# Patient Record
Sex: MALE | Race: WHITE | Employment: OTHER | URBAN - METROPOLITAN AREA
[De-identification: names, ages, dates, MRNs, and addresses within clinical notes are randomized per-mention and may not be internally consistent; named-entity substitution may affect disease eponyms.]

---

## 2021-05-20 RX ORDER — OXYCODONE AND ACETAMINOPHEN 10; 325 MG/1; MG/1
2 TABLET ORAL 2 TIMES DAILY PRN
Status: ON HOLD | COMMUNITY
End: 2021-05-25 | Stop reason: HOSPADM

## 2021-05-20 RX ORDER — TRAZODONE HYDROCHLORIDE 100 MG/1
100 TABLET ORAL NIGHTLY
COMMUNITY

## 2021-05-20 RX ORDER — CYCLOBENZAPRINE HCL 10 MG
10 TABLET ORAL DAILY
COMMUNITY
End: 2021-09-14

## 2021-05-20 RX ORDER — GABAPENTIN 600 MG/1
1800 TABLET, FILM COATED ORAL NIGHTLY
Status: ON HOLD | COMMUNITY
End: 2021-05-25 | Stop reason: SDUPTHER

## 2021-05-24 ENCOUNTER — HOSPITAL ENCOUNTER (INPATIENT)
Age: 61
LOS: 1 days | Discharge: HOME | DRG: 472 | End: 2021-05-25
Attending: ORTHOPAEDIC SURGERY | Admitting: ORTHOPAEDIC SURGERY
Payer: COMMERCIAL

## 2021-05-24 ENCOUNTER — ANESTHESIA (OUTPATIENT)
Dept: OPERATING ROOM | Age: 61
DRG: 472 | End: 2021-05-24
Payer: COMMERCIAL

## 2021-05-24 ENCOUNTER — APPOINTMENT (OUTPATIENT)
Dept: GENERAL RADIOLOGY | Age: 61
DRG: 472 | End: 2021-05-24
Attending: ORTHOPAEDIC SURGERY
Payer: COMMERCIAL

## 2021-05-24 ENCOUNTER — ANESTHESIA EVENT (OUTPATIENT)
Dept: OPERATING ROOM | Age: 61
DRG: 472 | End: 2021-05-24
Payer: COMMERCIAL

## 2021-05-24 VITALS — OXYGEN SATURATION: 98 % | TEMPERATURE: 97.5 F | DIASTOLIC BLOOD PRESSURE: 55 MMHG | SYSTOLIC BLOOD PRESSURE: 107 MMHG

## 2021-05-24 DIAGNOSIS — G89.29 CHRONIC NECK AND BACK PAIN: ICD-10-CM

## 2021-05-24 DIAGNOSIS — M54.2 CHRONIC NECK AND BACK PAIN: ICD-10-CM

## 2021-05-24 DIAGNOSIS — M47.12 CERVICAL SPONDYLOSIS WITH MYELOPATHY: Primary | ICD-10-CM

## 2021-05-24 DIAGNOSIS — M54.9 CHRONIC NECK AND BACK PAIN: ICD-10-CM

## 2021-05-24 PROCEDURE — 2720000010 HC SURG SUPPLY STERILE: Performed by: ORTHOPAEDIC SURGERY

## 2021-05-24 PROCEDURE — 1200000000 HC SEMI PRIVATE

## 2021-05-24 PROCEDURE — 0PP304Z REMOVAL OF INTERNAL FIXATION DEVICE FROM CERVICAL VERTEBRA, OPEN APPROACH: ICD-10-PCS | Performed by: ORTHOPAEDIC SURGERY

## 2021-05-24 PROCEDURE — 01N10ZZ RELEASE CERVICAL NERVE, OPEN APPROACH: ICD-10-PCS | Performed by: ORTHOPAEDIC SURGERY

## 2021-05-24 PROCEDURE — 0RB30ZZ EXCISION OF CERVICAL VERTEBRAL DISC, OPEN APPROACH: ICD-10-PCS | Performed by: ORTHOPAEDIC SURGERY

## 2021-05-24 PROCEDURE — 6360000002 HC RX W HCPCS: Performed by: NURSE ANESTHETIST, CERTIFIED REGISTERED

## 2021-05-24 PROCEDURE — 0RG20A0 FUSION OF 2 OR MORE CERVICAL VERTEBRAL JOINTS WITH INTERBODY FUSION DEVICE, ANTERIOR APPROACH, ANTERIOR COLUMN, OPEN APPROACH: ICD-10-PCS | Performed by: ORTHOPAEDIC SURGERY

## 2021-05-24 PROCEDURE — 3600000004 HC SURGERY LEVEL 4 BASE: Performed by: ORTHOPAEDIC SURGERY

## 2021-05-24 PROCEDURE — C1713 ANCHOR/SCREW BN/BN,TIS/BN: HCPCS | Performed by: ORTHOPAEDIC SURGERY

## 2021-05-24 PROCEDURE — 6360000002 HC RX W HCPCS: Performed by: ORTHOPAEDIC SURGERY

## 2021-05-24 PROCEDURE — 2580000003 HC RX 258: Performed by: ANESTHESIOLOGY

## 2021-05-24 PROCEDURE — 3700000001 HC ADD 15 MINUTES (ANESTHESIA): Performed by: ORTHOPAEDIC SURGERY

## 2021-05-24 PROCEDURE — 3209999900 FLUORO FOR SURGICAL PROCEDURES

## 2021-05-24 PROCEDURE — 6360000002 HC RX W HCPCS: Performed by: ANESTHESIOLOGY

## 2021-05-24 PROCEDURE — 3700000000 HC ANESTHESIA ATTENDED CARE: Performed by: ORTHOPAEDIC SURGERY

## 2021-05-24 PROCEDURE — 7100000000 HC PACU RECOVERY - FIRST 15 MIN: Performed by: ORTHOPAEDIC SURGERY

## 2021-05-24 PROCEDURE — 2780000010 HC IMPLANT OTHER: Performed by: ORTHOPAEDIC SURGERY

## 2021-05-24 PROCEDURE — 2580000003 HC RX 258: Performed by: NURSE ANESTHETIST, CERTIFIED REGISTERED

## 2021-05-24 PROCEDURE — 2500000003 HC RX 250 WO HCPCS: Performed by: NURSE ANESTHETIST, CERTIFIED REGISTERED

## 2021-05-24 PROCEDURE — 2580000003 HC RX 258: Performed by: ORTHOPAEDIC SURGERY

## 2021-05-24 PROCEDURE — 7100000001 HC PACU RECOVERY - ADDTL 15 MIN: Performed by: ORTHOPAEDIC SURGERY

## 2021-05-24 PROCEDURE — 2709999900 HC NON-CHARGEABLE SUPPLY: Performed by: ORTHOPAEDIC SURGERY

## 2021-05-24 PROCEDURE — 6370000000 HC RX 637 (ALT 250 FOR IP): Performed by: ORTHOPAEDIC SURGERY

## 2021-05-24 PROCEDURE — 2500000003 HC RX 250 WO HCPCS: Performed by: ORTHOPAEDIC SURGERY

## 2021-05-24 PROCEDURE — 3600000014 HC SURGERY LEVEL 4 ADDTL 15MIN: Performed by: ORTHOPAEDIC SURGERY

## 2021-05-24 DEVICE — SCREW 7713515 ZEVO VAR SD 3.5MM X 15MM
Type: IMPLANTABLE DEVICE | Site: SPINE CERVICAL | Status: FUNCTIONAL
Brand: ZEVO™ ANTERIOR CERVICAL PLATE SYSTEM

## 2021-05-24 DEVICE — IMPLANT 4240864 ANATOMIC C 16X14X 8MM
Type: IMPLANTABLE DEVICE | Site: SPINE CERVICAL | Status: FUNCTIONAL
Brand: ANATOMIC PEEK CERVICAL FUSION SYSTEM

## 2021-05-24 DEVICE — GRAFT BNE SUB 1CC CELLULAR MTRX OSTEOCEL +: Type: IMPLANTABLE DEVICE | Site: SPINE CERVICAL | Status: FUNCTIONAL

## 2021-05-24 RX ORDER — HYDRALAZINE HYDROCHLORIDE 20 MG/ML
5 INJECTION INTRAMUSCULAR; INTRAVENOUS EVERY 10 MIN PRN
Status: DISCONTINUED | OUTPATIENT
Start: 2021-05-24 | End: 2021-05-24 | Stop reason: HOSPADM

## 2021-05-24 RX ORDER — DEXAMETHASONE SODIUM PHOSPHATE 10 MG/ML
INJECTION, SOLUTION INTRAMUSCULAR; INTRAVENOUS PRN
Status: DISCONTINUED | OUTPATIENT
Start: 2021-05-24 | End: 2021-05-24 | Stop reason: SDUPTHER

## 2021-05-24 RX ORDER — SODIUM CHLORIDE 0.9 % (FLUSH) 0.9 %
10 SYRINGE (ML) INJECTION PRN
Status: DISCONTINUED | OUTPATIENT
Start: 2021-05-24 | End: 2021-05-25 | Stop reason: HOSPADM

## 2021-05-24 RX ORDER — KETAMINE HCL IN NACL, ISO-OSM 100MG/10ML
SYRINGE (ML) INJECTION PRN
Status: DISCONTINUED | OUTPATIENT
Start: 2021-05-24 | End: 2021-05-24 | Stop reason: SDUPTHER

## 2021-05-24 RX ORDER — CYCLOBENZAPRINE HCL 10 MG
10 TABLET ORAL 3 TIMES DAILY PRN
Status: DISCONTINUED | OUTPATIENT
Start: 2021-05-24 | End: 2021-05-25 | Stop reason: HOSPADM

## 2021-05-24 RX ORDER — ONDANSETRON 2 MG/ML
INJECTION INTRAMUSCULAR; INTRAVENOUS PRN
Status: DISCONTINUED | OUTPATIENT
Start: 2021-05-24 | End: 2021-05-24 | Stop reason: SDUPTHER

## 2021-05-24 RX ORDER — FENTANYL CITRATE 50 UG/ML
25 INJECTION, SOLUTION INTRAMUSCULAR; INTRAVENOUS EVERY 5 MIN PRN
Status: DISCONTINUED | OUTPATIENT
Start: 2021-05-24 | End: 2021-05-24 | Stop reason: HOSPADM

## 2021-05-24 RX ORDER — LABETALOL HYDROCHLORIDE 5 MG/ML
5 INJECTION, SOLUTION INTRAVENOUS EVERY 10 MIN PRN
Status: DISCONTINUED | OUTPATIENT
Start: 2021-05-24 | End: 2021-05-24 | Stop reason: HOSPADM

## 2021-05-24 RX ORDER — POLYETHYLENE GLYCOL 3350 17 G/17G
17 POWDER, FOR SOLUTION ORAL DAILY
Status: DISCONTINUED | OUTPATIENT
Start: 2021-05-24 | End: 2021-05-25 | Stop reason: HOSPADM

## 2021-05-24 RX ORDER — MORPHINE SULFATE 15 MG/1
15 TABLET, FILM COATED, EXTENDED RELEASE ORAL EVERY 12 HOURS SCHEDULED
Status: DISCONTINUED | OUTPATIENT
Start: 2021-05-24 | End: 2021-05-25 | Stop reason: HOSPADM

## 2021-05-24 RX ORDER — LIDOCAINE HYDROCHLORIDE 20 MG/ML
JELLY TOPICAL
Status: DISCONTINUED
Start: 2021-05-24 | End: 2021-05-24

## 2021-05-24 RX ORDER — SODIUM CHLORIDE 9 MG/ML
25 INJECTION, SOLUTION INTRAVENOUS PRN
Status: DISCONTINUED | OUTPATIENT
Start: 2021-05-24 | End: 2021-05-25 | Stop reason: HOSPADM

## 2021-05-24 RX ORDER — HYDROMORPHONE HYDROCHLORIDE 1 MG/ML
0.5 INJECTION, SOLUTION INTRAMUSCULAR; INTRAVENOUS; SUBCUTANEOUS EVERY 5 MIN PRN
Status: DISCONTINUED | OUTPATIENT
Start: 2021-05-24 | End: 2021-05-24 | Stop reason: HOSPADM

## 2021-05-24 RX ORDER — LIDOCAINE HYDROCHLORIDE 20 MG/ML
INJECTION, SOLUTION EPIDURAL; INFILTRATION; INTRACAUDAL; PERINEURAL PRN
Status: DISCONTINUED | OUTPATIENT
Start: 2021-05-24 | End: 2021-05-24 | Stop reason: SDUPTHER

## 2021-05-24 RX ORDER — ONDANSETRON 2 MG/ML
4 INJECTION INTRAMUSCULAR; INTRAVENOUS
Status: DISCONTINUED | OUTPATIENT
Start: 2021-05-24 | End: 2021-05-24 | Stop reason: HOSPADM

## 2021-05-24 RX ORDER — SENNA AND DOCUSATE SODIUM 50; 8.6 MG/1; MG/1
1 TABLET, FILM COATED ORAL 2 TIMES DAILY
Status: DISCONTINUED | OUTPATIENT
Start: 2021-05-24 | End: 2021-05-25 | Stop reason: HOSPADM

## 2021-05-24 RX ORDER — OXYCODONE AND ACETAMINOPHEN 10; 325 MG/1; MG/1
1 TABLET ORAL EVERY 4 HOURS PRN
Status: DISCONTINUED | OUTPATIENT
Start: 2021-05-24 | End: 2021-05-25 | Stop reason: HOSPADM

## 2021-05-24 RX ORDER — PROPOFOL 10 MG/ML
INJECTION, EMULSION INTRAVENOUS PRN
Status: DISCONTINUED | OUTPATIENT
Start: 2021-05-24 | End: 2021-05-24 | Stop reason: SDUPTHER

## 2021-05-24 RX ORDER — SODIUM CHLORIDE, SODIUM LACTATE, POTASSIUM CHLORIDE, CALCIUM CHLORIDE 600; 310; 30; 20 MG/100ML; MG/100ML; MG/100ML; MG/100ML
INJECTION, SOLUTION INTRAVENOUS CONTINUOUS
Status: DISCONTINUED | OUTPATIENT
Start: 2021-05-24 | End: 2021-05-24

## 2021-05-24 RX ORDER — DEXAMETHASONE SODIUM PHOSPHATE 10 MG/ML
6 INJECTION, SOLUTION INTRAMUSCULAR; INTRAVENOUS EVERY 8 HOURS
Status: COMPLETED | OUTPATIENT
Start: 2021-05-24 | End: 2021-05-25

## 2021-05-24 RX ORDER — PROMETHAZINE HYDROCHLORIDE 25 MG/ML
6.25 INJECTION, SOLUTION INTRAMUSCULAR; INTRAVENOUS
Status: DISCONTINUED | OUTPATIENT
Start: 2021-05-24 | End: 2021-05-24 | Stop reason: HOSPADM

## 2021-05-24 RX ORDER — PROPOFOL 10 MG/ML
INJECTION, EMULSION INTRAVENOUS CONTINUOUS PRN
Status: DISCONTINUED | OUTPATIENT
Start: 2021-05-24 | End: 2021-05-24 | Stop reason: SDUPTHER

## 2021-05-24 RX ORDER — OXYCODONE AND ACETAMINOPHEN 10; 325 MG/1; MG/1
2 TABLET ORAL EVERY 4 HOURS PRN
Status: DISCONTINUED | OUTPATIENT
Start: 2021-05-24 | End: 2021-05-25 | Stop reason: HOSPADM

## 2021-05-24 RX ORDER — TRAZODONE HYDROCHLORIDE 100 MG/1
100 TABLET ORAL NIGHTLY
Status: DISCONTINUED | OUTPATIENT
Start: 2021-05-24 | End: 2021-05-25 | Stop reason: HOSPADM

## 2021-05-24 RX ORDER — SUCCINYLCHOLINE/SOD CL,ISO/PF 100 MG/5ML
SYRINGE (ML) INTRAVENOUS PRN
Status: DISCONTINUED | OUTPATIENT
Start: 2021-05-24 | End: 2021-05-24 | Stop reason: SDUPTHER

## 2021-05-24 RX ORDER — PHENYLEPHRINE HCL IN 0.9% NACL 1 MG/10 ML
SYRINGE (ML) INTRAVENOUS PRN
Status: DISCONTINUED | OUTPATIENT
Start: 2021-05-24 | End: 2021-05-24 | Stop reason: SDUPTHER

## 2021-05-24 RX ORDER — PROPOFOL 10 MG/ML
INJECTION, EMULSION INTRAVENOUS
Status: COMPLETED
Start: 2021-05-24 | End: 2021-05-24

## 2021-05-24 RX ORDER — MIDAZOLAM HYDROCHLORIDE 1 MG/ML
INJECTION INTRAMUSCULAR; INTRAVENOUS PRN
Status: DISCONTINUED | OUTPATIENT
Start: 2021-05-24 | End: 2021-05-24 | Stop reason: SDUPTHER

## 2021-05-24 RX ORDER — ONDANSETRON 2 MG/ML
4 INJECTION INTRAMUSCULAR; INTRAVENOUS EVERY 6 HOURS PRN
Status: DISCONTINUED | OUTPATIENT
Start: 2021-05-24 | End: 2021-05-25 | Stop reason: HOSPADM

## 2021-05-24 RX ORDER — OXYCODONE HYDROCHLORIDE AND ACETAMINOPHEN 5; 325 MG/1; MG/1
1 TABLET ORAL PRN
Status: DISCONTINUED | OUTPATIENT
Start: 2021-05-24 | End: 2021-05-24 | Stop reason: HOSPADM

## 2021-05-24 RX ORDER — FENTANYL CITRATE 50 UG/ML
INJECTION, SOLUTION INTRAMUSCULAR; INTRAVENOUS PRN
Status: DISCONTINUED | OUTPATIENT
Start: 2021-05-24 | End: 2021-05-24 | Stop reason: SDUPTHER

## 2021-05-24 RX ORDER — PROMETHAZINE HYDROCHLORIDE 12.5 MG/1
12.5 TABLET ORAL EVERY 6 HOURS PRN
Status: DISCONTINUED | OUTPATIENT
Start: 2021-05-24 | End: 2021-05-25 | Stop reason: HOSPADM

## 2021-05-24 RX ORDER — SODIUM CHLORIDE 0.9 % (FLUSH) 0.9 %
10 SYRINGE (ML) INJECTION EVERY 12 HOURS SCHEDULED
Status: DISCONTINUED | OUTPATIENT
Start: 2021-05-24 | End: 2021-05-25 | Stop reason: HOSPADM

## 2021-05-24 RX ORDER — OXYCODONE HYDROCHLORIDE AND ACETAMINOPHEN 5; 325 MG/1; MG/1
2 TABLET ORAL PRN
Status: DISCONTINUED | OUTPATIENT
Start: 2021-05-24 | End: 2021-05-24 | Stop reason: HOSPADM

## 2021-05-24 RX ORDER — SODIUM CHLORIDE 9 MG/ML
INJECTION, SOLUTION INTRAVENOUS CONTINUOUS
Status: DISCONTINUED | OUTPATIENT
Start: 2021-05-24 | End: 2021-05-25 | Stop reason: HOSPADM

## 2021-05-24 RX ADMIN — ONDANSETRON 4 MG: 2 INJECTION, SOLUTION INTRAMUSCULAR; INTRAVENOUS at 15:46

## 2021-05-24 RX ADMIN — TRAZODONE HYDROCHLORIDE 100 MG: 100 TABLET ORAL at 21:42

## 2021-05-24 RX ADMIN — DOCUSATE SODIUM 50MG AND SENNOSIDES 8.6MG 1 TABLET: 8.6; 5 TABLET, FILM COATED ORAL at 21:42

## 2021-05-24 RX ADMIN — FENTANYL CITRATE 50 MCG: 50 INJECTION, SOLUTION INTRAMUSCULAR; INTRAVENOUS at 13:27

## 2021-05-24 RX ADMIN — PROPOFOL 150 MCG/KG/MIN: 10 INJECTION, EMULSION INTRAVENOUS at 14:07

## 2021-05-24 RX ADMIN — OXYCODONE HYDROCHLORIDE AND ACETAMINOPHEN 2 TABLET: 10; 325 TABLET ORAL at 22:56

## 2021-05-24 RX ADMIN — MORPHINE SULFATE 15 MG: 15 TABLET, FILM COATED, EXTENDED RELEASE ORAL at 21:42

## 2021-05-24 RX ADMIN — HYDROMORPHONE HYDROCHLORIDE 0.5 MG: 1 INJECTION, SOLUTION INTRAMUSCULAR; INTRAVENOUS; SUBCUTANEOUS at 17:28

## 2021-05-24 RX ADMIN — PROPOFOL 120 MCG/KG/MIN: 10 INJECTION, EMULSION INTRAVENOUS at 13:45

## 2021-05-24 RX ADMIN — Medication 200 MCG: at 13:48

## 2021-05-24 RX ADMIN — CEFAZOLIN 2000 MG: 10 INJECTION, POWDER, FOR SOLUTION INTRAVENOUS at 13:35

## 2021-05-24 RX ADMIN — PHENYLEPHRINE HYDROCHLORIDE 50 MCG/MIN: 10 INJECTION, SOLUTION INTRAMUSCULAR; INTRAVENOUS; SUBCUTANEOUS at 14:22

## 2021-05-24 RX ADMIN — SODIUM CHLORIDE, POTASSIUM CHLORIDE, SODIUM LACTATE AND CALCIUM CHLORIDE: 600; 310; 30; 20 INJECTION, SOLUTION INTRAVENOUS at 11:57

## 2021-05-24 RX ADMIN — LIDOCAINE HYDROCHLORIDE 50 MG: 20 INJECTION, SOLUTION EPIDURAL; INFILTRATION; INTRACAUDAL; PERINEURAL at 13:28

## 2021-05-24 RX ADMIN — PROPOFOL 30 MG: 10 INJECTION, EMULSION INTRAVENOUS at 13:45

## 2021-05-24 RX ADMIN — Medication 200 MCG: at 14:06

## 2021-05-24 RX ADMIN — Medication 10 MG: at 15:15

## 2021-05-24 RX ADMIN — DEXAMETHASONE SODIUM PHOSPHATE 10 MG: 10 INJECTION INTRAMUSCULAR; INTRAVENOUS at 13:54

## 2021-05-24 RX ADMIN — HYDROMORPHONE HYDROCHLORIDE 0.5 MG: 1 INJECTION, SOLUTION INTRAMUSCULAR; INTRAVENOUS; SUBCUTANEOUS at 17:13

## 2021-05-24 RX ADMIN — FENTANYL CITRATE 25 MCG: 50 INJECTION, SOLUTION INTRAMUSCULAR; INTRAVENOUS at 16:00

## 2021-05-24 RX ADMIN — SODIUM CHLORIDE, POTASSIUM CHLORIDE, SODIUM LACTATE AND CALCIUM CHLORIDE: 600; 310; 30; 20 INJECTION, SOLUTION INTRAVENOUS at 16:26

## 2021-05-24 RX ADMIN — MIDAZOLAM 2 MG: 1 INJECTION INTRAMUSCULAR; INTRAVENOUS at 13:20

## 2021-05-24 RX ADMIN — OXYCODONE HYDROCHLORIDE AND ACETAMINOPHEN 2 TABLET: 10; 325 TABLET ORAL at 18:29

## 2021-05-24 RX ADMIN — PROPOFOL 110 MG: 10 INJECTION, EMULSION INTRAVENOUS at 13:28

## 2021-05-24 RX ADMIN — Medication 100 MG: at 13:28

## 2021-05-24 RX ADMIN — FENTANYL CITRATE 50 MCG: 50 INJECTION, SOLUTION INTRAMUSCULAR; INTRAVENOUS at 14:03

## 2021-05-24 RX ADMIN — HYDROMORPHONE HYDROCHLORIDE 0.5 MG: 1 INJECTION, SOLUTION INTRAMUSCULAR; INTRAVENOUS; SUBCUTANEOUS at 20:36

## 2021-05-24 RX ADMIN — CEFAZOLIN SODIUM 2000 MG: 10 INJECTION, POWDER, FOR SOLUTION INTRAVENOUS at 18:44

## 2021-05-24 RX ADMIN — Medication 25 MG: at 13:57

## 2021-05-24 RX ADMIN — FENTANYL CITRATE 25 MCG: 50 INJECTION, SOLUTION INTRAMUSCULAR; INTRAVENOUS at 15:10

## 2021-05-24 RX ADMIN — LIDOCAINE HYDROCHLORIDE 50 MG: 20 INJECTION, SOLUTION EPIDURAL; INFILTRATION; INTRACAUDAL; PERINEURAL at 16:00

## 2021-05-24 RX ADMIN — POLYETHYLENE GLYCOL 3350 17 G: 17 POWDER, FOR SOLUTION ORAL at 21:43

## 2021-05-24 RX ADMIN — SODIUM CHLORIDE: 9 INJECTION, SOLUTION INTRAVENOUS at 18:44

## 2021-05-24 RX ADMIN — DEXAMETHASONE SODIUM PHOSPHATE 6 MG: 10 INJECTION, SOLUTION INTRAMUSCULAR; INTRAVENOUS at 18:44

## 2021-05-24 ASSESSMENT — PULMONARY FUNCTION TESTS
PIF_VALUE: 14
PIF_VALUE: 16
PIF_VALUE: 15
PIF_VALUE: 15
PIF_VALUE: 1
PIF_VALUE: 14
PIF_VALUE: 15
PIF_VALUE: 16
PIF_VALUE: 15
PIF_VALUE: 16
PIF_VALUE: 16
PIF_VALUE: 15
PIF_VALUE: 16
PIF_VALUE: 15
PIF_VALUE: 15
PIF_VALUE: 31
PIF_VALUE: 17
PIF_VALUE: 15
PIF_VALUE: 16
PIF_VALUE: 16
PIF_VALUE: 15
PIF_VALUE: 14
PIF_VALUE: 15
PIF_VALUE: 16
PIF_VALUE: 14
PIF_VALUE: 16
PIF_VALUE: 14
PIF_VALUE: 16
PIF_VALUE: 3
PIF_VALUE: 15
PIF_VALUE: 1
PIF_VALUE: 15
PIF_VALUE: 15
PIF_VALUE: 16
PIF_VALUE: 16
PIF_VALUE: 14
PIF_VALUE: 14
PIF_VALUE: 15
PIF_VALUE: 15
PIF_VALUE: 14
PIF_VALUE: 15
PIF_VALUE: 31
PIF_VALUE: 14
PIF_VALUE: 16
PIF_VALUE: 16
PIF_VALUE: 15
PIF_VALUE: 16
PIF_VALUE: 18
PIF_VALUE: 15
PIF_VALUE: 16
PIF_VALUE: 0
PIF_VALUE: 12
PIF_VALUE: 1
PIF_VALUE: 16
PIF_VALUE: 15
PIF_VALUE: 14
PIF_VALUE: 15
PIF_VALUE: 1
PIF_VALUE: 15
PIF_VALUE: 15
PIF_VALUE: 17
PIF_VALUE: 15
PIF_VALUE: 14
PIF_VALUE: 15
PIF_VALUE: 15
PIF_VALUE: 16
PIF_VALUE: 14
PIF_VALUE: 14
PIF_VALUE: 15
PIF_VALUE: 16
PIF_VALUE: 14
PIF_VALUE: 15
PIF_VALUE: 16
PIF_VALUE: 15
PIF_VALUE: 16
PIF_VALUE: 14
PIF_VALUE: 15
PIF_VALUE: 2
PIF_VALUE: 14
PIF_VALUE: 16
PIF_VALUE: 15
PIF_VALUE: 16
PIF_VALUE: 15
PIF_VALUE: 16
PIF_VALUE: 1
PIF_VALUE: 15
PIF_VALUE: 16

## 2021-05-24 ASSESSMENT — PAIN DESCRIPTION - PROGRESSION: CLINICAL_PROGRESSION: NOT CHANGED

## 2021-05-24 ASSESSMENT — PAIN SCALES - GENERAL
PAINLEVEL_OUTOF10: 6
PAINLEVEL_OUTOF10: 6
PAINLEVEL_OUTOF10: 8
PAINLEVEL_OUTOF10: 8
PAINLEVEL_OUTOF10: 6

## 2021-05-24 ASSESSMENT — PAIN DESCRIPTION - FREQUENCY
FREQUENCY: CONTINUOUS
FREQUENCY: CONTINUOUS

## 2021-05-24 ASSESSMENT — PAIN DESCRIPTION - DIRECTION: RADIATING_TOWARDS: BILATERAL HANDS AND LEGS

## 2021-05-24 ASSESSMENT — PAIN DESCRIPTION - PAIN TYPE
TYPE: SURGICAL PAIN

## 2021-05-24 ASSESSMENT — PAIN DESCRIPTION - ONSET: ONSET: ON-GOING

## 2021-05-24 ASSESSMENT — PAIN DESCRIPTION - LOCATION
LOCATION: NECK

## 2021-05-24 ASSESSMENT — PAIN DESCRIPTION - ORIENTATION: ORIENTATION: RIGHT;LEFT;ANTERIOR

## 2021-05-24 ASSESSMENT — PAIN DESCRIPTION - DESCRIPTORS: DESCRIPTORS: CONSTANT;DISCOMFORT;SORE

## 2021-05-24 NOTE — BRIEF OP NOTE
Brief Postoperative Note      Patient: Koki Frost  YOB: 1960  MRN: [id number]    Date of Procedure: 5/24/2021    Pre-Op Diagnosis: DX CERVICAL SPONDYLOSIS WITH MYELOPATHY    Post-Op Diagnosis: Same       Procedure(s):  C 4-6 ACDF        HARDWARE REMOVAL        C6-7 PLATE - [employer]   NEURO MONITORING    Surgeon(s):  Cam Cortes MD    Assistant:  First Assistant: Harish Felix    Anesthesia: General    Estimated Blood Loss (mL): less than 847     Complications: None    Specimens:   * No specimens in log *    Implants:  Implant Name Type Inv. Item Serial No.  Lot No. LRB No. Used Action   GRAFT BNE SUB 1CC CELLULAR MTRX OSTEOCEL + - [id number]  GRAFT BNE SUB 1CC CELLULAR MTRX OSTEOCEL + [id number] [employer]WD  N/A 1 Implanted   CAGE SPNL [id number] D14MM CERV INTBDY FUS PEEK OPTMA  CAGE SPNL [id number] D14MM CERV INTBDY FUS PEEK OPTMA  [employer] [place] [employer]WD [id number] N/A 1 Implanted   CAGE SPNL [id number] D14MM CERV INTBDY FUS PEEK OPTMA  CAGE SPNL [id number] D14MM CERV INTBDY FUS PEEK OPTMA  [employer] [place] [employer]-WD [id number] N/A 1 Implanted   PLATE SPNL [id number] THK1. 9MM 8 H LEV 3 ANT CERV TI ALLY BILAT  PLATE SPNL [id number] THK1. 9MM 8 H LEV 3 ANT CERV TI ALLY BILAT  [employer] SOFAMOR DANEK-WD  N/A 1 Implanted   SCREW SPNL L15MM DIA3. 5MM ANT CERV TI SELF DRL MEMO ANG  SCREW SPNL L15MM DIA3. 5MM ANT CERV TI SELF DRL MEMO ANG  [employer] SOFAMOR DANEK-WD  N/A 8 Implanted         Drains:   Closed/Suction Drain Left Neck Bulb 7 [place] (Active)       Urethral Catheter Double-lumen;Non-latex;Straight-tip 16 fr (Active)         Electronically signed by Dani Howell MD on 5/24/2021 at 4:11 PM

## 2021-05-24 NOTE — OP NOTE
Operative Note      Patient: Elijah Caro  YOB: 1960  MRN: [id number]    Date of Procedure: 5/24/2021    SURGEON:  Jacquie Freedman M.D. ANESTHESIA:  General endotracheal anesthesia. PREOPERATIVE DIAGNOSIS:  C4-6 cervical spondylosis with myelopathy. Retained HWR C6-7     POSTOPERATIVE DIAGNOSIS:  C4-6 cervical spondylotic with myelopathy. Retained HWR C6-7     PROCEDURE:  1. C4-5 anterior cervical diskectomy and fusion. 2. C5-6 anterior cervical diskectomy and fusion. 3. Insertion of interbody cages at C4-5 and C5-6  4. Insertion of anterior cervical plate and screws from C4-7,      which is a 3-level instrumentation utilizing Cisco Zevo plate and screws. 5.  Deep spinal hardware removal C6-7  6. [place] of local bone for spinal fusion. 7. Use of allograft bone for spinal fusion to include Osteocel. 8. Intraoperative use of C-arm fluoroscopy. ESTIMATED BLOOD LOSS:  50 mL. FLUIDS:  Per anesthesia record. COMPLICATIONS:  None. INDICATIONS:  This is a pleasant 57year-old male with a  longstanding history of significant neck pain as well as pain  radiating to the bilateral shoulders and arms with the right side being  worse than the left. He was also having balance issues  consistent with myelopathy and weakness in his arms. He had done extensive conservative management to include physical therapy, medications, pain  management, all with minimal long-standing benefit. MRI and x-  rays were performed, which did show severe disk degeneration at  C4-6 as well as significant central stenosis with myelomalacia at both the C4-5  and C5-6 levels and retained hardware at C6-7. Due to the continued pain and symptoms, it was discussed with him the option of performing a two-level  decompression fusion at C4-6 with hardware removal at C6-7 and plating from C4-7.   Risks were discussed including  bleeding, infection, injury to nerves, vessels, anesthetic risk,  need for possible further future surgery as well as the  possibility for continued pain, continued symptoms, and possible  nonunion. He did understand all these risks, did wish to  proceed. Informed consent was obtained. DESCRIPTION OF PROCEDURE:  The patient was taken to the operating  room, placed supine on the operating table. He was intubated,  placed under general anesthesia by the anesthesiologist.  He was  given preoperative antibiotic prophylaxis. Shoulder roll was placed, arms were tucked at the sides,and shoulders were taped down. Neuromonitoring was done by Evokes which included TcMEP, SSEP, and EMG. The neck was prepped and draped in a sterile fashion. At this point, a 3 cm incision was made over the left side of his neck at the C5 region. Dissection was carried down to the subcutaneous tissue and  through the platysma. Platysma was then undermined with  Metzenbaum scissors. Medial border of sternocleidomastoid was  visualized and the deep cervical fascia was bluntly opened. At  this point, blunt finger dissection was then used to proceed  medial to the carotid sheath and lateral to the esophagus and  trachea down to the anterior cervical spine. The handheld  Cloward was used to expose the prevertebral fascia. Two Kittners  were used to dissect the prevertebral fascia bluntly and expose  the anterior longitudinal ligament. The previous plate at C8-9 was exposed and removed with the screwdriver. Once this was done, a needle  was then inserted at the presumed C5-C6 level and C-arm was  brought in and confirmed it to be at the C5-C6 level. At this  point, the longus colli was dissected free bilaterally from C4-6. The disk space was marked with the Bovie and the needle was  removed. The Trimline retractor was inserted at the C5-6 level  and exposed the C5-6 interspace. Anterior osteophytes were  removed and this bone was harvested for later use. The disk  space was then incised with a scalpel. Pituitary was used to  partially remove the disk material followed by placement of  distractor pins at C5-6 and distraction was performed at this  level. At this point, pituitary and curettes were used to remove  the remaining disk material back to the posterior longitudinal  ligament as well as the cartilaginous endplates. The posterior  osteophytes were then burred with the bur as well as the  uncinates and the curettes were then used to remove posterior  osteophytes as well as a #1 [place] to perform bilateral  foraminotomies until there was full decompression at the C5-6  level. Once this was done, sizing was performed and showed that  a 9 mm spacer cage would be appropriate. This was then packed  with the [facility] allograft bone as well as the local bone, which  was previously harvested and it was then [facility] [employer] [facility] into the C5-6  disk space until it was fully seated.  was then removed. The distraction pins were removed and the retractor was then  moved up to the C4-5 level in the exact same fashion. Full  decompression and disc removal as well as cartilaginous endplates  were removed as well. Posterior decompression was complete as  well as foraminotomies until it was fully decompressed. A size 8  spacer was chosen and packed with the same bone graft, which was  the [facility] graft and local bone, and was malleted into the  position until it was fully seated at the C4-5 level. Once both  cages were in good position and distractor pins were removed,  head was flexed up slightly to allow compression at the C4-6  levels. The anterior plate was chosen, 61 mm plate was placed  anterior along the cervical spine from C4-7. The starting holes were then  created with a drill and a total of 8 screws were placed for securing the plate to the anterior cervical spine. All locking mechanisms were then engaged. Wound  was then irrigated with sterile normal saline followed by removal  of the Trimline retractor.   C-arm was then brought in and final  pictures were taken, which showed all instrumentation from C4-7  to be in excellent position on both AP and lateral views. Wound  was then reinspected with a hand-held Cloward. Hemostasis was  achieved with Gelfoam, thrombin as well as full FloSeal.  Wound  was once again irrigated with sterile normal saline with  bacitracin. JULIANA drain was placed followed by closure with a 3-0  Vicryl and a running 4-0 Monocryl suture with Dermabond glue. Standard dressings were then applied followed by placement of a  cervical collar. He was then awoken from anesthesia, extubated,   and taken to recovery room in stable condition.     Electronically signed by Mario Rasmussen MD on 5/24/2021 at 7:34 PM

## 2021-05-24 NOTE — ANESTHESIA PRE PROCEDURE
dexamethasone (PF) (DECADRON) injection   Intravenous PRN Danella Park, APRN - CRNA   10 mg at 05/24/21 1354    ketamine (KETALAR) injection   Intravenous PRN Danella Park, APRN - CRNA   25 mg at 05/24/21 1357    propofol injection   Intravenous PRN Danella Park, APRN - CRNA   30 mg at 05/24/21 1345    succinylcholine chloride (ANECTINE) injection   Intravenous PRN Danella Park, APRN - CRNA   100 mg at 05/24/21 1328    lidocaine PF 2 % injection   Intravenous PRN Danella Park, APRN - CRNA   50 mg at 05/24/21 1328    fentaNYL (SUBLIMAZE) injection   Intravenous PRN Danella Park, APRN - CRNA   50 mcg at 05/24/21 1403    phenylephrine (LEX-SYNEPHRINE) 1 MG/10ML prefilled syringe   Intravenous PRN Danella Park, APRN - CRNA   200 mcg at 05/24/21 1406    propofol injection   Intravenous Continuous PRN Danella Park, APRN - CRNA 61.2 mL/hr at 05/24/21 1427 150 mcg/kg/min at 05/24/21 1427    phenylephrine (LEX-SYNEPHRINE) 50 mg in dextrose 5 % 250 mL infusion   Intravenous Continuous PRN Danella Park, APRN - CRNA 11.25 mL/hr at 05/24/21 1447 37.5 mcg/min at 05/24/21 1447       Allergies:  No Known Allergies    Problem List:  There is no problem list on file for this patient.       Past Medical History:        Diagnosis Date    Arthralgia     Cervical myelopathy ([address]     DDD (degenerative disc disease), lumbar     Lumbar disc herniation     Retrolisthesis     Spondylolisthesis of lumbar region        Past Surgical History:        Procedure Laterality Date    BACK SURGERY  06/01/2018    CHOLECYSTECTOMY      NECK SURGERY      cage and titanium disc       Social History:    Social History     Tobacco Use    Smoking status: Current Every Day Smoker     Packs/day: 1.00     Years: 35.00     Pack years: 35.00     Types: Cigarettes    Smokeless tobacco: Never Used   Substance Use Topics    Alcohol use: Yes     Comment: social Ready to quit: Not Answered  Counseling given: Not Answered      Vital Signs (Current):   Vitals:    05/24/21 1123   BP: 119/75   Pulse: 94   Resp: 16   Temp: 97.5 °F (36.4 °C)   TempSrc: Temporal   SpO2: 97%   Weight: 150 lb (68 kg)   Height: 5' 8\" (1.727 m)                                              BP Readings from Last 3 Encounters:   05/24/21 119/75   05/24/21 122/67       NPO Status: Time of last liquid consumption: 2030                        Time of last solid consumption: 2030                        Date of last liquid consumption: 05/23/21                        Date of last solid food consumption: 05/23/21    BMI:   Wt Readings from Last 3 Encounters:   05/24/21 150 lb (68 kg)     Body mass index is 22.81 kg/m². CBC: No results found for: WBC, RBC, HGB, HCT, MCV, RDW, PLT    CMP: No results found for: NA, K, CL, CO2, BUN, CREATININE, GFRAA, AGRATIO, LABGLOM, GLUCOSE, PROT, CALCIUM, BILITOT, ALKPHOS, AST, ALT    POC Tests: No results for input(s): POCGLU, POCNA, POCK, POCCL, POCBUN, POCHEMO, POCHCT in the last 72 hours.     Coags: No results found for: PROTIME, INR, APTT    HCG (If Applicable): No results found for: PREGTESTUR, PREGSERUM, HCG, HCGQUANT     ABGs: No results found for: PHART, PO2ART, JUH9RJC, ZSA4IHT, BEART, T1ZQHPCX     Type & Screen (If Applicable):  No results found for: LABABO, LABRH    Drug/Infectious Status (If Applicable):  No results found for: HIV, HEPCAB    COVID-19 Screening (If Applicable): No results found for: COVID19        Anesthesia Evaluation  Patient summary reviewed and Nursing notes reviewed no history of anesthetic complications:   Airway: Mallampati: II  TM distance: >3 FB   Neck ROM: limited  Mouth opening: > = 3 FB Dental: normal exam         Pulmonary:normal exam        (-) COPD and asthma                           Cardiovascular:  Exercise tolerance: no interval change,       (-) past MI and CAD        Rate: normal                    Neuro/Psych:   (+) neuromuscular disease:,    (-) TIA and CVA           GI/Hepatic/Renal:        (-) GERD       Endo/Other:    (+) : arthritis:., .    (-) diabetes mellitus               Abdominal:           Vascular:                                        Anesthesia Plan      general     ASA 3       Induction: intravenous. Anesthetic plan and risks discussed with patient. Plan discussed with CRNA.     Attending anesthesiologist reviewed and agrees with Pre Eval content              Romana Muir DO   5/24/2021

## 2021-05-24 NOTE — H&P
Out of Food in the Last Year:    [address] in the Last Year:    Transportation Needs:     Lack of Transportation (Medical):  Lack of Transportation (Non-Medical):    Physical Activity:     Days of Exercise per Week:     Minutes of Exercise per Session:    Stress:     Feeling of Stress :    Social Connections:     Frequency of Communication with Friends and Family:     Frequency of Social Gatherings with Friends and Family:     Attends Mosque Services:     Active Member of Clubs or Organizations:     Attends Club or Organization Meetings:     Marital Status:    Intimate Partner Violence:     Fear of Current or Ex-Partner:     Emotionally Abused:     Physically Abused:     Sexually Abused:        Family History:     History reviewed. No pertinent family history. Medication History:     No current facility-administered medications on file prior to encounter. Current Outpatient Medications on File Prior to Encounter   Medication Sig Dispense Refill    traZODone (DESYREL) 100 MG tablet Take 100 mg by mouth nightly      oxyCODONE-acetaminophen (PERCOCET)  MG per tablet Take 2 tablets by mouth 2 times daily as needed for Pain.  Gabapentin, Once-Daily, (GRALISE) 600 MG TABS Take 1,800 mg by mouth nightly.  cyclobenzaprine (FLEXERIL) 10 MG tablet Take 10 mg by mouth daily         Vital signs: /75   Pulse 94   Temp 97.5 °F (36.4 °C) (Temporal)   Resp 16   SpO2 97%     Allergies:  Patient has no known allergies. This is a 57 y.o. male who is pleasant, cooperative, alert and oriented x3, in no acute distress. Heart: Heart sounds are normal.  HR 94 regular rate and rhythm without murmur, gallop or rub.    Lungs: Normal respiratory effort with equal expansion, good air exchange, unlabored and clear to auscultation without wheezes or rales bilaterally   Abdomen: soft, nontender, nondistended with bowel sounds   Neuro: cranial nerves II-XII grossly intact Hand grasps right 4/5  Left 5/5       Labs:  No results for input(s): HGB, HCT, WBC, MCV, PLT, NA, K, CL, CO2, BUN, CREATININE, GLUCOSE, INR, PROTIME, APTT, AST, ALT, LABALBU, HCG in the last 720 hours. No results for input(s): COVID19 in the last 720 hours.     Trudy Francis, APRN - CNP  APRN, ANP-BC  Electronically signed 5/24/2021 at 11:57 AM

## 2021-05-25 VITALS
OXYGEN SATURATION: 92 % | TEMPERATURE: 97.7 F | HEIGHT: 68 IN | HEART RATE: 82 BPM | RESPIRATION RATE: 16 BRPM | DIASTOLIC BLOOD PRESSURE: 79 MMHG | WEIGHT: 150 LBS | BODY MASS INDEX: 22.73 KG/M2 | SYSTOLIC BLOOD PRESSURE: 143 MMHG

## 2021-05-25 LAB
ANION GAP SERPL CALCULATED.3IONS-SCNC: 11 MMOL/L (ref 9–17)
BUN BLDV-MCNC: 14 MG/DL (ref 8–23)
BUN/CREAT BLD: 21 (ref 9–20)
CALCIUM SERPL-MCNC: 8.4 MG/DL (ref 8.6–10.4)
CHLORIDE BLD-SCNC: 104 MMOL/L (ref 98–107)
CO2: 25 MMOL/L (ref 20–31)
CREAT SERPL-MCNC: 0.67 MG/DL (ref 0.7–1.2)
GFR AFRICAN AMERICAN: >60 ML/MIN
GFR NON-AFRICAN AMERICAN: >60 ML/MIN
GFR SERPL CREATININE-BSD FRML MDRD: ABNORMAL ML/MIN/{1.73_M2}
GFR SERPL CREATININE-BSD FRML MDRD: ABNORMAL ML/MIN/{1.73_M2}
GLUCOSE BLD-MCNC: 149 MG/DL (ref 70–99)
HCT VFR BLD CALC: 40.6 % (ref 40.7–50.3)
HEMOGLOBIN: 13.1 G/DL (ref 13–17)
MCH RBC QN AUTO: 30.9 PG (ref 25.2–33.5)
MCHC RBC AUTO-ENTMCNC: 32.3 G/DL (ref 28.4–34.8)
MCV RBC AUTO: 95.8 FL (ref 82.6–102.9)
NRBC AUTOMATED: 0.2 PER 100 WBC
PDW BLD-RTO: 13.6 % (ref 11.8–14.4)
PLATELET # BLD: 293 K/UL (ref 138–453)
PMV BLD AUTO: 9.7 FL (ref 8.1–13.5)
POTASSIUM SERPL-SCNC: 4.4 MMOL/L (ref 3.7–5.3)
RBC # BLD: 4.24 M/UL (ref 4.21–5.77)
SODIUM BLD-SCNC: 140 MMOL/L (ref 135–144)
WBC # BLD: 12.6 K/UL (ref 3.5–11.3)

## 2021-05-25 PROCEDURE — 97116 GAIT TRAINING THERAPY: CPT

## 2021-05-25 PROCEDURE — 97530 THERAPEUTIC ACTIVITIES: CPT

## 2021-05-25 PROCEDURE — 6360000002 HC RX W HCPCS: Performed by: ORTHOPAEDIC SURGERY

## 2021-05-25 PROCEDURE — 2580000003 HC RX 258: Performed by: ORTHOPAEDIC SURGERY

## 2021-05-25 PROCEDURE — 6370000000 HC RX 637 (ALT 250 FOR IP): Performed by: ORTHOPAEDIC SURGERY

## 2021-05-25 PROCEDURE — 85027 COMPLETE CBC AUTOMATED: CPT

## 2021-05-25 PROCEDURE — 36415 COLL VENOUS BLD VENIPUNCTURE: CPT

## 2021-05-25 PROCEDURE — 97162 PT EVAL MOD COMPLEX 30 MIN: CPT

## 2021-05-25 PROCEDURE — 80048 BASIC METABOLIC PNL TOTAL CA: CPT

## 2021-05-25 RX ORDER — GABAPENTIN 600 MG/1
1800 TABLET, FILM COATED ORAL NIGHTLY
Qty: 90 TABLET | Refills: 0 | Status: SHIPPED | OUTPATIENT
Start: 2021-05-25 | End: 2021-09-14

## 2021-05-25 RX ORDER — MORPHINE SULFATE 15 MG/1
15 TABLET, FILM COATED, EXTENDED RELEASE ORAL EVERY 12 HOURS SCHEDULED
Qty: 6 TABLET | Refills: 0 | Status: SHIPPED | OUTPATIENT
Start: 2021-05-25 | End: 2021-05-28

## 2021-05-25 RX ORDER — NICOTINE 21 MG/24HR
1 PATCH, TRANSDERMAL 24 HOURS TRANSDERMAL DAILY
Status: DISCONTINUED | OUTPATIENT
Start: 2021-05-25 | End: 2021-05-25 | Stop reason: HOSPADM

## 2021-05-25 RX ORDER — SENNA AND DOCUSATE SODIUM 50; 8.6 MG/1; MG/1
1 TABLET, FILM COATED ORAL 2 TIMES DAILY
Qty: 60 TABLET | Refills: 0 | Status: SHIPPED | OUTPATIENT
Start: 2021-05-25 | End: 2021-09-14

## 2021-05-25 RX ORDER — CYCLOBENZAPRINE HCL 10 MG
10 TABLET ORAL 3 TIMES DAILY PRN
Qty: 60 TABLET | Refills: 0 | Status: SHIPPED | OUTPATIENT
Start: 2021-05-25

## 2021-05-25 RX ORDER — OXYCODONE AND ACETAMINOPHEN 10; 325 MG/1; MG/1
1-2 TABLET ORAL EVERY 4 HOURS PRN
Qty: 60 TABLET | Refills: 0 | Status: SHIPPED | OUTPATIENT
Start: 2021-05-25 | End: 2021-06-01

## 2021-05-25 RX ADMIN — CEFAZOLIN SODIUM 2000 MG: 10 INJECTION, POWDER, FOR SOLUTION INTRAVENOUS at 03:02

## 2021-05-25 RX ADMIN — OXYCODONE HYDROCHLORIDE AND ACETAMINOPHEN 2 TABLET: 10; 325 TABLET ORAL at 11:52

## 2021-05-25 RX ADMIN — HYDROMORPHONE HYDROCHLORIDE 0.5 MG: 1 INJECTION, SOLUTION INTRAMUSCULAR; INTRAVENOUS; SUBCUTANEOUS at 00:14

## 2021-05-25 RX ADMIN — DEXAMETHASONE SODIUM PHOSPHATE 6 MG: 10 INJECTION, SOLUTION INTRAMUSCULAR; INTRAVENOUS at 09:38

## 2021-05-25 RX ADMIN — POLYETHYLENE GLYCOL 3350 17 G: 17 POWDER, FOR SOLUTION ORAL at 07:46

## 2021-05-25 RX ADMIN — SODIUM CHLORIDE: 9 INJECTION, SOLUTION INTRAVENOUS at 11:40

## 2021-05-25 RX ADMIN — OXYCODONE HYDROCHLORIDE AND ACETAMINOPHEN 2 TABLET: 10; 325 TABLET ORAL at 03:02

## 2021-05-25 RX ADMIN — DEXAMETHASONE SODIUM PHOSPHATE 6 MG: 10 INJECTION, SOLUTION INTRAMUSCULAR; INTRAVENOUS at 03:02

## 2021-05-25 RX ADMIN — OXYCODONE HYDROCHLORIDE AND ACETAMINOPHEN 2 TABLET: 10; 325 TABLET ORAL at 07:46

## 2021-05-25 RX ADMIN — DOCUSATE SODIUM 50MG AND SENNOSIDES 8.6MG 1 TABLET: 8.6; 5 TABLET, FILM COATED ORAL at 07:46

## 2021-05-25 RX ADMIN — SODIUM CHLORIDE: 9 INJECTION, SOLUTION INTRAVENOUS at 03:02

## 2021-05-25 RX ADMIN — HYDROMORPHONE HYDROCHLORIDE 0.5 MG: 1 INJECTION, SOLUTION INTRAMUSCULAR; INTRAVENOUS; SUBCUTANEOUS at 05:40

## 2021-05-25 RX ADMIN — CYCLOBENZAPRINE 10 MG: 10 TABLET, FILM COATED ORAL at 00:14

## 2021-05-25 RX ADMIN — MORPHINE SULFATE 15 MG: 15 TABLET, FILM COATED, EXTENDED RELEASE ORAL at 07:45

## 2021-05-25 ASSESSMENT — PAIN SCALES - GENERAL
PAINLEVEL_OUTOF10: 7
PAINLEVEL_OUTOF10: 8
PAINLEVEL_OUTOF10: 7

## 2021-05-25 ASSESSMENT — PAIN DESCRIPTION - LOCATION
LOCATION: NECK

## 2021-05-25 ASSESSMENT — PAIN DESCRIPTION - ONSET
ONSET: ON-GOING
ONSET: ON-GOING

## 2021-05-25 ASSESSMENT — PAIN DESCRIPTION - DESCRIPTORS
DESCRIPTORS: DISCOMFORT;SORE
DESCRIPTORS: ACHING;DISCOMFORT

## 2021-05-25 ASSESSMENT — PAIN - FUNCTIONAL ASSESSMENT
PAIN_FUNCTIONAL_ASSESSMENT: ACTIVITIES ARE NOT PREVENTED
PAIN_FUNCTIONAL_ASSESSMENT: ACTIVITIES ARE NOT PREVENTED
PAIN_FUNCTIONAL_ASSESSMENT: PREVENTS OR INTERFERES SOME ACTIVE ACTIVITIES AND ADLS

## 2021-05-25 ASSESSMENT — PAIN DESCRIPTION - PAIN TYPE
TYPE: SURGICAL PAIN
TYPE: SURGICAL PAIN

## 2021-05-25 ASSESSMENT — PAIN DESCRIPTION - PROGRESSION: CLINICAL_PROGRESSION: NOT CHANGED

## 2021-05-25 ASSESSMENT — PAIN DESCRIPTION - FREQUENCY
FREQUENCY: CONTINUOUS
FREQUENCY: CONTINUOUS

## 2021-05-25 NOTE — PROGRESS NOTES
[facility]  Attending Progress Note  5/25/2021  8:00 AM     Marcelina Ledbetter [facility]    1960   [id number]      SUBJECTIVE:  Doing well. Pain controlled. Has been up ambulating but very unsteady due to myelopathy. Generalized weakness as before surgery. Leg feels stronger than before. Hands still with tingling. No CP/SOB    OBJECTIVE      Physical      VITALS:  BP (!) 143/79   Pulse 82   Temp 97.7 °F (36.5 °C) (Oral)   Resp 16   Ht 5' 8\" (1.727 m)   Wt 150 lb (68 kg)   SpO2 92%   BMI 22.81 kg/m²     Dressing C/D/I    NEUROLOGIC: Alert and Oriented x 3.   Strength 5/5 HF, 5/5 Q, 5/5 TA, 5/5 EHL, 5/5 GS. 5/5 D, 5/5 B, 5/5 T, 5/5 WE, 5/5 WF, 5/5 I                                                                  Sensation intact except tingling in hands     JULIANA = 10ml    Data  CBC:   Lab Results   Component Value Date    WBC 12.6 05/25/2021    RBC 4.24 05/25/2021    HGB 13.1 05/25/2021    HCT 40.6 05/25/2021    MCV 95.8 05/25/2021    MCH 30.9 05/25/2021    MCHC 32.3 05/25/2021    RDW 13.6 05/25/2021     05/25/2021    MPV 9.7 05/25/2021     BMP:    Lab Results   Component Value Date     05/25/2021    K 4.4 05/25/2021     05/25/2021    CO2 25 05/25/2021    BUN 14 05/25/2021    CREATININE 0.67 05/25/2021    CALCIUM 8.4 05/25/2021    GFRAA >60 05/25/2021    LABGLOM >60 05/25/2021    GLUCOSE 149 05/25/2021           Current Inpatient Medications    Current Facility-Administered Medications: nicotine (NICODERM CQ) 14 MG/24HR 1 patch, 1 patch, Transdermal, Daily  Gabapentin (Once-Daily) TABS 1,800 mg, 1,800 mg, Oral, Nightly  traZODone (DESYREL) tablet 100 mg, 100 mg, Oral, Nightly  0.9 % sodium chloride infusion, , Intravenous, Continuous  sodium chloride flush 0.9 % injection 10 mL, 10 mL, Intravenous, 2 times per day  sodium chloride flush 0.9 % injection 10 mL, 10 mL, Intravenous, PRN  0.9 % sodium chloride infusion, 25 mL, Intravenous, PRN  cyclobenzaprine (FLEXERIL) tablet 10 mg, 10 mg, Oral, TID PRN  HYDROmorphone (DILAUDID) injection 0.25 mg, 0.25 mg, Intravenous, Q3H PRN **OR** HYDROmorphone (DILAUDID) injection 0.5 mg, 0.5 mg, Intravenous, Q3H PRN  polyethylene glycol (GLYCOLAX) packet 17 g, 17 g, Oral, Daily  sennosides-docusate sodium (SENOKOT-S) 8.6-50 MG tablet 1 tablet, 1 tablet, Oral, BID  promethazine (PHENERGAN) tablet 12.5 mg, 12.5 mg, Oral, Q6H PRN **OR** ondansetron (ZOFRAN) injection 4 mg, 4 mg, Intravenous, Q6H PRN  oxyCODONE-acetaminophen (PERCOCET)  MG per tablet 1 tablet, 1 tablet, Oral, Q4H PRN  oxyCODONE-acetaminophen (PERCOCET)  MG per tablet 2 tablet, 2 tablet, Oral, Q4H PRN  dexamethasone (PF) (DECADRON) injection 6 mg, 6 mg, Intravenous, Q8H  morphine (MS CONTIN) extended release tablet 15 mg, 15 mg, Oral, 2 times per day    ASSESSMENT AND PLAN    57 y.o. male status post C4-6 ACDF, C4-7 instrumentation, HWR C6-7 post op day #  1    1. PT- WBAT  2. Pain control  3. EPC  4. D/C plan for home later today  5. Drain out  6.  Will need walker for home due to gait instability after spine surgery      Beulah Delaney MD  [facility]  Spine Surgeon  [phone]

## 2021-05-25 NOTE — PLAN OF CARE
Problem: Pain:  Goal: Pain level will decrease  Description: Pain level will decrease  5/25/2021 1120 by Jia Mclean RN  Outcome: Ongoing     Problem: Pain:  Goal: Control of acute pain  Description: Control of acute pain  5/25/2021 1120 by Jia Mclean RN  Outcome: Ongoing     Problem: Falls - Risk of:  Goal: Will remain free from falls  Description: Will remain free from falls  5/25/2021 1120 by Jia Mclean RN  Outcome: Ongoing     Problem: Falls - Risk of:  Goal: Absence of physical injury  Description: Absence of physical injury  5/25/2021 1120 by Jia Mclean RN  Outcome: Ongoing     Problem: ABCDS Injury Assessment  Goal: Absence of physical injury  Outcome: Ongoing     Problem: Tobacco Use:  Goal: Inpatient tobacco use cessation counseling participation  Description: Inpatient tobacco use cessation counseling participation  Outcome: Ongoing

## 2021-05-25 NOTE — PROGRESS NOTES
Physical Therapy    Facility/Department: [facility] MED SURG  Initial Assessment    NAME: Dianna Enciso  : 1960  MRN: [id number]    Date of Service: 2021    Discharge Recommendations:  Patient would benefit from continued therapy after discharge   PT Equipment Recommendations  Equipment Needed: Yes  Mobility Devices: Radha Reeves: Rolling  Pt presented to surgery on 21 for:      1. C4-5 anterior cervical diskectomy and fusion. 2. C5-6 anterior cervical diskectomy and fusion. 3. Insertion of interbody cages at C4-5 and C5-6  4. Insertion of anterior cervical plate and screws from C4-7,      which is a 3-level instrumentation utilizing Medtronic Zevo plate and screws. 5.  Deep spinal hardware removal C6-7  6. [place] of local bone for spinal fusion. 7. Use of allograft bone for spinal fusion to include Osteocel secondary C4-6 cervical spondylotic with myelopathy. Retained HWR C6-7    RN reports patient is medically stable for therapy treatment this date. Chart reviewed prior to treatment and patient is agreeable for therapy. Assessment   Body structures, Functions, Activity limitations: Decreased functional mobility ; Decreased safe awareness;Decreased balance  Assessment: Pt tolerated this treatment well and increased ambulation distance to [facility] for D/C home today. Pt completed bed mobility, transfers, and walking safely including stairs. By end of visit, pt demonstrating safe functional mobility, gait & steps. Pt Ed on & issued written pt ed handout and demonstrates competence with restrictions and importance of home walking at D/C until off of spinal precautions. Pt safe to D/C home & progress to OP PT for reconditioing when appropriate by Dr Marysol Martinez.   Prognosis: Good  Decision Making: Medium Complexity  Exam: ROM, MMT, functional mobility, activity tolerance, Balance, & [employer] AM-PAC 6 Clicks Basic Mobility  Clinical Presentation: evolving  REQUIRES PT FOLLOW UP: Yes  Activity Status: Impaired (Pt reports N/T in hands)  Bed mobility  Supine to Sit: Minimal assistance  Sit to Supine: Supervision  Scooting: Supervision  Comment: Ed to keep head/neck in neutral alignment as he completes bed mobility & requires use of UB support  Transfers  Sit to Stand: Minimal Assistance  Stand to sit: Minimal Assistance  Bed to Chair: Minimal assistance  Stand Pivot Transfers: Contact guard assistance  Lateral Transfers: Contact guard assistance  Comment: Ed + tactile assist on correct use of upper body for safe sit/stand  Ambulation  Ambulation?: Yes  More Ambulation?: Yes  Ambulation 1  Surface: level tile  Device: No Device (needed support of IV pole)  Assistance: Minimal assistance  Quality of Gait: step to with ataxic pattern  Distance: 60ft  Ambulation 2  Surface - 2: level tile  Device 2: Rolling Walker  Assistance 2: Stand by assistance  Quality of Gait 2: step to & much safer with R/walker  Distance: 200ft  Stairs/Curb  Stairs?: Yes  Stairs 1  # Steps : 5  Stairs Height: 6\"  Rails: Bilateral  Assistance: Stand by assistance    Stairs 2  # Steps : 5  Stairs Height: 6\"  Rails: Bilateral  Assistance: Supervision    Ambulation 3  Surface - 3: level tile  Device 3: Rolling Walker  Assistance 3: Supervision  Quality of Gait 3: step to pattern  Gait Deviations: Slow Lili  Distance: 400ft       Balance  Sitting - Static: Good  Sitting - Dynamic: Good  Standing - Static: Good;-  Standing - Dynamic: Fair;+  Exercises  Comments: Ed spinal precautions, wearing of cervical collar at all times except for bathing & dressing, postural ex's, energy conservation & safety principles, prevention sedentary complications & home walking program    All lines intact, call light within reach, and patient positioned comfortably at end of treatment. All patient needs addressed prior to ending therapy session.         Plan   Plan  Times per week: this visit for functional mobility, transfers, gair, stair training & pt education  Safety Devices  Type of devices: Bed alarm in place, Call light within reach, Chair alarm in place, Gait belt, Patient at risk for falls, Left in bed    G-Code       OutComes Score                                                  AM-PAC Score  AM-PAC Inpatient Mobility Raw Score : 24 (05/25/21 1055)  AM-PAC Inpatient T-Scale Score : 61.14 (05/25/21 1055)  Mobility Inpatient CMS 0-100% Score: 0 (05/25/21 1055)  Mobility Inpatient CMS G-Code Modifier : [address] (05/25/21 1055)          Goals  Short term goals  Time Frame for Short term goals: 6 visits  Short term goal 1: Inc bed-mobility & transfers to independent to enable pt to safely get in/OOB & chair  Short term goal 2:  Inc gait to amb 400 ft or > indep w/ RW to enable pt to return to previous level of independence & promotion of home walking program  Short term goal 3: Pt able to go up/down 4 steps with Hugh rails  Short term goal 4: Ed spinal precautions, wearing of cervical collar at all times except for bathing & dressing, postural ex's, energy conservation & safety principles, prevention sedentary complications & home walking program, & issue written pt education       Therapy Time   Individual Concurrent Group Co-treatment   Time In 1003         Time Out 1055         Minutes 52+10=62                 STEFANI LEE, PT

## 2021-05-25 NOTE — PLAN OF CARE
Problem: Pain:  Goal: Pain level will decrease  Description: Pain level will decrease  Outcome: Ongoing  Goal: Control of acute pain  Description: Control of acute pain  Outcome: Ongoing  Note: Pain level assessment complete. Patient educated on pain scale and control interventions  PRN pain medication given per patient request  Patient instructed to call out with new onset of pain or unrelieved pain   Goal: Control of chronic pain  Description: Control of chronic pain  Outcome: Ongoing     Problem: Falls - Risk of:  Goal: Will remain free from falls  Description: Will remain free from falls  Outcome: Ongoing  Note: Siderails up x 2  Hourly rounding  Call light in reach  Instructed to call for assist before attempting out of bed.   Remains free from falls and accidental injury at this time   Floor free from obstacles  Bed is locked and in lowest position  Adequate lighting provided  Bed alarm on, [place] star and Stay with Me signs posted      Goal: Absence of physical injury  Description: Absence of physical injury  Outcome: Ongoing

## 2021-05-25 NOTE — CARE COORDINATION
Case Management Initial Discharge Plan  Aleena Albert,             Met with:patient to discuss discharge plans. Information verified: address, contacts, phone number, , insurance Yes  PCP: Grey Primrose, MD  Date of last visit: May, 2021    Insurance Provider: Prateek Tucker [address]    Discharge Planning    Living Arrangements:  Spouse/Significant Other   Support Systems:  Spouse/Significant Other    Home has 2 stories  3 stairs to climb to get into front door, 15 stairs to climb to reach second floor  Location of bedroom/bathroom in home  - second floor    Patient able to perform ADL's:Independent    Current Services (outpatient & in home) none  DME equipment: none  DME provider: [facility]    Pharmacy: [facility] [place]    Potential Assistance Needed:  Yoselin Godwin    Patient agreeable to home care: No  [place] of choice provided:  n/a    Prior SNF/Rehab Placement and Facility: No  Agreeable to SNF/Rehab: No  [place] of choice provided: n/a   Evaluation: n/a    Expected Discharge date:  21  Patient expects to be discharged to:  home  Follow Up Appointment: Best Day/ Time: Monday AM    Transportation provider: wife  Transportation arrangements needed for discharge: No    Readmission Risk              Risk of Unplanned Readmission:  8             Does patient have a readmission risk score greater than 14?: No  If yes, follow-up appointment must be made within 7 days of discharge. Goal of Care:       Discharge Plan: Met with patient at bedside. Lives with wife, independent and works for [employer]. POD#1 cervical fusion. Cervical collar in place. Walker ordered from [employer] due to pt having myelopathy. Eduardo Montez informed and will deliver to room today prior to D/C. Post op appt with Dr. Lázaro Rodriguez is 6-8 at 1:45am and pt informed. D/C plan is for home later today with wife.             Electronically signed by Tyronne Sandhoff, RN on 21 at 8:30 AM EDT

## 2021-05-25 NOTE — PROGRESS NOTES
Pt discharged to in condition with belongings  Discharge instructions given  \"Meds To Beds\" medication at bedside  Pt denies having any further questions at this time  Patient/family state they have everything they were admitted with. Dressing change supplies and hibiclens sent home with patient. Pt and wife feel comfortable with dressing change instructions.

## 2021-05-25 NOTE — PROGRESS NOTES
CLINICAL PHARMACY NOTE: MEDS TO BEDS    Total # of Prescriptions Filled: 5   The following medications were delivered to the patient:  · PERCOCET   · MORPHINE SUL ER 15MG  · SENNA PLUS 8.6-50MG  · CYCLOBEBZAPRINE 10MG  · GABAPENTIN 600MG    Additional Documentation:

## 2021-05-25 NOTE — PROGRESS NOTES
Pt refusing to wait for walker to be delivered to room. Pt is asking for walker to be delivered to his home. Cesar Davidson RN discharge planning notified and will inform [facility]. Pt does have a walker from home at bedside that he will use until one is delivered.

## 2021-05-25 NOTE — PROGRESS NOTES
Patient ambulated to end [facility] with walker and standby assist without difficulty.

## 2021-05-25 NOTE — ACP (ADVANCE CARE PLANNING)
Advance Care Planning     Advance Care Planning Activator (Inpatient)  Conversation Note      Date of ACP Conversation: 5/25/2021     Conversation Conducted with: Patient with Decision Making Capacity    ACP Activator: Erika Acevedo RN    {When Decision Maker makes decisions on behalf of the incapacitated patient: Decision Maker is asked to consider and make decisions based on patient values, known preferences, or best interests. Health Care Decision Maker:     Current Designated Health Care Decision Maker:   Paul Fernandez (spouse)  Phone: [phone]    Click here to complete Healthcare Decision Makers including section of the Healthcare Decision Maker Relationship (ie \"Primary\")    Care Preferences    Ventilation: \"If you were in your present state of health and suddenly became very ill and were unable to breathe on your own, what would your preference be about the use of a ventilator (breathing machine) if it were available to you? \"      Would the patient desire the use of ventilator (breathing machine)?: yes    \"If your health worsens and it becomes clear that your chance of recovery is unlikely, what would your preference be about the use of a ventilator (breathing machine) if it were available to you? \"     Would the patient desire the use of ventilator (breathing machine)?: Yes      Resuscitation       [] Yes   [x] No   Educated Patient / Jay Low regarding differences between Advance Directives and portable DNR orders.     Length of ACP Conversation in minutes:  5    Conversation Outcomes:  [x] ACP discussion completed  [] Existing advance directive reviewed with patient; no changes to patient's previously recorded wishes  [] New Advance Directive completed  [] Portable Do Not Rescitate prepared for Provider review and signature  [] POLST/POST/MOLST/MOST prepared for Provider review and signature      Follow-up plan:    [] Schedule follow-up conversation to continue planning  [] Referred individual to Provider for additional questions/concerns   [] Advised patient/agent/surrogate to review completed ACP document and update if needed with changes in condition, patient preferences or care setting    [] This note routed to one or more involved healthcare providers

## 2021-09-14 ENCOUNTER — HOSPITAL ENCOUNTER (INPATIENT)
Age: 61
LOS: 8 days | Discharge: HOME | DRG: 200 | End: 2021-09-22
Attending: INTERNAL MEDICINE | Admitting: INTERNAL MEDICINE
Payer: COMMERCIAL

## 2021-09-14 ENCOUNTER — HOSPITAL ENCOUNTER (OUTPATIENT)
Dept: CT IMAGING | Age: 61
Discharge: HOME | DRG: 200 | End: 2021-09-16
Payer: COMMERCIAL

## 2021-09-14 VITALS
SYSTOLIC BLOOD PRESSURE: 154 MMHG | HEART RATE: 81 BPM | RESPIRATION RATE: 20 BRPM | BODY MASS INDEX: 21.75 KG/M2 | DIASTOLIC BLOOD PRESSURE: 81 MMHG | WEIGHT: 143.5 LBS | TEMPERATURE: 98.1 F | HEIGHT: 68 IN | OXYGEN SATURATION: 100 %

## 2021-09-14 DIAGNOSIS — R91.8 LUNG MASS: ICD-10-CM

## 2021-09-14 PROBLEM — J93.9 PNEUMOTHORAX: Status: ACTIVE | Noted: 2021-09-14

## 2021-09-14 PROBLEM — J95.811 POSTPROCEDURAL PNEUMOTHORAX: Status: ACTIVE | Noted: 2021-09-14

## 2021-09-14 LAB
ABSOLUTE EOS #: 0.09 K/UL (ref 0–0.44)
ABSOLUTE IMMATURE GRANULOCYTE: 0.06 K/UL (ref 0–0.3)
ABSOLUTE LYMPH #: 1.37 K/UL (ref 1.1–3.7)
ABSOLUTE MONO #: 0.93 K/UL (ref 0.1–1.2)
ANION GAP SERPL CALCULATED.3IONS-SCNC: 9 MMOL/L (ref 9–17)
BASOPHILS # BLD: 1 % (ref 0–2)
BASOPHILS ABSOLUTE: 0.06 K/UL (ref 0–0.2)
BUN BLDV-MCNC: 9 MG/DL (ref 8–23)
BUN/CREAT BLD: 12 (ref 9–20)
CALCIUM SERPL-MCNC: 8.8 MG/DL (ref 8.6–10.4)
CHLORIDE BLD-SCNC: 104 MMOL/L (ref 98–107)
CO2: 27 MMOL/L (ref 20–31)
CREAT SERPL-MCNC: 0.73 MG/DL (ref 0.7–1.2)
DIFFERENTIAL TYPE: ABNORMAL
EOSINOPHILS RELATIVE PERCENT: 1 % (ref 1–4)
GFR AFRICAN AMERICAN: >60 ML/MIN
GFR NON-AFRICAN AMERICAN: >60 ML/MIN
GFR SERPL CREATININE-BSD FRML MDRD: ABNORMAL ML/MIN/{1.73_M2}
GFR SERPL CREATININE-BSD FRML MDRD: ABNORMAL ML/MIN/{1.73_M2}
GLUCOSE BLD-MCNC: 111 MG/DL (ref 70–99)
HCT VFR BLD CALC: 38.6 % (ref 40.7–50.3)
HEMOGLOBIN: 12.8 G/DL (ref 13–17)
IMMATURE GRANULOCYTES: 1 %
INR BLD: 0.9
LYMPHOCYTES # BLD: 16 % (ref 24–43)
MCH RBC QN AUTO: 31.8 PG (ref 25.2–33.5)
MCHC RBC AUTO-ENTMCNC: 33.2 G/DL (ref 28.4–34.8)
MCV RBC AUTO: 95.8 FL (ref 82.6–102.9)
MONOCYTES # BLD: 11 % (ref 3–12)
NRBC AUTOMATED: 0 PER 100 WBC
PARTIAL THROMBOPLASTIN TIME: 26.1 SEC (ref 23.9–33.8)
PDW BLD-RTO: 13.5 % (ref 11.8–14.4)
PLATELET # BLD: 260 K/UL (ref 138–453)
PLATELET # BLD: 315 K/UL (ref 138–453)
PLATELET ESTIMATE: ABNORMAL
PMV BLD AUTO: 9 FL (ref 8.1–13.5)
POTASSIUM SERPL-SCNC: 3.9 MMOL/L (ref 3.7–5.3)
PROTHROMBIN TIME: 12.4 SEC (ref 11.5–14.2)
RBC # BLD: 4.03 M/UL (ref 4.21–5.77)
RBC # BLD: ABNORMAL 10*6/UL
SARS-COV-2, RAPID: NOT DETECTED
SEG NEUTROPHILS: 70 % (ref 36–65)
SEGMENTED NEUTROPHILS ABSOLUTE COUNT: 6.09 K/UL (ref 1.5–8.1)
SODIUM BLD-SCNC: 140 MMOL/L (ref 135–144)
SPECIMEN DESCRIPTION: NORMAL
WBC # BLD: 8.6 K/UL (ref 3.5–11.3)
WBC # BLD: ABNORMAL 10*3/UL

## 2021-09-14 PROCEDURE — 80048 BASIC METABOLIC PNL TOTAL CA: CPT

## 2021-09-14 PROCEDURE — 88334 PATH CONSLTJ SURG CYTO XM EA: CPT

## 2021-09-14 PROCEDURE — 36415 COLL VENOUS BLD VENIPUNCTURE: CPT

## 2021-09-14 PROCEDURE — 94640 AIRWAY INHALATION TREATMENT: CPT

## 2021-09-14 PROCEDURE — 6370000000 HC RX 637 (ALT 250 FOR IP): Performed by: INTERNAL MEDICINE

## 2021-09-14 PROCEDURE — 2580000003 HC RX 258: Performed by: NURSE PRACTITIONER

## 2021-09-14 PROCEDURE — 85049 AUTOMATED PLATELET COUNT: CPT

## 2021-09-14 PROCEDURE — 87635 SARS-COV-2 COVID-19 AMP PRB: CPT

## 2021-09-14 PROCEDURE — 7100000000 HC PACU RECOVERY - FIRST 15 MIN

## 2021-09-14 PROCEDURE — 2580000003 HC RX 258: Performed by: RADIOLOGY

## 2021-09-14 PROCEDURE — 85610 PROTHROMBIN TIME: CPT

## 2021-09-14 PROCEDURE — C1729 CATH, DRAINAGE: HCPCS

## 2021-09-14 PROCEDURE — 0BBD3ZX EXCISION OF RIGHT MIDDLE LUNG LOBE, PERCUTANEOUS APPROACH, DIAGNOSTIC: ICD-10-PCS | Performed by: RADIOLOGY

## 2021-09-14 PROCEDURE — 88333 PATH CONSLTJ SURG CYTO XM 1: CPT

## 2021-09-14 PROCEDURE — 32551 INSERTION OF CHEST TUBE: CPT

## 2021-09-14 PROCEDURE — 6370000000 HC RX 637 (ALT 250 FOR IP): Performed by: NURSE PRACTITIONER

## 2021-09-14 PROCEDURE — 77012 CT SCAN FOR NEEDLE BIOPSY: CPT

## 2021-09-14 PROCEDURE — 6360000002 HC RX W HCPCS: Performed by: RADIOLOGY

## 2021-09-14 PROCEDURE — 2060000000 HC ICU INTERMEDIATE R&B

## 2021-09-14 PROCEDURE — 0W9930Z DRAINAGE OF RIGHT PLEURAL CAVITY WITH DRAINAGE DEVICE, PERCUTANEOUS APPROACH: ICD-10-PCS | Performed by: RADIOLOGY

## 2021-09-14 PROCEDURE — 85730 THROMBOPLASTIN TIME PARTIAL: CPT

## 2021-09-14 PROCEDURE — 7100000001 HC PACU RECOVERY - ADDTL 15 MIN

## 2021-09-14 PROCEDURE — 85025 COMPLETE CBC W/AUTO DIFF WBC: CPT

## 2021-09-14 PROCEDURE — 6370000000 HC RX 637 (ALT 250 FOR IP): Performed by: RADIOLOGY

## 2021-09-14 PROCEDURE — 88305 TISSUE EXAM BY PATHOLOGIST: CPT

## 2021-09-14 PROCEDURE — 6360000002 HC RX W HCPCS: Performed by: NURSE PRACTITIONER

## 2021-09-14 RX ORDER — FENTANYL CITRATE 50 UG/ML
INJECTION, SOLUTION INTRAMUSCULAR; INTRAVENOUS
Status: COMPLETED | OUTPATIENT
Start: 2021-09-14 | End: 2021-09-14

## 2021-09-14 RX ORDER — SODIUM CHLORIDE 0.9 % (FLUSH) 0.9 %
10 SYRINGE (ML) INJECTION PRN
Status: DISCONTINUED | OUTPATIENT
Start: 2021-09-14 | End: 2021-09-22 | Stop reason: HOSPADM

## 2021-09-14 RX ORDER — SODIUM CHLORIDE 0.9 % (FLUSH) 0.9 %
5-40 SYRINGE (ML) INJECTION PRN
Status: DISCONTINUED | OUTPATIENT
Start: 2021-09-14 | End: 2021-09-17 | Stop reason: HOSPADM

## 2021-09-14 RX ORDER — UMECLIDINIUM BROMIDE AND VILANTEROL TRIFENATATE 62.5; 25 UG/1; UG/1
1 POWDER RESPIRATORY (INHALATION) DAILY
COMMUNITY
Start: 2021-09-03

## 2021-09-14 RX ORDER — POLYETHYLENE GLYCOL 3350 17 G/17G
17 POWDER, FOR SOLUTION ORAL DAILY PRN
Status: DISCONTINUED | OUTPATIENT
Start: 2021-09-14 | End: 2021-09-22 | Stop reason: HOSPADM

## 2021-09-14 RX ORDER — CYCLOBENZAPRINE HCL 10 MG
10 TABLET ORAL 3 TIMES DAILY PRN
Status: DISCONTINUED | OUTPATIENT
Start: 2021-09-14 | End: 2021-09-22 | Stop reason: HOSPADM

## 2021-09-14 RX ORDER — ONDANSETRON 4 MG/1
4 TABLET, ORALLY DISINTEGRATING ORAL EVERY 8 HOURS PRN
Status: DISCONTINUED | OUTPATIENT
Start: 2021-09-14 | End: 2021-09-22 | Stop reason: HOSPADM

## 2021-09-14 RX ORDER — OXYCODONE AND ACETAMINOPHEN 10; 325 MG/1; MG/1
1 TABLET ORAL 4 TIMES DAILY PRN
Status: DISCONTINUED | OUTPATIENT
Start: 2021-09-14 | End: 2021-09-22 | Stop reason: HOSPADM

## 2021-09-14 RX ORDER — OXYCODONE HYDROCHLORIDE AND ACETAMINOPHEN 5; 325 MG/1; MG/1
2 TABLET ORAL EVERY 4 HOURS PRN
Status: DISCONTINUED | OUTPATIENT
Start: 2021-09-14 | End: 2021-09-14

## 2021-09-14 RX ORDER — ACETAMINOPHEN 650 MG/1
650 SUPPOSITORY RECTAL EVERY 6 HOURS PRN
Status: DISCONTINUED | OUTPATIENT
Start: 2021-09-14 | End: 2021-09-22 | Stop reason: HOSPADM

## 2021-09-14 RX ORDER — POTASSIUM CHLORIDE 7.45 MG/ML
10 INJECTION INTRAVENOUS PRN
Status: DISCONTINUED | OUTPATIENT
Start: 2021-09-14 | End: 2021-09-22 | Stop reason: HOSPADM

## 2021-09-14 RX ORDER — ACETAMINOPHEN 325 MG/1
650 TABLET ORAL EVERY 6 HOURS PRN
Status: DISCONTINUED | OUTPATIENT
Start: 2021-09-14 | End: 2021-09-22 | Stop reason: HOSPADM

## 2021-09-14 RX ORDER — MAGNESIUM SULFATE 1 G/100ML
1000 INJECTION INTRAVENOUS PRN
Status: DISCONTINUED | OUTPATIENT
Start: 2021-09-14 | End: 2021-09-22 | Stop reason: HOSPADM

## 2021-09-14 RX ORDER — OXYCODONE AND ACETAMINOPHEN 10; 325 MG/1; MG/1
1 TABLET ORAL ONCE
Status: COMPLETED | OUTPATIENT
Start: 2021-09-14 | End: 2021-09-14

## 2021-09-14 RX ORDER — SODIUM CHLORIDE 0.9 % (FLUSH) 0.9 %
5-40 SYRINGE (ML) INJECTION EVERY 12 HOURS SCHEDULED
Status: DISCONTINUED | OUTPATIENT
Start: 2021-09-14 | End: 2021-09-22 | Stop reason: HOSPADM

## 2021-09-14 RX ORDER — SODIUM CHLORIDE 9 MG/ML
25 INJECTION, SOLUTION INTRAVENOUS PRN
Status: DISCONTINUED | OUTPATIENT
Start: 2021-09-14 | End: 2021-09-22 | Stop reason: HOSPADM

## 2021-09-14 RX ORDER — MIDAZOLAM HYDROCHLORIDE 1 MG/ML
INJECTION INTRAMUSCULAR; INTRAVENOUS
Status: COMPLETED | OUTPATIENT
Start: 2021-09-14 | End: 2021-09-14

## 2021-09-14 RX ORDER — POTASSIUM CHLORIDE 20 MEQ/1
40 TABLET, EXTENDED RELEASE ORAL PRN
Status: DISCONTINUED | OUTPATIENT
Start: 2021-09-14 | End: 2021-09-22 | Stop reason: HOSPADM

## 2021-09-14 RX ORDER — SODIUM CHLORIDE 9 MG/ML
25 INJECTION, SOLUTION INTRAVENOUS PRN
Status: DISCONTINUED | OUTPATIENT
Start: 2021-09-14 | End: 2021-09-17 | Stop reason: HOSPADM

## 2021-09-14 RX ORDER — OXYCODONE AND ACETAMINOPHEN 10; 325 MG/1; MG/1
1 TABLET ORAL EVERY 4 HOURS PRN
COMMUNITY

## 2021-09-14 RX ORDER — TRAZODONE HYDROCHLORIDE 100 MG/1
100 TABLET ORAL NIGHTLY
Status: DISCONTINUED | OUTPATIENT
Start: 2021-09-14 | End: 2021-09-22 | Stop reason: HOSPADM

## 2021-09-14 RX ORDER — ONDANSETRON 2 MG/ML
4 INJECTION INTRAMUSCULAR; INTRAVENOUS EVERY 6 HOURS PRN
Status: DISCONTINUED | OUTPATIENT
Start: 2021-09-14 | End: 2021-09-22 | Stop reason: HOSPADM

## 2021-09-14 RX ORDER — GABAPENTIN 300 MG/1
600 CAPSULE ORAL 3 TIMES DAILY
Status: DISCONTINUED | OUTPATIENT
Start: 2021-09-14 | End: 2021-09-22 | Stop reason: HOSPADM

## 2021-09-14 RX ADMIN — CYCLOBENZAPRINE 10 MG: 10 TABLET, FILM COATED ORAL at 20:34

## 2021-09-14 RX ADMIN — TRAZODONE HYDROCHLORIDE 100 MG: 100 TABLET ORAL at 20:34

## 2021-09-14 RX ADMIN — SODIUM CHLORIDE, PRESERVATIVE FREE 10 ML: 5 INJECTION INTRAVENOUS at 20:34

## 2021-09-14 RX ADMIN — GLYCOPYRROLATE AND FORMOTEROL FUMARATE 2 PUFF: 9; 4.8 AEROSOL, METERED RESPIRATORY (INHALATION) at 20:11

## 2021-09-14 RX ADMIN — FENTANYL CITRATE 50 MCG: 50 INJECTION INTRAMUSCULAR; INTRAVENOUS at 11:10

## 2021-09-14 RX ADMIN — ENOXAPARIN SODIUM 40 MG: 40 INJECTION SUBCUTANEOUS at 22:09

## 2021-09-14 RX ADMIN — GABAPENTIN 600 MG: 300 CAPSULE ORAL at 20:34

## 2021-09-14 RX ADMIN — MIDAZOLAM 1 MG: 1 INJECTION INTRAMUSCULAR; INTRAVENOUS at 10:49

## 2021-09-14 RX ADMIN — FENTANYL CITRATE 50 MCG: 50 INJECTION INTRAMUSCULAR; INTRAVENOUS at 10:50

## 2021-09-14 RX ADMIN — SODIUM CHLORIDE 25 ML: 9 INJECTION, SOLUTION INTRAVENOUS at 09:10

## 2021-09-14 RX ADMIN — MIDAZOLAM 1 MG: 1 INJECTION INTRAMUSCULAR; INTRAVENOUS at 11:10

## 2021-09-14 RX ADMIN — OXYCODONE AND ACETAMINOPHEN 1 TABLET: 10; 325 TABLET ORAL at 23:34

## 2021-09-14 RX ADMIN — OXYCODONE HYDROCHLORIDE AND ACETAMINOPHEN 1 TABLET: 10; 325 TABLET ORAL at 12:56

## 2021-09-14 RX ADMIN — OXYCODONE AND ACETAMINOPHEN 2 TABLET: 5; 325 TABLET ORAL at 17:16

## 2021-09-14 ASSESSMENT — PAIN SCALES - GENERAL
PAINLEVEL_OUTOF10: 8
PAINLEVEL_OUTOF10: 3
PAINLEVEL_OUTOF10: 7
PAINLEVEL_OUTOF10: 7
PAINLEVEL_OUTOF10: 8
PAINLEVEL_OUTOF10: 7
PAINLEVEL_OUTOF10: 7

## 2021-09-14 ASSESSMENT — PAIN - FUNCTIONAL ASSESSMENT: PAIN_FUNCTIONAL_ASSESSMENT: 0-10

## 2021-09-14 NOTE — PROGRESS NOTES
Patient admitted to unit from PACU, via stretcher. Patient oriented to room. Vital signs obtained. Telemetry applied. Call light within reach.

## 2021-09-14 NOTE — H&P
Interval H&P Note    Pt Name: Leatha Moses  MRN: [id number]  YOB: 1960  Date of evaluation: 9/14/2021      [x] I have reviewed the hardcopy Pulmonology Consult Note by Dr Christine Bautista dated 9/2/21 labeled in short chart for an Interval History and Physical note. [x] I have examined  Leatha Moses  There are no changes to the patient who is scheduled for a right lung biopsy in IR by Dr Brent Maldonado for lesion right lung. The patient denies new health changes, fever, chills, wheezing, cough, increased SOB, chest pain, open sores or wounds. Patient needs support behind his upper back when sitting \"so I don't get pain in my neck\". Past Medical History:     Past Medical History:   Diagnosis Date    Arthralgia     Cervical myelopathy ([address]     DDD (degenerative disc disease), lumbar     Lumbar disc herniation     Retrolisthesis     Spondylolisthesis of lumbar region         Past Surgical History:     Past Surgical History:   Procedure Laterality Date    BACK SURGERY  06/01/2018    CERVICAL FUSION N/A 5/24/2021    C 4-6 ACDF        HARDWARE REMOVAL        C6-7 PLATE - [employer]   NEURO MONITORING performed by Rober Self MD at [address]      cage and titanium disc        Medications Prior to Admission:     Prior to Admission medications    Medication Sig Start Date End Date Taking? Authorizing Provider   oxyCODONE-acetaminophen (PERCOCET)  MG per tablet Take 1 tablet by mouth every 4 hours as needed for Pain. Yes Historical Provider, MD Dhara Valdez 62.5-25 MCG/INH AEPB inhaler Inhale 1 puff into the lungs daily 9/3/21  Yes Historical Provider, MD   Gabapentin, Once-Daily, (GRALISE) 600 MG TABS Take 1,800 mg by mouth nightly for 30 days.  5/25/21 9/14/21 Yes Rober Self MD   cyclobenzaprine (FLEXERIL) 10 MG tablet Take 1 tablet by mouth 3 times daily as needed for Muscle spasms 5/25/21  Yes Rober Self MD   traZODone (DESYREL) 100 MG tablet Take 100 mg by mouth nightly   Yes Historical Provider, MD        Allergies:     Patient has no known allergies. Social History:     Tobacco:    reports that he has been smoking cigarettes. He has a 70.00 pack-year smoking history. He has never used smokeless tobacco.  Alcohol:      reports current alcohol use. Drug Use:  reports no history of drug use. Family History:     History reviewed. No pertinent family history. Vital signs: BP (!) 141/75   Pulse 83   Temp 97.7 °F (36.5 °C) (Temporal)   Resp 16   Ht 5' 8\" (1.727 m)   Wt 143 lb 8 oz (65.1 kg)   SpO2 96%   BMI 21.82 kg/m²     Allergies:  Patient has no known allergies. This is a 57 y.o. male who is pleasant, cooperative, alert and oriented x3, in no acute distress  Skin pink, warm, and dry     Physical Exam:  Lungs: Bilateral equal air entry, unlabored, clear to ausculation, no wheezing, rales or rhonchi, normal effort  Cardiovascular: HR 83 normal rate, regular rhythm, no murmur, gallop, rub. Abdomen: Soft, nontender, nondistended, normal bowel sounds. Extremities/Spine: decreased cervical ROM w/o discomfort s/p C4-6 ACDF.5/24/21  Labs:  No results for input(s): HGB, HCT, WBC, MCV, PLT, NA, K, CL, CO2, BUN, CREATININE, GLUCOSE, INR, PROTIME, APTT, AST, ALT, LABALBU, HCG in the last 720 hours. No results for input(s): COVID19 in the last 720 hours.     ADELAIDE Corrigan CNP   Electronically signed 9/14/2021 at 9:06 AM

## 2021-09-14 NOTE — H&P
History and Physical/ admit note      CHIEF COMPLAINT: Pneumothorax    History of Present Illness: 67-year-old gentleman with known history of COPD and chronic smoker who was found to have lung nodule was undergoing CT-guided biopsy had moderate left-sided pneumothorax chest tube was put in and patient admitted to the progressive area on the monitor patient doing fairly well denies any chest pain no shortness of breath no fever no chills has been coughing with yellow phlegm stuffy nose with postnasal drip. Drainage times few days      Past Medical History:   Diagnosis Date    Arthralgia     Cervical myelopathy (HCC)     DDD (degenerative disc disease), lumbar     Lumbar disc herniation     Retrolisthesis     Spondylolisthesis of lumbar region          Past Surgical History:   Procedure Laterality Date    BACK SURGERY  06/01/2018    CERVICAL FUSION N/A 5/24/2021    C 4-6 ACDF        HARDWARE REMOVAL        C6-7 PLATE - [employer]   NEURO MONITORING performed by Raymond Abreu MD at [address] [facility]  9/14/2021    CT GUIDED CHEST TUBE 9/14/2021 [facility] CT SCAN    CT NEEDLE BIOPSY LUNG PERCUTANEOUS  9/14/2021    CT NEEDLE BIOPSY LUNG PERCUTANEOUS 9/14/2021 [facility] CT SCAN    NECK SURGERY      cage and titanium disc       Medications Prior to Admission:    Medications Prior to Admission: oxyCODONE-acetaminophen (PERCOCET)  MG per tablet, Take 1 tablet by mouth every 4 hours as needed for Pain. ANORO ELLIPTA 62.5-25 MCG/INH AEPB inhaler, Inhale 1 puff into the lungs daily  cyclobenzaprine (FLEXERIL) 10 MG tablet, Take 1 tablet by mouth 3 times daily as needed for Muscle spasms  traZODone (DESYREL) 100 MG tablet, Take 100 mg by mouth nightly  Gabapentin, Once-Daily, (GRALISE) 600 MG TABS, Take 1,800 mg by mouth nightly for 30 days. Allergies:    Patient has no known allergies. Social History:    reports that he has been smoking cigarettes.  He has a 70.00 pack-year smoking history. He has never used smokeless tobacco. He reports current alcohol use. He reports that he does not use drugs. Family History:   family history includes Asthma in his mother; Cancer in his brother; Other in his father.     REVIEW OF SYSTEMS:  Constitutional: negative, No fever no chills  Eyes: negative  Ears, nose, mouth, throat, and face: negative  Respiratory: negative, Cough occasional shortness of breath no wheezing  Cardiovascular: negative, Chest pain no palpitation  Gastrointestinal: negative  Genitourinary:negative  Integument/breast: negative  Hematologic/lymphatic: negative  Musculoskeletal:negative  Neurological: negative, No headache no TIA no seizure  Behavioral/Psych: negative  Endocrine: negative  Allergic/Immunologic: negative  PHYSICAL EXAM:  General Appearance: in no acute distress and alert  Skin: Subcutaneous emphysema back head: normocephalic and atraumatic  Eyes: conjunctivae normal and sclera anicteric     Neck: neck supple and non tender without mass   Pulmonary/Chest: Air entry equal decreased at the right base no rhonchi no wheezing no use of intercostal muscles  Cardiovascular: normal rate, regular rhythm, normal S1 and S2, no gallops, intact distal pulses and no carotid bruits  Abdomen: soft, non-tender, non-distended, normal bowel sounds, no masses or organomegaly  Extremities: no edema and good pulses no Homans' sign    Neurologic: Alert oriented x3 with no focal deficit    Vitals:  BP (!) 154/81   Pulse 81   Temp 98.1 °F (36.7 °C) (Oral)   Resp 20   Ht 5' 8\" (1.727 m)   Wt 143 lb (64.9 kg)   BMI 21.74 kg/m²         LABS:  CBC with Differential:    Lab Results   Component Value Date    WBC 12.6 05/25/2021    RBC 4.24 05/25/2021    HGB 13.1 05/25/2021    HCT 40.6 05/25/2021     09/14/2021    MCV 95.8 05/25/2021    MCH 30.9 05/25/2021    MCHC 32.3 05/25/2021    RDW 13.6 05/25/2021     BMP:    Lab Results   Component Value Date     05/25/2021    K 4.4 05/25/2021     05/25/2021    CO2 25 05/25/2021    BUN 14 05/25/2021    CREATININE 0.67 05/25/2021    CALCIUM 8.4 05/25/2021    GFRAA >60 05/25/2021    LABGLOM >60 05/25/2021    GLUCOSE 149 05/25/2021         ASSESSMENT:    Right-sided pneumothorax post procedure right lung nodule  COPD  Chronic smoker  Chronic low back pain  Subcutaneous emphysema  Patient Active Problem List   Diagnosis    Cervical spondylosis with myelopathy    Postprocedural pneumothorax       PLAN:    Meds labs reviewed DVT prophylaxis continue with chest tube pulmonary consult check Covid cultures sputum IV Rocephin Mucinex continue home medications see orders            Shari Galeazzi, MD MD  5:32 PM  9/14/2021

## 2021-09-14 NOTE — BRIEF OP NOTE
Brief Postoperative Note    Leatha Moses  YOB: 1960  [id number]    Pre-operative Diagnosis: RUL nodule    Post-operative Diagnosis: Same    Procedure: CT guided right lung biopsy, right chest tube placement    Anesthesia: Local and moderate sedation    Surgeons/Assistants: Brent Maldonado    Estimated Blood Loss: 0    Complications: None    Specimens: Was Obtained: 20 G x 4    Findings: patient developed large pneumothorax upon introducer needle entry requiring 8 F pigtail placement. Following tube placement, biopsy was performed.     Electronically signed by Page Griffin MD on 9/14/2021 at 12:34 PM

## 2021-09-15 ENCOUNTER — APPOINTMENT (OUTPATIENT)
Dept: GENERAL RADIOLOGY | Age: 61
DRG: 200 | End: 2021-09-15
Attending: INTERNAL MEDICINE
Payer: COMMERCIAL

## 2021-09-15 LAB
ABSOLUTE EOS #: 0.06 K/UL (ref 0–0.44)
ABSOLUTE EOS #: 0.11 K/UL (ref 0–0.44)
ABSOLUTE IMMATURE GRANULOCYTE: 0.03 K/UL (ref 0–0.3)
ABSOLUTE IMMATURE GRANULOCYTE: 0.04 K/UL (ref 0–0.3)
ABSOLUTE LYMPH #: 1.27 K/UL (ref 1.1–3.7)
ABSOLUTE LYMPH #: 1.42 K/UL (ref 1.1–3.7)
ABSOLUTE MONO #: 0.74 K/UL (ref 0.1–1.2)
ABSOLUTE MONO #: 0.96 K/UL (ref 0.1–1.2)
ANION GAP SERPL CALCULATED.3IONS-SCNC: 11 MMOL/L (ref 9–17)
ANION GAP SERPL CALCULATED.3IONS-SCNC: 8 MMOL/L (ref 9–17)
BASOPHILS # BLD: 0 % (ref 0–2)
BASOPHILS # BLD: 1 % (ref 0–2)
BASOPHILS ABSOLUTE: 0.03 K/UL (ref 0–0.2)
BASOPHILS ABSOLUTE: 0.04 K/UL (ref 0–0.2)
BUN BLDV-MCNC: 13 MG/DL (ref 8–23)
BUN BLDV-MCNC: 9 MG/DL (ref 8–23)
BUN/CREAT BLD: 13 (ref 9–20)
BUN/CREAT BLD: 16 (ref 9–20)
CALCIUM SERPL-MCNC: 8.6 MG/DL (ref 8.6–10.4)
CALCIUM SERPL-MCNC: 8.8 MG/DL (ref 8.6–10.4)
CHLORIDE BLD-SCNC: 104 MMOL/L (ref 98–107)
CHLORIDE BLD-SCNC: 104 MMOL/L (ref 98–107)
CO2: 29 MMOL/L (ref 20–31)
CO2: 29 MMOL/L (ref 20–31)
CREAT SERPL-MCNC: 0.7 MG/DL (ref 0.7–1.2)
CREAT SERPL-MCNC: 0.82 MG/DL (ref 0.7–1.2)
DIFFERENTIAL TYPE: ABNORMAL
DIFFERENTIAL TYPE: ABNORMAL
EOSINOPHILS RELATIVE PERCENT: 1 % (ref 1–4)
EOSINOPHILS RELATIVE PERCENT: 2 % (ref 1–4)
GFR AFRICAN AMERICAN: >60 ML/MIN
GFR AFRICAN AMERICAN: >60 ML/MIN
GFR NON-AFRICAN AMERICAN: >60 ML/MIN
GFR NON-AFRICAN AMERICAN: >60 ML/MIN
GFR SERPL CREATININE-BSD FRML MDRD: ABNORMAL ML/MIN/{1.73_M2}
GFR SERPL CREATININE-BSD FRML MDRD: ABNORMAL ML/MIN/{1.73_M2}
GFR SERPL CREATININE-BSD FRML MDRD: NORMAL ML/MIN/{1.73_M2}
GFR SERPL CREATININE-BSD FRML MDRD: NORMAL ML/MIN/{1.73_M2}
GLUCOSE BLD-MCNC: 110 MG/DL (ref 70–99)
GLUCOSE BLD-MCNC: 84 MG/DL (ref 70–99)
HCT VFR BLD CALC: 39.1 % (ref 40.7–50.3)
HCT VFR BLD CALC: 39.4 % (ref 40.7–50.3)
HEMOGLOBIN: 12.7 G/DL (ref 13–17)
HEMOGLOBIN: 13.1 G/DL (ref 13–17)
IMMATURE GRANULOCYTES: 0 %
IMMATURE GRANULOCYTES: 1 %
INR BLD: 1
LYMPHOCYTES # BLD: 18 % (ref 24–43)
LYMPHOCYTES # BLD: 19 % (ref 24–43)
MCH RBC QN AUTO: 31.3 PG (ref 25.2–33.5)
MCH RBC QN AUTO: 32.1 PG (ref 25.2–33.5)
MCHC RBC AUTO-ENTMCNC: 32.5 G/DL (ref 28.4–34.8)
MCHC RBC AUTO-ENTMCNC: 33.2 G/DL (ref 28.4–34.8)
MCV RBC AUTO: 96.3 FL (ref 82.6–102.9)
MCV RBC AUTO: 96.6 FL (ref 82.6–102.9)
MONOCYTES # BLD: 11 % (ref 3–12)
MONOCYTES # BLD: 13 % (ref 3–12)
NRBC AUTOMATED: 0 PER 100 WBC
NRBC AUTOMATED: 0 PER 100 WBC
PDW BLD-RTO: 13.2 % (ref 11.8–14.4)
PDW BLD-RTO: 13.3 % (ref 11.8–14.4)
PLATELET # BLD: 250 K/UL (ref 138–453)
PLATELET # BLD: 266 K/UL (ref 138–453)
PLATELET ESTIMATE: ABNORMAL
PLATELET ESTIMATE: ABNORMAL
PMV BLD AUTO: 9.2 FL (ref 8.1–13.5)
PMV BLD AUTO: 9.3 FL (ref 8.1–13.5)
POTASSIUM SERPL-SCNC: 4.2 MMOL/L (ref 3.7–5.3)
POTASSIUM SERPL-SCNC: 4.2 MMOL/L (ref 3.7–5.3)
PROTHROMBIN TIME: 13.3 SEC (ref 11.5–14.2)
RBC # BLD: 4.06 M/UL (ref 4.21–5.77)
RBC # BLD: 4.08 M/UL (ref 4.21–5.77)
RBC # BLD: ABNORMAL 10*6/UL
RBC # BLD: ABNORMAL 10*6/UL
SEG NEUTROPHILS: 64 % (ref 36–65)
SEG NEUTROPHILS: 70 % (ref 36–65)
SEGMENTED NEUTROPHILS ABSOLUTE COUNT: 4.81 K/UL (ref 1.5–8.1)
SEGMENTED NEUTROPHILS ABSOLUTE COUNT: 4.91 K/UL (ref 1.5–8.1)
SODIUM BLD-SCNC: 141 MMOL/L (ref 135–144)
SODIUM BLD-SCNC: 144 MMOL/L (ref 135–144)
SURGICAL PATHOLOGY REPORT: NORMAL
WBC # BLD: 7 K/UL (ref 3.5–11.3)
WBC # BLD: 7.4 K/UL (ref 3.5–11.3)
WBC # BLD: ABNORMAL 10*3/UL
WBC # BLD: ABNORMAL 10*3/UL

## 2021-09-15 PROCEDURE — 85025 COMPLETE CBC W/AUTO DIFF WBC: CPT

## 2021-09-15 PROCEDURE — 85610 PROTHROMBIN TIME: CPT

## 2021-09-15 PROCEDURE — 97530 THERAPEUTIC ACTIVITIES: CPT

## 2021-09-15 PROCEDURE — 2060000000 HC ICU INTERMEDIATE R&B

## 2021-09-15 PROCEDURE — 71045 X-RAY EXAM CHEST 1 VIEW: CPT

## 2021-09-15 PROCEDURE — 97166 OT EVAL MOD COMPLEX 45 MIN: CPT

## 2021-09-15 PROCEDURE — 6370000000 HC RX 637 (ALT 250 FOR IP): Performed by: INTERNAL MEDICINE

## 2021-09-15 PROCEDURE — 2580000003 HC RX 258: Performed by: NURSE PRACTITIONER

## 2021-09-15 PROCEDURE — 36415 COLL VENOUS BLD VENIPUNCTURE: CPT

## 2021-09-15 PROCEDURE — 6360000002 HC RX W HCPCS: Performed by: INTERNAL MEDICINE

## 2021-09-15 PROCEDURE — 94640 AIRWAY INHALATION TREATMENT: CPT

## 2021-09-15 PROCEDURE — 6360000002 HC RX W HCPCS: Performed by: NURSE PRACTITIONER

## 2021-09-15 PROCEDURE — 97535 SELF CARE MNGMENT TRAINING: CPT

## 2021-09-15 PROCEDURE — 94761 N-INVAS EAR/PLS OXIMETRY MLT: CPT

## 2021-09-15 PROCEDURE — 80048 BASIC METABOLIC PNL TOTAL CA: CPT

## 2021-09-15 PROCEDURE — 97162 PT EVAL MOD COMPLEX 30 MIN: CPT

## 2021-09-15 PROCEDURE — 97116 GAIT TRAINING THERAPY: CPT

## 2021-09-15 PROCEDURE — 6370000000 HC RX 637 (ALT 250 FOR IP): Performed by: NURSE PRACTITIONER

## 2021-09-15 RX ORDER — AMOXICILLIN 250 MG
1 CAPSULE ORAL PRN
COMMUNITY

## 2021-09-15 RX ADMIN — ENOXAPARIN SODIUM 40 MG: 40 INJECTION SUBCUTANEOUS at 07:37

## 2021-09-15 RX ADMIN — GLYCOPYRROLATE AND FORMOTEROL FUMARATE 2 PUFF: 9; 4.8 AEROSOL, METERED RESPIRATORY (INHALATION) at 20:44

## 2021-09-15 RX ADMIN — CYCLOBENZAPRINE 10 MG: 10 TABLET, FILM COATED ORAL at 20:15

## 2021-09-15 RX ADMIN — GABAPENTIN 600 MG: 300 CAPSULE ORAL at 20:14

## 2021-09-15 RX ADMIN — TRAZODONE HYDROCHLORIDE 100 MG: 100 TABLET ORAL at 20:14

## 2021-09-15 RX ADMIN — HYDROMORPHONE HYDROCHLORIDE 0.5 MG: 1 INJECTION, SOLUTION INTRAMUSCULAR; INTRAVENOUS; SUBCUTANEOUS at 22:12

## 2021-09-15 RX ADMIN — SODIUM CHLORIDE, PRESERVATIVE FREE 10 ML: 5 INJECTION INTRAVENOUS at 20:14

## 2021-09-15 RX ADMIN — OXYCODONE AND ACETAMINOPHEN 1 TABLET: 10; 325 TABLET ORAL at 20:14

## 2021-09-15 RX ADMIN — GLYCOPYRROLATE AND FORMOTEROL FUMARATE 2 PUFF: 9; 4.8 AEROSOL, METERED RESPIRATORY (INHALATION) at 10:56

## 2021-09-15 RX ADMIN — HYDROMORPHONE HYDROCHLORIDE 0.5 MG: 1 INJECTION, SOLUTION INTRAMUSCULAR; INTRAVENOUS; SUBCUTANEOUS at 09:32

## 2021-09-15 RX ADMIN — GABAPENTIN 600 MG: 300 CAPSULE ORAL at 05:35

## 2021-09-15 RX ADMIN — HYDROMORPHONE HYDROCHLORIDE 0.5 MG: 1 INJECTION, SOLUTION INTRAMUSCULAR; INTRAVENOUS; SUBCUTANEOUS at 03:25

## 2021-09-15 RX ADMIN — CYCLOBENZAPRINE 10 MG: 10 TABLET, FILM COATED ORAL at 12:08

## 2021-09-15 RX ADMIN — SODIUM CHLORIDE, PRESERVATIVE FREE 10 ML: 5 INJECTION INTRAVENOUS at 10:00

## 2021-09-15 RX ADMIN — OXYCODONE AND ACETAMINOPHEN 1 TABLET: 10; 325 TABLET ORAL at 07:36

## 2021-09-15 RX ADMIN — HYDROMORPHONE HYDROCHLORIDE 0.5 MG: 1 INJECTION, SOLUTION INTRAMUSCULAR; INTRAVENOUS; SUBCUTANEOUS at 17:03

## 2021-09-15 RX ADMIN — OXYCODONE AND ACETAMINOPHEN 1 TABLET: 10; 325 TABLET ORAL at 14:23

## 2021-09-15 ASSESSMENT — PAIN DESCRIPTION - PAIN TYPE
TYPE: ACUTE PAIN;SURGICAL PAIN
TYPE: SURGICAL PAIN
TYPE: ACUTE PAIN;SURGICAL PAIN
TYPE: ACUTE PAIN;SURGICAL PAIN

## 2021-09-15 ASSESSMENT — PAIN DESCRIPTION - DESCRIPTORS
DESCRIPTORS: ACHING
DESCRIPTORS: ACHING

## 2021-09-15 ASSESSMENT — PAIN DESCRIPTION - PROGRESSION: CLINICAL_PROGRESSION: NOT CHANGED

## 2021-09-15 ASSESSMENT — PAIN SCALES - GENERAL
PAINLEVEL_OUTOF10: 6
PAINLEVEL_OUTOF10: 5
PAINLEVEL_OUTOF10: 8
PAINLEVEL_OUTOF10: 8
PAINLEVEL_OUTOF10: 6
PAINLEVEL_OUTOF10: 8
PAINLEVEL_OUTOF10: 7
PAINLEVEL_OUTOF10: 8
PAINLEVEL_OUTOF10: 6

## 2021-09-15 ASSESSMENT — PAIN DESCRIPTION - LOCATION
LOCATION: BACK
LOCATION: RIB CAGE
LOCATION: RIB CAGE;BACK
LOCATION: BACK

## 2021-09-15 ASSESSMENT — PAIN DESCRIPTION - ONSET
ONSET: ON-GOING
ONSET: ON-GOING

## 2021-09-15 ASSESSMENT — PAIN DESCRIPTION - FREQUENCY
FREQUENCY: CONTINUOUS
FREQUENCY: CONTINUOUS

## 2021-09-15 ASSESSMENT — PAIN DESCRIPTION - ORIENTATION
ORIENTATION: RIGHT

## 2021-09-15 ASSESSMENT — PAIN - FUNCTIONAL ASSESSMENT: PAIN_FUNCTIONAL_ASSESSMENT: ACTIVITIES ARE NOT PREVENTED

## 2021-09-15 NOTE — CONSULTS
Pulmonary Medicine and [address] [place] [address] APRN-CNP/[facility] Migel Chen MD      Patient - Sergei Bear   MRN -  [id number]   Bettie # - [de-identified]   - 1960      Date of Admission -  2021  3:48 PM  Date of evaluation -  9/15/2021  Room - [address] [place] [address] Day - 1  Pito Hernadez MD Primary Care Physician - Dylon Ware MD     Reason for Consult    Pneumothorax    Assessment   · Right pneumothorax  · Right upper lobe lung nodule s/p biopsy 2021  · COPD/active smoking, 70-pack-year history    Recommendations   · Oxygen via nasal cannula at 2 L secondary to pneumothorax  · Continue chest tube to suction. If pneumothorax is resolved on tomorrow morning's x-ray, will then clamp chest tube at that time and reevaluate with a repeat x-ray in 2 to 4 hours. · Continue Bevespi  · Pain control per primary team  · X-ray chest in am  · Labs: CBC and BMP in am  · DVT prophylaxis with low molecular weight heparin  · Discussed with RN  · Above assessment and plan will be reviewed with Dr. Migel Chen. Patient plan will be finalized following review by Dr. Migel Chen. · Will follow with you    Problem List      Patient Active Problem List   Diagnosis    Cervical spondylosis with myelopathy    Postprocedural pneumothorax    Pneumothorax       HPI     Sergei Bear is 57 y.o., male admitted yesterday for right pneumothorax status post CT-guided lung biopsy right upper lobe nodule. He has a 70-pack-year smoking history. Last smoked yesterday prior to his procedure. Home medications include Anoro. He does not use any oxygen at home. He does not use any nebulizer at home. He denies any shortness of breath at this time. He does not have any significant cough. He does have some discomfort at chest tube insertion site. His wife is at bedside.   This morning chest tube was clamped and on repeat x-ray his pneumothorax was worsening so chest tube is currently to suction at this time. There is a positive air leak in his chest tube system. PMHx   Past Medical History      Diagnosis Date    Arthralgia     Cervical myelopathy (HCC)     DDD (degenerative disc disease), lumbar     Lumbar disc herniation     Retrolisthesis     Spondylolisthesis of lumbar region       Past Surgical History        Procedure Laterality Date    BACK SURGERY  06/01/2018    CERVICAL FUSION N/A 5/24/2021    C 4-6 ACDF        HARDWARE REMOVAL        C6-7 PLATE - [employer]   NEURO MONITORING performed by Yojana Ovalle MD at [facility]  9/14/2021    CT GUIDED CHEST TUBE 9/14/2021 [facility] CT SCAN    CT NEEDLE BIOPSY LUNG PERCUTANEOUS  9/14/2021    CT NEEDLE BIOPSY LUNG PERCUTANEOUS 9/14/2021 [facility] CT SCAN    NECK SURGERY      cage and titanium disc       Meds    Current Medications    traZODone  100 mg Oral Nightly    glycopyrrolate-formoterol  2 puff Inhalation BID    sodium chloride flush  5-40 mL IntraVENous 2 times per day    enoxaparin  40 mg SubCUTAneous Daily    gabapentin  600 mg Oral TID     HYDROmorphone, cyclobenzaprine, sodium chloride flush, sodium chloride, potassium chloride **OR** potassium alternative oral replacement **OR** potassium chloride, magnesium sulfate, ondansetron **OR** ondansetron, polyethylene glycol, acetaminophen **OR** acetaminophen, oxyCODONE-acetaminophen  IV Drips/Infusions   sodium chloride       Home Medications  Medications Prior to Admission: oxyCODONE-acetaminophen (PERCOCET)  MG per tablet, Take 1 tablet by mouth every 4 hours as needed for Pain. ANORO ELLIPTA 62.5-25 MCG/INH AEPB inhaler, Inhale 1 puff into the lungs daily  cyclobenzaprine (FLEXERIL) 10 MG tablet, Take 1 tablet by mouth 3 times daily as needed for Muscle spasms  traZODone (DESYREL) 100 MG tablet, Take 100 mg by mouth nightly  Gabapentin, Once-Daily, (GRALISE) 600 MG TABS, Take 1,800 mg by mouth nightly for 30 days.     Allergies    Patient has no known allergies. Social History     Social History     Tobacco Use    Smoking status: Current Every Day Smoker     Packs/day: 2.00     Years: 35.00     Pack years: 70.00     Types: Cigarettes    Smokeless tobacco: Never Used   Substance Use Topics    Alcohol use: Yes     Comment: social     Family History          Problem Relation Age of Onset    Asthma Mother     Other Father     Cancer Brother      ROS - 6 systems   General Denies any fever or chills  HEENT Denies any diplopia, tinnitus or vertigo  Resp positive for discomfort at chest tube insertion site  Cardiac Denies any chest pain, palpitations, claudication or edema  GI Denies any melena, hematochezia, hematemesis or pyrosis   Denies any frequency, urgency, hesitancy or incontinence  Heme Denies bruising or bleeding easily  Endocrine Denies any history of diabetes or thyroid disease  Neuro Denies any focal motor or sensory deficits  Psychiatric Denies anxiety, depression, suicidal ideation  Skin Denies rashes, itching, open sores    Vitals     height is 5' 8\" (1.727 m) and weight is 142 lb 11.2 oz (64.7 kg). His oral temperature is 97.7 °F (36.5 °C). His blood pressure is 114/68 and his pulse is 79. His respiration is 16 and oxygen saturation is 96%. Body mass index is 21.7 kg/m². I/O    No intake or output data in the 24 hours ending 09/15/21 1155  No intake/output data recorded. Patient Vitals for the past 96 hrs (Last 3 readings):   Weight   09/15/21 0711 142 lb 11.2 oz (64.7 kg)   09/14/21 1600 143 lb (64.9 kg)     Exam   General Appearance  Awake, alert, oriented, in no acute distress  HEENT - Head is normocephalic, atraumatic. Pupil reactive to light  Neck - Supple,  trachea midline and straight  Lungs -air exchange, no crackles or wheezing. Positive air leak in chest tube  Cardiovascular - Heart sounds are normal.  Regular rhythm normal rate without murmur, gallop or rub.   Abdomen - Soft, nontender, nondistended, no masses or organomegaly  Neurologic - CN II-XII are grossly intact. There are no focal motor or sensory deficits  Skin - No bruising or bleeding  Extremities - No cyanosis, clubbing or edema    Labs  - Old records and notes have been reviewed in [facility]   CBC     Lab Results   Component Value Date    WBC 7.4 09/15/2021    RBC 4.08 09/15/2021    HGB 13.1 09/15/2021    HCT 39.4 09/15/2021     09/15/2021    MCV 96.6 09/15/2021    MCH 32.1 09/15/2021    MCHC 33.2 09/15/2021    RDW 13.2 09/15/2021    LYMPHOPCT 19 09/15/2021    MONOPCT 13 09/15/2021    BASOPCT 1 09/15/2021    MONOSABS 0.96 09/15/2021    LYMPHSABS 1.42 09/15/2021    EOSABS 0.11 09/15/2021    BASOSABS 0.04 09/15/2021    DIFFTYPE NOT REPORTED 09/15/2021     BMP   Lab Results   Component Value Date     09/15/2021    K 4.2 09/15/2021     09/15/2021    CO2 29 09/15/2021    BUN 9 09/15/2021    CREATININE 0.70 09/15/2021    GLUCOSE 110 09/15/2021    CALCIUM 8.6 09/15/2021     PTT  Lab Results   Component Value Date    APTT 26.1 09/14/2021     INR   Lab Results   Component Value Date    INR 1.0 09/15/2021    INR 0.9 09/14/2021    PROTIME 13.3 09/15/2021    PROTIME 12.4 09/14/2021       Radiology    CXR       (See actual reports for details)    \"Thank you for asking us to see this patient\"    Case discussed with nurse and patient/family. Questions and concerns addressed.     Electronically signed by     ADELAIDE Douglass-CNP  Pulmonary Critical Care and Sleep Medicine,  Patient seen under the supervision of Michela Koo MD, [facility]

## 2021-09-15 NOTE — PROGRESS NOTES
Occupational Therapy   Occupational Therapy Initial Assessment  Date: 9/15/2021   Patient Name: Sergei Bear  MRN: [id number]     : 1960    RN Prashant Adame reports patient is medically stable for therapy treatment this date. Chart reviewed prior to treatment and patient is agreeable for therapy. All lines intact and patient positioned comfortably at end of treatment. All patient needs addressed prior to ending therapy session. Date of Service: 9/15/2021    Discharge Recommendations:  Patient would benefit from continued therapy at this setting and after discharge(OP PT setting as pt was previously completing)  OT Equipment Recommendations  Equipment Needed: Yes  Mobility Devices: ADL Assistive Devices  ADL Assistive Devices: Grab Bars - shower; Toileting - 3-in-1 Commode;Reacher;Long-handled Shoe [place];Long-handled Sponge;Emergency Alert System    Assessment   Performance deficits / Impairments: Decreased functional mobility ; Decreased safe awareness;Decreased balance;Decreased ADL status; Decreased endurance;Decreased high-level IADLs;Decreased coordination  Assessment: Skilled OT is recommended for this pt to improve I and safety in areas of self care and function to return home with family assist as needed. Prognosis: Fair  Decision Making: Medium Complexity  OT Education: OT Role;Plan of Care;Energy Conservation;Transfer Training  Patient Education: pursed lip breathing, safety in function, benefits of being up OOB as able and recommendations for continued therapy  REQUIRES OT FOLLOW UP: Yes  Activity Tolerance  Activity Tolerance: Patient limited by fatigue;Patient limited by pain  Activity Tolerance: fair minus/fair  Safety Devices  Safety Devices in place: Yes  Type of devices: Call light within reach; Chair alarm in place; Left in chair;Patient at risk for falls;Gait belt;Nurse notified           Patient Diagnosis(es): There were no encounter diagnoses.      has a past medical history of Arthralgia, Cervical myelopathy (HCC), DDD (degenerative disc disease), lumbar, Lumbar disc herniation, Retrolisthesis, and Spondylolisthesis of lumbar region. has a past surgical history that includes Cholecystectomy; back surgery (06/01/2018); Neck surgery; cervical fusion (N/A, 5/24/2021); CT NEEDLE BIOPSY LUNG PERCUTANEOUS (9/14/2021); and CT GUIDED CHEST TUBE (9/14/2021). PER H&P: Present Illness: 65-year-old gentleman with known history of COPD and chronic smoker who was found to have lung nodule was undergoing CT-guided biopsy had moderate left-sided pneumothorax chest tube was put in and patient admitted to the progressive area on the monitor patient doing fairly well denies any chest pain no shortness of breath no fever no chills has been coughing with yellow phlegm stuffy nose with postnasal drip. Drainage times few days           Restrictions  Restrictions/Precautions  Restrictions/Precautions: General Precautions, Fall Risk  Position Activity Restriction  Other position/activity restrictions: Up with assist, chest tube on R, RUE IV, alarms    Subjective   General  Chart Reviewed: Yes  Patient assessed for rehabilitation services?: Yes  Family / Caregiver Present: No  Patient Currently in Pain: Yes  Pre Treatment Pain Screening  Comments / Details: Pt states he took recent pain meds for chest tube incision pain and he rates 6-7/10.     Social/Functional History  Social/Functional History  Lives With: Spouse (pt states spouse is supportive, works dayshift)  Type of Home: [address] [place] [address],[address]: Two level, Bed/Bath upstairs (full flight with partial rail on left ascending, can bathe in basement but has flight of steps down, no rails)  Home Access: Stairs to enter with rails  Entrance Stairs - Number of Steps: 4  Entrance Stairs - Rails: Both  Bathroom Shower/Tub: Tub/Shower unit  Bathroom Toilet: Standard  Bathroom Equipment:  (none)  Home Equipment: Rolling walker  ADL Assistance: Independent  Homemaking Assistance: Needs assistance (pt states he shares duties with spouse cook/cleaing however spouse does all laundry)  Homemaking Responsibilities: Yes  Ambulation Assistance: Independent  Transfer Assistance: Independent  Active : No  Patient's  Info: Spouse  Mode of Transportation: Car  Occupation: On disability  Type of occupation: delivered DME  Leisure & Hobbies: bass fishing  Additional Comments: Pt states he has had problems with R knee buckling since May & had near falls. Pt was seeing OP PT with visit last week at [facility]. Objective   Vision: Impaired  Vision Exceptions: Wears glasses for reading;Wears glasses for distance (pt has prescription for distance, denies vision changes)  Hearing: Within functional limits    Orientation  Overall Orientation Status: Within Functional Limits  Observation/Palpation  Posture: Good  Observation: pt has raspy voice(chronic smoker), R sided chest tube  Edema: none  Scar: previous neck and nack incisions noted  Balance  Sitting Balance: Stand by assistance  Standing Balance: Minimal assistance  Standing Balance  Time: stand jasno approx 5 mins  Functional Mobility  Functional - Mobility Device: No device (pt was edu on recommendation for RW use when up to increase safety/reduce falls and pt declined and states, \"This is just the way I walk and I am not using a walker! \")  Activity:  (bed to toilet, toilet to sink, sink back to bedside chair)  Assist Level: Minimal assistance  Functional Mobility Comments: MOD verbal instruction/tactile assist for pacing, pursed lip breathing, weight shifting, scanning, recommendations for use of RW when up(pt declined to use) and awareness/assist with all lines to increase overall safety/reduce falls.   Toilet Transfers  Toilet - Technique: Ambulating  Equipment Used: Grab bars  Toilet Transfer: Contact guard assistance (to stand at toilet/grab bar to urinate)  Toilet Transfers Comments: MIN cues for hand placement on grab bar and awareness/assist with lines to increase safety. ADL  Feeding: Setup  Grooming: Setup;Contact guard assistance (to stand at sink and wash B hands after toileting)  UE Bathing: Setup;Contact guard assistance  LE Bathing: Setup;Contact guard assistance  UE Dressing: Setup;Minimal assistance (with hosp gown)  LE Dressing: Setup;Contact guard assistance (pt was able to lety B socks seated in chair with set up/SBA)  Toileting: Setup;Minimal assistance (with gown mgt assist while pt was standing to urinate at grab bar)  Tone RUE  RUE Tone: Normotonic  Tone LUE  LUE Tone: Normotonic  Coordination  Movements Are Fluid And Coordinated: No  Coordination and Movement description: Fine motor impairments     Bed mobility  Supine to Sit: Minimal assistance  Sit to Supine: Unable to assess (pt agreed to sit up in chair after edu on the benefits of being up OOB as able)  Comment: MIN cues needed for pursed lip breathing and awareness/assist with chest tube/lines and pt with good use of rail as needed. Transfers  Stand Step Transfers: Minimal assistance (no AD however recommend RW when up to increase overall safety/reduce falls; pt declined AD use)  Sit to stand: Minimal assistance  Stand to sit: Minimal assistance  Transfer Comments: MOD cues for pacing, pursed lip breathing, scanning, hand placement reaching back to surace and awareness/assist with lines to increase overall safety. Cognition  Overall Cognitive Status: Exceptions  Arousal/Alertness: Appropriate responses to stimuli  Following Commands:  Follows all commands without difficulty  Attention Span: Appears intact  Memory: Appears intact  Safety Judgement: Decreased awareness of need for safety;Decreased awareness of need for assistance  Problem Solving: Assistance required to correct errors made;Assistance required to identify errors made;Decreased awareness of errors  Insights: Decreased awareness of deficits  Initiation: Requires cues for some  Sequencing: Does not require cues  Perception  Overall Perceptual Status: WFL     Sensation  Overall Sensation Status: Impaired (Pt states he has constant paresthesias in his B hands, pt states his RLE flaco and just feels off)        LUE AROM (degrees)  LUE AROM : WFL  RUE AROM (degrees)  RUE AROM : WFL  LUE Strength  Gross LUE Strength: WFL  LUE Strength Comment: BUE strength grossly 4 plus/5  RUE Strength  Gross RUE Strength: WFL                   Plan   Plan  Times per week: 4-5x/week 1x/day as jason  Current Treatment Recommendations: Endurance Training, Strengthening, Neuromuscular Re-education, Equipment Evaluation, Education, & procurement, Self-Care / ADL, Pain Management, Balance Training, Functional Mobility Training, Safety Education & Training, Positioning, Home Management Training, Patient/Caregiver Education & Training                                                  AM-PAC Score      19       Goals  Short term goals  Time Frame for Short term goals: by discharge, pt to demo  Short term goal 1: bed mob tasks with use of rail as needed to MOD I. Short term goal 2: I with BUE HEP with use of handouts as needed to maintain strength. Short term goal 3: total body ADL tasks with use of AE/AD and grab bar as needed to SUP. Short term goal 4: ADL transfers and functional mob with AD as needed to SUP. Short term goal 5: standing to SBA and AD as needed jason > 8 mins as able to reduce falls in function. Long term goals  Long term goal 1: Pt/caregivers to be I with EC/WS and fall prevention tech and DME/AE and AD recommendations with handouts. Patient Goals   Patient goals : Get out of here!        Therapy Time   Individual Concurrent Group Co-treatment   Time In 9088 (plus 10 min chart review/nursing communication)         Time Out 0933         Minutes 46         Timed Code Treatment Minutes: [address] [place], [place]

## 2021-09-15 NOTE — PROGRESS NOTES
Dr. Elizabeth Muller reviewed chest xray after chest tube was clamped, orders for chest tube to be hooked back to suction received, chest tube will not be removed at this time.

## 2021-09-15 NOTE — PLAN OF CARE
Problem: Falls - Risk of:  Goal: Will remain free from falls  Description: Will remain free from falls  9/15/2021 1255 by Hugh Tejada RN  Outcome: Ongoing  Note: Patient is fall risk per fall scale. Hourly rounding performed. Personal belongings and call light within reach. Bed in low position.      9/15/2021 0028 by Remigio Lane RN  Outcome: Ongoing  Goal: Absence of physical injury  Description: Absence of physical injury  9/15/2021 1255 by Hugh Tejada RN  Outcome: Ongoing  9/15/2021 0028 by Remigio Lane RN  Outcome: Ongoing     Problem: Pain:  Goal: Pain level will decrease  Description: Pain level will decrease  9/15/2021 1255 by Hugh Tejada RN  Outcome: Ongoing  9/15/2021 0028 by Remigio Lane RN  Outcome: Ongoing  Goal: Control of acute pain  Description: Control of acute pain  9/15/2021 0029 by Remigio Lane RN  Outcome: Ongoing  9/15/2021 0028 by Remigio Lane RN  Outcome: Ongoing  Goal: Control of chronic pain  Description: Control of chronic pain  9/15/2021 0029 by Remigio Lane RN  Outcome: Ongoing  9/15/2021 0028 by Remigio Lane RN  Outcome: Ongoing

## 2021-09-15 NOTE — PROGRESS NOTES
Physical Therapy    Facility/Department: [facility]  Initial Assessment    NAME: Polo Siu  : 1960  MRN: [id number]    Date of Service: 9/15/2021    Discharge Recommendations:  Patient would benefit from continued therapy after discharge     Pt presented to surgery on 21 for lung biopsy, developed moderate left-sided pneumothorax chest tube was put in and patient admitted for management. RN reports patient is medically stable for therapy treatment this date. Chart reviewed prior to treatment and patient is agreeable for therapy. Assessment   Body structures, Functions, Activity limitations: Decreased functional mobility ; Decreased ADL status; Decreased strength;Decreased safe awareness;Decreased endurance;Decreased balance  Assessment: Pt with minimal deficits noted in bed mobility, transfers, ambulation, balance, and endurance this session. Pt requires continued PT to maximize independence with functional mobility, balance, safety awareness & activity tolerance. Pt would benefit from additional therapy at time of discharge. Please refer to the AM-PAC score for current functional status. Decision Making: Medium Complexity  Exam: ROM, MMT, functional mobility, activity tolerance, Balance, & [employer] AM-PAC 6 Clicks Basic Mobility  Clinical Presentation: evolving  PT Education: Goals;PT Role;Plan of Care;Transfer Training;Functional Mobility Training;Gait Training;Energy Conservation  Patient Education: Ed pt on functional mobility, safety awareness, importance of being up & OOB to regain strength, & prevention of sedentary complications, circulation ex's,  & optimal breathing techniques  REQUIRES PT FOLLOW UP: Yes  Activity Tolerance  Activity Tolerance: Patient limited by endurance       Patient Diagnosis(es): There were no encounter diagnoses.      has a past medical history of Arthralgia, Cervical myelopathy ([address] DDD (degenerative disc disease), lumbar, Lumbar disc herniation, Retrolisthesis, and Spondylolisthesis of lumbar region. has a past surgical history that includes Cholecystectomy; back surgery (06/01/2018); Neck surgery; cervical fusion (N/A, 5/24/2021); CT NEEDLE BIOPSY LUNG PERCUTANEOUS (9/14/2021); and CT GUIDED CHEST TUBE (9/14/2021). Restrictions  Restrictions/Precautions  Restrictions/Precautions: General Precautions, Fall Risk  Position Activity Restriction  Other position/activity restrictions:  Up with assist, chest tube on R, RUE IV, alarms  Vision/Hearing  Vision: Impaired  Vision Exceptions: Wears glasses for reading (pt has prescription for distance, denies vision changes)  Hearing: Within functional limits     Subjective  General  Chart Reviewed: Yes  Patient assessed for rehabilitation services?: Yes  Additional Pertinent Hx: COPD, chronic smoker, cervical spondylosis with myelopathy  Response To Previous Treatment: Not applicable  General Comment  Comments: RN okays PT  Subjective  Subjective: Pt agreeable to PT  Pain Screening  Patient Currently in Pain: Yes  Pain Assessment  Pain Assessment: 0-10  Pain Level:  (6/10)  Pain Type: Surgical pain  Pain Location: Back  Vital Signs  Patient Currently in Pain: Yes       Orientation  Orientation  Overall Orientation Status: Within Functional Limits  Social/Functional History  Social/Functional History  Lives With: Spouse (spouse is supportive, works dayshift)  Type of Home: [employer] Layout: Two level, Bed/Bath upstairs (full flight with partial rail on left ascending, can bathe in basement but has flight of steps down, no rails)  Home Access: Stairs to enter with rails  Entrance Stairs - Number of Steps: 4  Entrance Stairs - Rails: Both  Bathroom Shower/Tub: Tub/Shower unit  Bathroom Toilet: Standard  Bathroom Equipment:  (none)  Home Equipment: Rolling walker  ADL Assistance: Independent  Homemaking Assistance: Needs assistance (he shares duties with spouse cook/clean & spouse does laundry)  Homemaking Responsibilities: Yes  Ambulation Assistance: Independent  Transfer Assistance: Independent  Active : No  Patient's  Info: Spouse  Mode of Transportation: Car  Occupation: On disability  Type of occupation: delivered DME  Leisure & Hobbies: bass fishing  Additional Comments: Pt has had problems with R knee buckling since May & had near falls. Pt was seeing PT with most recent visit last week at [facility]. Cognition   Cognition  Overall Cognitive Status: Exceptions  Arousal/Alertness: Appropriate responses to stimuli  Following Commands:  Follows all commands without difficulty  Attention Span: Appears intact  Memory: Appears intact  Safety Judgement: Decreased awareness of need for safety;Decreased awareness of need for assistance  Problem Solving: Assistance required to correct errors made;Assistance required to identify errors made;Decreased awareness of errors  Insights: Decreased awareness of deficits  Initiation: Requires cues for some  Sequencing: Does not require cues    Objective     Observation/Palpation  Posture: Good  Observation: pt has raspy voice(chronic smoker), R sided chest tube  Edema: none  Scar: previous neck and nack incisions noted    AROM RLE (degrees)  RLE AROM: WFL  AROM LLE (degrees)  LLE AROM : WFL  AROM RUE (degrees)  RUE General AROM: See OT assessment  AROM LUE (degrees)  LUE General AROM: See OT assessment  Strength RLE  Comment: 4-/5 R hip, knee  Strength LLE  Strength LLE: WFL  Strength RUE  Comment: see OT assessment  Strength LUE  Comment: see OT assessment  Tone RLE  RLE Tone: Normotonic  Tone LLE  LLE Tone: Normotonic  Motor Control  Gross Motor?: WFL  Sensation  Overall Sensation Status: Impaired (Pt has constant paresthesias in his hands, RLE flaco)  Bed mobility  Supine to Sit: Minimal assistance  Sit to Supine: Unable to assess (pt agreed to sit up in chair after edu on the benefits of being up OOB as able)  Comment: MIN cues needed for Goals  Short term goals  Time Frame for Short term goals: 12 visits  Short term goal 1: Inc bed-mobility & transfers to independent to enable pt to safely get in/OOB & chair  Short term goal 2: Inc gait to amb 350ft or > indep to enable pt to return to previous level of independence  Short term goal 3: Pt able to go up/down 6 steps with one rail indep  Short term goal 4:  Inc standing balance to good with device to facilitate pt independence for performance of ADL's & functional mobility, & reduce fall risk  Short term goal 5: Ed pt on home ex's, safety & energy principles, fall prevention, & issue written home program       Therapy Time   Individual Concurrent Group Co-treatment   Time In 0848         Time Out 0933         Minutes 45+10=55              Additional 10 minutes for chart review          [address], PT

## 2021-09-15 NOTE — PROGRESS NOTES
Transitions of Care Pharmacy Service   Medication Review    The patient's list of current home medications has been reviewed. Source(s) of information: Janice, Patient     Based on information provided by the above source(s), I have updated the patient's home med list as described below. Please review the ACTION REQUESTED section of this note for any discrepancies on current hospital orders. I changed or updated the following medications on the patient's home medication list:  Added Stimulant laxative plus tab 8.5-60 MG PRN          PROVIDER ACTION REQUESTED  Medications that need to be addressed by a physician/nurse practitioner:    Medication Action Requested     None     None         Please feel free to call me with any questions about this encounter. Thank you. This note will be reviewed and co-signed by the [facility] Pharmacist.    Torri SavageD student  Transitions [facility] Pharmacy Service  Phone:  [phone]  Fax: [phone]      Electronically signed by Ernie Noguera on 9/15/2021 at 12:40 PM       Prior to Admission medications    Medication Sig Start Date End Date Taking? Authorizing Provider   senna-docusate (STIMULANT LAXATIVE) 8.6-50 MG per tablet Take 1 tablet by mouth as needed for Constipation   Yes Historical Provider, MD   oxyCODONE-acetaminophen (PERCOCET)  MG per tablet Take 1 tablet by mouth every 4 hours as needed for Pain. Yes Historical Provider, MD Stricklandli Seek 62.5-25 MCG/INH AEPB inhaler Inhale 1 puff into the lungs daily 9/3/21  Yes Historical Provider, MD   cyclobenzaprine (FLEXERIL) 10 MG tablet Take 1 tablet by mouth 3 times daily as needed for Muscle spasms 5/25/21  Yes Parrish Quiñonez MD   traZODone (DESYREL) 100 MG tablet Take 100 mg by mouth nightly   Yes Historical Provider, MD   Gabapentin, Once-Daily, (GRALISE) 600 MG TABS Take 1,800 mg by mouth nightly for 30 days.  5/25/21 9/14/21  Parrish Quiñonez MD

## 2021-09-15 NOTE — PLAN OF CARE
Problem: Pain:  Goal: Control of acute pain  Description: Control of acute pain  9/15/2021 0028 by Ernesto Shelley RN  Outcome: Ongoing     Problem: Pain:  Goal: Control of chronic pain  Description: Control of chronic pain  9/15/2021 0029 by Ernesto Shelley RN  Outcome: Ongoing

## 2021-09-15 NOTE — CARE COORDINATION
Case Management Initial Discharge Plan  Keesha Menendez             Met with:patient or spouse/SO to discuss discharge plans. Information verified: address, contacts, phone number, , insurance Yes  PCP: Adam Ziegler MD  Date of last visit: 2021    Insurance Provider: [address]    Discharge Planning    Living Arrangements:  Spouse/Significant Other   Support Systems:  Spouse/Significant Other    Home has 2 stories  4-5 stairs to climb to get into front door, 13 stairs to climb to reach second floor  Location of bedroom/bathroom in home 2nd Floor    Patient able to perform ADL's:Independent    Current Services (outpatient & in home) None  DME equipment: Recombine  DME provider: N/A    [facility]: [facility]. Potential Assistance Needed:  Outpatient PT/OT    Patient agreeable to home care: No  [place] of choice provided:  n/a    Prior SNF/Rehab Placement and Facility: No  Agreeable to SNF/Rehab: No  [place] of choice provided: n/a   Evaluation: n/a    Expected Discharge date:  21  Patient expects to be discharged to: Follow Up Appointment: Best Day/ Time:      Transportation provider: Spouse  Transportation arrangements needed for discharge: No    Readmission Risk              Risk of Unplanned Readmission:  7             Does patient have a readmission risk score greater than 14?: No  If yes, follow-up appointment must be made within 7 days of discharge. Goal of Care:       Discharge Plan:   Met with patient and spouse at bedside to discuss d/c plans. Patient is admitted with post-procedural pneumothorax. Patient had a right lung biopsy and developed a large pneumothorax and a right chest tube was placed. Chest tube is to suction at this time. Independent and Drives. Lives with spouse. Denies any HC/SNF/DME needs at d/c. Consults: Pulm    Continue to follow.             Electronically signed by Mery Grady RN on 9/15/21 at 12:36 PM EDT

## 2021-09-15 NOTE — PROGRESS NOTES
Subjective:    COPD  No shortness of breath no tachypnea no wheezing occasional cough nonproductive ROS  Fever no chills no GI/ complaints no cardiac complaints complaints of pain chest tube site, no TIA no bleeding no headache no sore throat no skin lesions no polyuria no polydipsia no hypoglycemia  physical exam  General Appearance: in no acute distress and alert  Skin: warm and dry, no rash or erythema  Head: normocephalic and atraumatic  Eyes: pupils equal, round, and reactive to light, conjunctivae normal and sclera anicteric    Neck: neck supple and non tender without mass   Pulmonary/Chest: clear to auscultation bilaterally- no wheezes, rales or rhonchi, normal air movement, no respiratory distress prolonged expiratory phase  Cardiovascular: normal rate, regular rhythm, normal S1 and S2, no gallops, intact distal pulses and no carotid bruits  Abdomen: soft, non-tender, non-distended, normal bowel sounds, no masses or organomegaly  Extremities: no edema and good pulses no Homans' sign    Neurologic: Alert oriented x3 with no focal    BP (!) 102/59   Pulse 76   Temp 97.9 °F (36.6 °C) (Oral)   Resp 18   Ht 5' 8\" (1.727 m)   Wt 142 lb 11.2 oz (64.7 kg)   SpO2 97%   BMI 21.70 kg/m²     CBC:   Lab Results   Component Value Date    WBC 7.4 09/15/2021    RBC 4.08 09/15/2021    HGB 13.1 09/15/2021    HCT 39.4 09/15/2021    MCV 96.6 09/15/2021    MCH 32.1 09/15/2021    MCHC 33.2 09/15/2021    RDW 13.2 09/15/2021     09/15/2021    MPV 9.3 09/15/2021     BMP:    Lab Results   Component Value Date     09/15/2021    K 4.2 09/15/2021     09/15/2021    CO2 29 09/15/2021    BUN 9 09/15/2021    CREATININE 0.70 09/15/2021    CALCIUM 8.6 09/15/2021    GFRAA >60 09/15/2021    LABGLOM >60 09/15/2021    GLUCOSE 110 09/15/2021        Assessment:  Patient Active Problem List   Diagnosis    Cervical spondylosis with myelopathy    Postprocedural pneumothorax    Pneumothorax     Right-sided pneumothorax postprocedure  Lung nodule  COPD  Chronic smoker  Chronic low back pain  Subcutaneous emphysema  Plan:    Meds labs reviewed continue with DVT prophylaxis smoking cessation advised continue with inhalers chest tube PT OT already on pain control medications and started on Dilaudid his pain is better see orders      Lamine Kingsley MD MD  5:46 PM

## 2021-09-16 ENCOUNTER — APPOINTMENT (OUTPATIENT)
Dept: GENERAL RADIOLOGY | Age: 61
DRG: 200 | End: 2021-09-16
Attending: INTERNAL MEDICINE
Payer: COMMERCIAL

## 2021-09-16 LAB
ABSOLUTE EOS #: 0.12 K/UL (ref 0–0.44)
ABSOLUTE IMMATURE GRANULOCYTE: 0.04 K/UL (ref 0–0.3)
ABSOLUTE LYMPH #: 1.28 K/UL (ref 1.1–3.7)
ABSOLUTE MONO #: 0.86 K/UL (ref 0.1–1.2)
ANION GAP SERPL CALCULATED.3IONS-SCNC: 5 MMOL/L (ref 9–17)
BASOPHILS # BLD: 1 % (ref 0–2)
BASOPHILS ABSOLUTE: 0.05 K/UL (ref 0–0.2)
BUN BLDV-MCNC: 12 MG/DL (ref 8–23)
BUN/CREAT BLD: 16 (ref 9–20)
CALCIUM SERPL-MCNC: 8.5 MG/DL (ref 8.6–10.4)
CHLORIDE BLD-SCNC: 104 MMOL/L (ref 98–107)
CO2: 30 MMOL/L (ref 20–31)
CREAT SERPL-MCNC: 0.77 MG/DL (ref 0.7–1.2)
DIFFERENTIAL TYPE: ABNORMAL
EOSINOPHILS RELATIVE PERCENT: 2 % (ref 1–4)
FERRITIN: 74 UG/L (ref 30–400)
GFR AFRICAN AMERICAN: >60 ML/MIN
GFR NON-AFRICAN AMERICAN: >60 ML/MIN
GFR SERPL CREATININE-BSD FRML MDRD: ABNORMAL ML/MIN/{1.73_M2}
GFR SERPL CREATININE-BSD FRML MDRD: ABNORMAL ML/MIN/{1.73_M2}
GLUCOSE BLD-MCNC: 111 MG/DL (ref 70–99)
HCT VFR BLD CALC: 37.9 % (ref 40.7–50.3)
HEMOGLOBIN: 12.4 G/DL (ref 13–17)
IMMATURE GRANULOCYTES: 1 %
LYMPHOCYTES # BLD: 18 % (ref 24–43)
MCH RBC QN AUTO: 31.6 PG (ref 25.2–33.5)
MCHC RBC AUTO-ENTMCNC: 32.7 G/DL (ref 28.4–34.8)
MCV RBC AUTO: 96.4 FL (ref 82.6–102.9)
MONOCYTES # BLD: 12 % (ref 3–12)
NRBC AUTOMATED: 0 PER 100 WBC
PDW BLD-RTO: 13.2 % (ref 11.8–14.4)
PLATELET # BLD: 260 K/UL (ref 138–453)
PLATELET ESTIMATE: ABNORMAL
PMV BLD AUTO: 9.4 FL (ref 8.1–13.5)
POTASSIUM SERPL-SCNC: 4.7 MMOL/L (ref 3.7–5.3)
RBC # BLD: 3.93 M/UL (ref 4.21–5.77)
RBC # BLD: ABNORMAL 10*6/UL
SEG NEUTROPHILS: 66 % (ref 36–65)
SEGMENTED NEUTROPHILS ABSOLUTE COUNT: 4.92 K/UL (ref 1.5–8.1)
SODIUM BLD-SCNC: 139 MMOL/L (ref 135–144)
WBC # BLD: 7.3 K/UL (ref 3.5–11.3)
WBC # BLD: ABNORMAL 10*3/UL

## 2021-09-16 PROCEDURE — 85025 COMPLETE CBC W/AUTO DIFF WBC: CPT

## 2021-09-16 PROCEDURE — 83550 IRON BINDING TEST: CPT

## 2021-09-16 PROCEDURE — 36415 COLL VENOUS BLD VENIPUNCTURE: CPT

## 2021-09-16 PROCEDURE — 6370000000 HC RX 637 (ALT 250 FOR IP): Performed by: NURSE PRACTITIONER

## 2021-09-16 PROCEDURE — 82607 VITAMIN B-12: CPT

## 2021-09-16 PROCEDURE — 6370000000 HC RX 637 (ALT 250 FOR IP): Performed by: INTERNAL MEDICINE

## 2021-09-16 PROCEDURE — 82728 ASSAY OF FERRITIN: CPT

## 2021-09-16 PROCEDURE — 94640 AIRWAY INHALATION TREATMENT: CPT

## 2021-09-16 PROCEDURE — 6360000002 HC RX W HCPCS: Performed by: NURSE PRACTITIONER

## 2021-09-16 PROCEDURE — 6360000002 HC RX W HCPCS: Performed by: INTERNAL MEDICINE

## 2021-09-16 PROCEDURE — 83540 ASSAY OF IRON: CPT

## 2021-09-16 PROCEDURE — 82746 ASSAY OF FOLIC ACID SERUM: CPT

## 2021-09-16 PROCEDURE — 97535 SELF CARE MNGMENT TRAINING: CPT | Performed by: NURSE PRACTITIONER

## 2021-09-16 PROCEDURE — 2580000003 HC RX 258: Performed by: NURSE PRACTITIONER

## 2021-09-16 PROCEDURE — 71045 X-RAY EXAM CHEST 1 VIEW: CPT

## 2021-09-16 PROCEDURE — 80048 BASIC METABOLIC PNL TOTAL CA: CPT

## 2021-09-16 PROCEDURE — 97530 THERAPEUTIC ACTIVITIES: CPT | Performed by: NURSE PRACTITIONER

## 2021-09-16 PROCEDURE — 2060000000 HC ICU INTERMEDIATE R&B

## 2021-09-16 RX ADMIN — OXYCODONE AND ACETAMINOPHEN 1 TABLET: 10; 325 TABLET ORAL at 01:28

## 2021-09-16 RX ADMIN — SODIUM CHLORIDE, PRESERVATIVE FREE 10 ML: 5 INJECTION INTRAVENOUS at 08:02

## 2021-09-16 RX ADMIN — GABAPENTIN 600 MG: 300 CAPSULE ORAL at 20:30

## 2021-09-16 RX ADMIN — GABAPENTIN 600 MG: 300 CAPSULE ORAL at 06:01

## 2021-09-16 RX ADMIN — CYCLOBENZAPRINE 10 MG: 10 TABLET, FILM COATED ORAL at 20:33

## 2021-09-16 RX ADMIN — SODIUM CHLORIDE, PRESERVATIVE FREE 10 ML: 5 INJECTION INTRAVENOUS at 20:33

## 2021-09-16 RX ADMIN — GLYCOPYRROLATE AND FORMOTEROL FUMARATE 2 PUFF: 9; 4.8 AEROSOL, METERED RESPIRATORY (INHALATION) at 18:43

## 2021-09-16 RX ADMIN — HYDROMORPHONE HYDROCHLORIDE 0.5 MG: 1 INJECTION, SOLUTION INTRAMUSCULAR; INTRAVENOUS; SUBCUTANEOUS at 03:21

## 2021-09-16 RX ADMIN — OXYCODONE AND ACETAMINOPHEN 1 TABLET: 10; 325 TABLET ORAL at 08:02

## 2021-09-16 RX ADMIN — HYDROMORPHONE HYDROCHLORIDE 0.5 MG: 1 INJECTION, SOLUTION INTRAMUSCULAR; INTRAVENOUS; SUBCUTANEOUS at 17:45

## 2021-09-16 RX ADMIN — OXYCODONE AND ACETAMINOPHEN 1 TABLET: 10; 325 TABLET ORAL at 20:30

## 2021-09-16 RX ADMIN — OXYCODONE AND ACETAMINOPHEN 1 TABLET: 10; 325 TABLET ORAL at 13:56

## 2021-09-16 RX ADMIN — GABAPENTIN 600 MG: 300 CAPSULE ORAL at 13:52

## 2021-09-16 RX ADMIN — HYDROMORPHONE HYDROCHLORIDE 0.5 MG: 1 INJECTION, SOLUTION INTRAMUSCULAR; INTRAVENOUS; SUBCUTANEOUS at 11:47

## 2021-09-16 RX ADMIN — TRAZODONE HYDROCHLORIDE 100 MG: 100 TABLET ORAL at 20:30

## 2021-09-16 RX ADMIN — CYCLOBENZAPRINE 10 MG: 10 TABLET, FILM COATED ORAL at 13:12

## 2021-09-16 RX ADMIN — ENOXAPARIN SODIUM 40 MG: 40 INJECTION SUBCUTANEOUS at 08:02

## 2021-09-16 ASSESSMENT — PAIN DESCRIPTION - LOCATION
LOCATION: BACK;RIB CAGE

## 2021-09-16 ASSESSMENT — PAIN SCALES - GENERAL
PAINLEVEL_OUTOF10: 7
PAINLEVEL_OUTOF10: 6
PAINLEVEL_OUTOF10: 7
PAINLEVEL_OUTOF10: 7
PAINLEVEL_OUTOF10: 8
PAINLEVEL_OUTOF10: 6
PAINLEVEL_OUTOF10: 7
PAINLEVEL_OUTOF10: 7
PAINLEVEL_OUTOF10: 8

## 2021-09-16 ASSESSMENT — PAIN DESCRIPTION - FREQUENCY
FREQUENCY: CONTINUOUS

## 2021-09-16 ASSESSMENT — PAIN DESCRIPTION - ORIENTATION
ORIENTATION: RIGHT

## 2021-09-16 ASSESSMENT — PAIN DESCRIPTION - PAIN TYPE
TYPE: ACUTE PAIN;SURGICAL PAIN

## 2021-09-16 ASSESSMENT — PAIN DESCRIPTION - DESCRIPTORS
DESCRIPTORS: ACHING

## 2021-09-16 ASSESSMENT — PAIN DESCRIPTION - ONSET: ONSET: ON-GOING

## 2021-09-16 NOTE — CARE COORDINATION
Discharge planning    Chart reviewed. Patient lives with spouse. He had a right lung bx and subsuequently developed large pneumo with right chest tube being placed. He is on 2 liters and also documented RA> declines any need for home care but will follow progress of chest tube. Discussed patient with pulmonary. Continue chest tube to wall suction, no clamping today secondary to air leak.   Will repeat x-ray chest again in a.m. to reevaluate

## 2021-09-16 NOTE — PROGRESS NOTES
Writer spoke with [facility] NP. Chest tube to be un-clamped and returned to suction. Patient updated, no questions at this time.

## 2021-09-16 NOTE — PROGRESS NOTES
Occupational Therapy  Facility/Department: [facility] PROGRESSIVE CARE  Daily Treatment Note  NAME: Candy Marrero  : 1960  MRN: [id number]    Date of Service: 2021    Discharge Recommendations:  Patient would benefit from continued therapy after discharge    Due to recent hospitalization and medical condition, pt would benefit from additional therapy at time of discharge to ensure safety. Please refer to the AM-PAC score for current functional status. Assessment   Performance deficits / Impairments: Decreased functional mobility ; Decreased safe awareness;Decreased balance;Decreased ADL status; Decreased endurance;Decreased high-level IADLs;Decreased coordination  Assessment: Pt progressign towards goals and tolerating treatment well. Pt would benefit from continued skilled OT services to address deficits in activity tolerance, strength, balance, and safety awareness, all limiting safe return home to [facility]. Prognosis: Fair  OT Education: OT Role;Plan of Care;Energy Conservation;Transfer Training;Equipment;ADL Adaptive Strategies;IADL Safety  Patient Education: Benefits of HEP, ECWS  Activity Tolerance  Activity Tolerance: Patient Tolerated treatment well  Safety Devices  Safety Devices in place: Yes  Type of devices: Call light within reach; Chair alarm in place; Left in chair;Patient at risk for falls;Gait belt;Nurse notified; All fall risk precautions in place         Patient Diagnosis(es): There were no encounter diagnoses. has a past medical history of Arthralgia, Cervical myelopathy ([address] DDD (degenerative disc disease), lumbar, Lumbar disc herniation, Retrolisthesis, and Spondylolisthesis of lumbar region. has a past surgical history that includes Cholecystectomy; back surgery (2018); Neck surgery; cervical fusion (N/A, 2021); CT NEEDLE BIOPSY LUNG PERCUTANEOUS (2021); and CT GUIDED CHEST TUBE (2021).     Restrictions  Restrictions/Precautions  Restrictions/Precautions: Equipment Evaluation, Education, & procurement, Self-Care / ADL, Pain Management, Balance Training, Functional Mobility Training, Safety Education & Training, Positioning, Home Management Training, Patient/Caregiver Education & Training           AM-PAC Score        AM-PAC Inpatient Daily Activity Raw Score: 20 (09/16/21 1120)  AM-PAC Inpatient ADL T-Scale Score : 42.03 (09/16/21 1120)  ADL Inpatient CMS 0-100% Score: 38.32 (09/16/21 1120)  ADL Inpatient CMS G-Code Modifier : Inga Gene (09/16/21 1120)    Goals  Short term goals  Time Frame for Short term goals: by discharge, pt to demo  Short term goal 1: bed mob tasks with use of rail as needed to MOD I. Short term goal 2: I with BUE HEP with use of handouts as needed to maintain strength. Short term goal 3: total body ADL tasks with use of AE/AD and grab bar as needed to SUP. Short term goal 4: ADL transfers and functional mob with AD as needed to SUP. Short term goal 5: standing to SBA and AD as needed jason > 8 mins as able to reduce falls in function. Long term goals  Long term goal 1: Pt/caregivers to be I with EC/WS and fall prevention tech and DME/AE and AD recommendations with handouts. Patient Goals   Patient goals : Get out of here! Therapy Time   Individual Concurrent Group Co-treatment   Time In 6268 (Additional 5 mmins creating personalized HEP and edu handout)         Time Out 2057         Minutes 25           RN reports patient is medically stable for therapy treatment this date. Chart reviewed prior to treatment and patient is agreeable for therapy. All lines intact and patient positioned comfortably at end of treatment. All patient needs addressed prior to ending therapy session.            Ariadna Brown DENIZ

## 2021-09-16 NOTE — PROGRESS NOTES
Pulmonary Critical Care Progress Note  Asuncion Davis, APRN-ROHITH/Mateus Pratt MD     Patient seen for the follow up of right upper lobe nodule, COPD, Postprocedural pneumothorax     Subjective:  No significant overnight events noted. He is resting comfortably in bed, no distress. His chest tube remains to suction. He still has positive air leak in his chest tube. He continues to have some mild discomfort around chest tube insertion site. Denies significant shortness of breath or cough. Examination:  Vitals: BP 94/66   Pulse 71   Temp 98.2 °F (36.8 °C) (Oral)   Resp 18   Ht 5' 8\" (1.727 m)   Wt 139 lb 9.6 oz (63.3 kg)   SpO2 98%   BMI 21.23 kg/m²   General appearance: alert and cooperative with exam, resting in bed  Neck: No JVD  Lungs: Moderate air exchange, positive crackles  Heart: regular rate and rhythm, S1, S2 normal, no gallop  Abdomen: Soft, non tender, + BS  Extremities: no cyanosis or clubbing. No significant edema    LABs:  CBC:   Recent Labs     09/15/21  2018 09/16/21  0549   WBC 7.0 7.3   HGB 12.7* 12.4*   HCT 39.1* 37.9*    260     BMP:   Recent Labs     09/15/21  2018 09/16/21  0549    139   K 4.2 4.7   CO2 29 30   BUN 13 12   CREATININE 0.82 0.77   LABGLOM >60 >60   GLUCOSE 84 111*     PT/INR:   Recent Labs     09/14/21  0900 09/15/21  0637   PROTIME 12.4 13.3   INR 0.9 1.0     APTT:  Recent Labs     09/14/21  0900   APTT 26.1     Radiology:  9/16/21  Trace apical right pneumothorax with indwelling smallbore chest tube. Soft tissue emphysema. Right basilar atelectasis. Impression:  · Right pneumothorax  · Right upper lobe lung nodule s/p biopsy 9/14/2021  · COPD/active smoking, 70-pack-year history    Recommendations:  · Oxygen via nasal cannula at 2 L secondary to pneumothorax  · Continue chest tube to wall suction, no clamping today secondary to air leak. Will repeat x-ray chest again in a.m. to reevaluate.   · Continue Bevespi  · Pain control per primary team  · X-ray chest in am  · Labs: CBC and BMP in am  · DVT prophylaxis with low molecular weight heparin  · Discussed with RN  · Above assessment and plan will be reviewed with Dr. Maricamren Sanon will be finalized following review by Dr. Perla Evans.   · Will follow with you    ADELAIDE Eng-CNP   Pulmonary Critical Care and Sleep Medicine,  Patient seen under the supervision of Cindy Rocha MD, [facility]

## 2021-09-16 NOTE — PROGRESS NOTES
Subjective:     Follow-up COPD  Denies any fever chills cough minimal shortness of breath no tachypnea no hypoxia  ROS  Fever no chills no GI/ complaints no cardiac complaints no TIA no bleeding no headache no sore throat no skin lesions no polyuria no polydipsia no hypoglycemia complaining of chronic low back pain and pain around the chest wall tube site  physical exam  General Appearance: in no acute distress and alert  Skin: warm and dry, no rash or erythema  Head: normocephalic and atraumatic  Eyes: pupils equal, round, and reactive to light, conjunctivae normal and sclera anicteric    Neck: neck supple and non tender without mass   Pulmonary/Chest: Air entry equal prolonged expiratory phase decreased at the bases no rhonchi no wheezing no use of intercostal muscles  Cardiovascular: normal rate, regular rhythm, normal S1 and S2, no gallops, intact distal pulses and no carotid bruits  Abdomen: soft, non-tender, non-distended, normal bowel sounds, no masses or organomegaly  Extremities: no edema and good pulses no Homans' sign    Neurologic: Alert oriented x3 with no focal deficit    /67   Pulse 83   Temp 99 °F (37.2 °C) (Oral)   Resp 18   Ht 5' 8\" (1.727 m)   Wt 139 lb 9.6 oz (63.3 kg)   SpO2 90%   BMI 21.23 kg/m²     CBC with Differential:    Lab Results   Component Value Date    WBC 7.3 09/16/2021    RBC 3.93 09/16/2021    HGB 12.4 09/16/2021    HCT 37.9 09/16/2021     09/16/2021    MCV 96.4 09/16/2021    MCH 31.6 09/16/2021    MCHC 32.7 09/16/2021    RDW 13.2 09/16/2021    LYMPHOPCT 18 09/16/2021    MONOPCT 12 09/16/2021    BASOPCT 1 09/16/2021    MONOSABS 0.86 09/16/2021    LYMPHSABS 1.28 09/16/2021    EOSABS 0.12 09/16/2021    BASOSABS 0.05 09/16/2021    DIFFTYPE NOT REPORTED 09/16/2021     BMP:    Lab Results   Component Value Date     09/16/2021    K 4.7 09/16/2021     09/16/2021    CO2 30 09/16/2021    BUN 12 09/16/2021    CREATININE 0.77 09/16/2021    CALCIUM 8.5 09/16/2021    GFRAA >60 09/16/2021    LABGLOM >60 09/16/2021    GLUCOSE 111 09/16/2021        Assessment:  Patient Active Problem List   Diagnosis    Cervical spondylosis with myelopathy    Postprocedural pneumothorax    Pneumothorax     Post procedure will right-sided pneumothorax  Lung nodule  COPD  Chronic smoker  Chronic low back pain  Subcutaneous emphysema  Mild anemia  Plan:    Labs reviewed continue with DVT prophylaxis smoking cessation advised continue with bronchodilators chest tube per pulmonary continue with Dilaudid for pain as well as the oral will check X40 folic acid and iron stores    Lamine Kingsley MD MD  6:32 PM

## 2021-09-16 NOTE — PLAN OF CARE
Problem: Pain:  Goal: Pain level will decrease  Description: Pain level will decrease  9/16/2021 1002 by Bertrand Escobar RN  Outcome: Ongoing     Problem: Pain:  Goal: Control of acute pain  Description: Control of acute pain  9/16/2021 1002 by Bertrand Escobar RN  Outcome: Ongoing     Problem: IP BALANCE  Goal: LTG - Patient will maintain balance to allow for safe/functional mobility  9/16/2021 1002 by Bertrand Escobar RN  Outcome: Ongoing     Problem: Falls - Risk of:  Goal: Will remain free from falls  Description: Will remain free from falls  9/16/2021 1002 by Bertrand Escobar RN  Outcome: Ongoing  9/16/2021 1001 by Bertrand Escobar RN  Outcome: Ongoing  9/16/2021 0430 by Darwin Lloyd RN  Outcome: Ongoing     Problem: Breathing Pattern - Ineffective:  Goal: Ability to achieve and maintain a regular respiratory rate will improve  Description: Ability to achieve and maintain a regular respiratory rate will improve  Outcome: Ongoing

## 2021-09-17 ENCOUNTER — APPOINTMENT (OUTPATIENT)
Dept: GENERAL RADIOLOGY | Age: 61
DRG: 200 | End: 2021-09-17
Attending: INTERNAL MEDICINE
Payer: COMMERCIAL

## 2021-09-17 ENCOUNTER — APPOINTMENT (OUTPATIENT)
Dept: CT IMAGING | Age: 61
DRG: 200 | End: 2021-09-17
Attending: INTERNAL MEDICINE
Payer: COMMERCIAL

## 2021-09-17 LAB
ABSOLUTE EOS #: 0.11 K/UL (ref 0–0.44)
ABSOLUTE IMMATURE GRANULOCYTE: 0.02 K/UL (ref 0–0.3)
ABSOLUTE LYMPH #: 1.29 K/UL (ref 1.1–3.7)
ABSOLUTE MONO #: 0.8 K/UL (ref 0.1–1.2)
ANION GAP SERPL CALCULATED.3IONS-SCNC: 7 MMOL/L (ref 9–17)
BASOPHILS # BLD: 0 % (ref 0–2)
BASOPHILS ABSOLUTE: <0.03 K/UL (ref 0–0.2)
BUN BLDV-MCNC: 11 MG/DL (ref 8–23)
BUN/CREAT BLD: 13 (ref 9–20)
CALCIUM SERPL-MCNC: 8.6 MG/DL (ref 8.6–10.4)
CHLORIDE BLD-SCNC: 105 MMOL/L (ref 98–107)
CO2: 30 MMOL/L (ref 20–31)
CREAT SERPL-MCNC: 0.82 MG/DL (ref 0.7–1.2)
DIFFERENTIAL TYPE: ABNORMAL
EOSINOPHILS RELATIVE PERCENT: 2 % (ref 1–4)
FOLATE: 7.6 NG/ML
GFR AFRICAN AMERICAN: >60 ML/MIN
GFR NON-AFRICAN AMERICAN: >60 ML/MIN
GFR SERPL CREATININE-BSD FRML MDRD: ABNORMAL ML/MIN/{1.73_M2}
GFR SERPL CREATININE-BSD FRML MDRD: ABNORMAL ML/MIN/{1.73_M2}
GLUCOSE BLD-MCNC: 100 MG/DL (ref 70–99)
HCT VFR BLD CALC: 38.2 % (ref 40.7–50.3)
HEMOGLOBIN: 12.3 G/DL (ref 13–17)
IMMATURE GRANULOCYTES: 0 %
IRON SATURATION: 14 % (ref 20–55)
IRON: 32 UG/DL (ref 59–158)
LYMPHOCYTES # BLD: 22 % (ref 24–43)
MCH RBC QN AUTO: 31.1 PG (ref 25.2–33.5)
MCHC RBC AUTO-ENTMCNC: 32.2 G/DL (ref 28.4–34.8)
MCV RBC AUTO: 96.7 FL (ref 82.6–102.9)
MONOCYTES # BLD: 14 % (ref 3–12)
NRBC AUTOMATED: 0 PER 100 WBC
PDW BLD-RTO: 13.1 % (ref 11.8–14.4)
PLATELET # BLD: 261 K/UL (ref 138–453)
PLATELET ESTIMATE: ABNORMAL
PMV BLD AUTO: 9.3 FL (ref 8.1–13.5)
POTASSIUM SERPL-SCNC: 4.2 MMOL/L (ref 3.7–5.3)
RBC # BLD: 3.95 M/UL (ref 4.21–5.77)
RBC # BLD: ABNORMAL 10*6/UL
SEG NEUTROPHILS: 62 % (ref 36–65)
SEGMENTED NEUTROPHILS ABSOLUTE COUNT: 3.66 K/UL (ref 1.5–8.1)
SODIUM BLD-SCNC: 142 MMOL/L (ref 135–144)
TOTAL IRON BINDING CAPACITY: 234 UG/DL (ref 250–450)
UNSATURATED IRON BINDING CAPACITY: 202 UG/DL (ref 112–347)
VITAMIN B-12: 503 PG/ML (ref 232–1245)
WBC # BLD: 5.9 K/UL (ref 3.5–11.3)
WBC # BLD: ABNORMAL 10*3/UL

## 2021-09-17 PROCEDURE — 71250 CT THORAX DX C-: CPT

## 2021-09-17 PROCEDURE — 97116 GAIT TRAINING THERAPY: CPT

## 2021-09-17 PROCEDURE — 80048 BASIC METABOLIC PNL TOTAL CA: CPT

## 2021-09-17 PROCEDURE — 6370000000 HC RX 637 (ALT 250 FOR IP): Performed by: NURSE PRACTITIONER

## 2021-09-17 PROCEDURE — 6370000000 HC RX 637 (ALT 250 FOR IP): Performed by: INTERNAL MEDICINE

## 2021-09-17 PROCEDURE — 71045 X-RAY EXAM CHEST 1 VIEW: CPT

## 2021-09-17 PROCEDURE — 77012 CT SCAN FOR NEEDLE BIOPSY: CPT

## 2021-09-17 PROCEDURE — 2700000000 HC OXYGEN THERAPY PER DAY

## 2021-09-17 PROCEDURE — 2709999900 CT GUIDED CHEST TUBE

## 2021-09-17 PROCEDURE — 36415 COLL VENOUS BLD VENIPUNCTURE: CPT

## 2021-09-17 PROCEDURE — 94761 N-INVAS EAR/PLS OXIMETRY MLT: CPT

## 2021-09-17 PROCEDURE — 94640 AIRWAY INHALATION TREATMENT: CPT

## 2021-09-17 PROCEDURE — 2060000000 HC ICU INTERMEDIATE R&B

## 2021-09-17 PROCEDURE — 6360000002 HC RX W HCPCS: Performed by: INTERNAL MEDICINE

## 2021-09-17 PROCEDURE — 2580000003 HC RX 258: Performed by: NURSE PRACTITIONER

## 2021-09-17 PROCEDURE — 85025 COMPLETE CBC W/AUTO DIFF WBC: CPT

## 2021-09-17 RX ADMIN — GABAPENTIN 600 MG: 300 CAPSULE ORAL at 20:31

## 2021-09-17 RX ADMIN — GABAPENTIN 600 MG: 300 CAPSULE ORAL at 16:44

## 2021-09-17 RX ADMIN — SODIUM CHLORIDE, PRESERVATIVE FREE 10 ML: 5 INJECTION INTRAVENOUS at 09:14

## 2021-09-17 RX ADMIN — GLYCOPYRROLATE AND FORMOTEROL FUMARATE 2 PUFF: 9; 4.8 AEROSOL, METERED RESPIRATORY (INHALATION) at 07:48

## 2021-09-17 RX ADMIN — OXYCODONE AND ACETAMINOPHEN 1 TABLET: 10; 325 TABLET ORAL at 16:44

## 2021-09-17 RX ADMIN — SODIUM CHLORIDE, PRESERVATIVE FREE 10 ML: 5 INJECTION INTRAVENOUS at 22:07

## 2021-09-17 RX ADMIN — GLYCOPYRROLATE AND FORMOTEROL FUMARATE 2 PUFF: 9; 4.8 AEROSOL, METERED RESPIRATORY (INHALATION) at 20:11

## 2021-09-17 RX ADMIN — OXYCODONE AND ACETAMINOPHEN 1 TABLET: 10; 325 TABLET ORAL at 03:52

## 2021-09-17 RX ADMIN — GABAPENTIN 600 MG: 300 CAPSULE ORAL at 05:59

## 2021-09-17 RX ADMIN — TRAZODONE HYDROCHLORIDE 100 MG: 100 TABLET ORAL at 20:31

## 2021-09-17 RX ADMIN — OXYCODONE AND ACETAMINOPHEN 1 TABLET: 10; 325 TABLET ORAL at 10:19

## 2021-09-17 RX ADMIN — HYDROMORPHONE HYDROCHLORIDE 0.5 MG: 1 INJECTION, SOLUTION INTRAMUSCULAR; INTRAVENOUS; SUBCUTANEOUS at 13:12

## 2021-09-17 RX ADMIN — CYCLOBENZAPRINE 10 MG: 10 TABLET, FILM COATED ORAL at 13:12

## 2021-09-17 RX ADMIN — HYDROMORPHONE HYDROCHLORIDE 0.5 MG: 1 INJECTION, SOLUTION INTRAMUSCULAR; INTRAVENOUS; SUBCUTANEOUS at 09:12

## 2021-09-17 RX ADMIN — HYDROMORPHONE HYDROCHLORIDE 0.5 MG: 1 INJECTION, SOLUTION INTRAMUSCULAR; INTRAVENOUS; SUBCUTANEOUS at 00:44

## 2021-09-17 RX ADMIN — OXYCODONE AND ACETAMINOPHEN 1 TABLET: 10; 325 TABLET ORAL at 22:05

## 2021-09-17 RX ADMIN — CYCLOBENZAPRINE 10 MG: 10 TABLET, FILM COATED ORAL at 20:31

## 2021-09-17 ASSESSMENT — PAIN SCALES - GENERAL
PAINLEVEL_OUTOF10: 0
PAINLEVEL_OUTOF10: 7
PAINLEVEL_OUTOF10: 6
PAINLEVEL_OUTOF10: 7
PAINLEVEL_OUTOF10: 0
PAINLEVEL_OUTOF10: 7
PAINLEVEL_OUTOF10: 6
PAINLEVEL_OUTOF10: 5
PAINLEVEL_OUTOF10: 7
PAINLEVEL_OUTOF10: 7

## 2021-09-17 ASSESSMENT — PAIN DESCRIPTION - ONSET
ONSET: ON-GOING

## 2021-09-17 ASSESSMENT — PAIN DESCRIPTION - FREQUENCY
FREQUENCY: CONTINUOUS

## 2021-09-17 ASSESSMENT — PAIN DESCRIPTION - PROGRESSION
CLINICAL_PROGRESSION: NOT CHANGED

## 2021-09-17 ASSESSMENT — PAIN DESCRIPTION - ORIENTATION
ORIENTATION: RIGHT

## 2021-09-17 ASSESSMENT — PAIN DESCRIPTION - DESCRIPTORS
DESCRIPTORS: ACHING

## 2021-09-17 ASSESSMENT — PAIN DESCRIPTION - LOCATION
LOCATION: CHEST

## 2021-09-17 NOTE — PROGRESS NOTES
Subjective:     Follow-up COPD  No fever no chills no cough mild shortness of breath  ROS  No fever no chills no GI/ complaints no cardiac complaints no TIA no bleeding no headache no sore throat no skin lesions no polyuria polydipsia or hypoglycemia chronic low back pain and pain at the chest tube site he had another chest tube for 10  physical exam  General Appearance: in no acute distress and alert  Skin: warm and dry, no rash or erythema subcutaneous crepitation head: normocephalic and atraumatic  Eyes: pupils equal, round, and reactive to light, conjunctivae normal and sclera anicteric     Neck: neck supple and non tender without mass   Pulmonary/Chest: clear to auscultation bilaterally- no wheezes, rales or rhonchi, normal air movement, no respiratory distress  Cardiovascular: normal rate, regular rhythm, normal S1 and S2, no gallops, intact distal pulses and no carotid bruits  Abdomen: soft, non-tender, non-distended, normal bowel sounds, no masses or organomegaly  Extremities: no edema and good pulses no Homans' sign    Neurologic: Alert oriented x3 with no focal deficit    BP (!) 114/58   Pulse 71   Temp 98.1 °F (36.7 °C) (Oral)   Resp 16   Ht 5' 8\" (1.727 m)   Wt 143 lb 1 oz (64.9 kg)   SpO2 96%   BMI 21.75 kg/m²     CBC:   Lab Results   Component Value Date    WBC 5.9 09/17/2021    RBC 3.95 09/17/2021    HGB 12.3 09/17/2021    HCT 38.2 09/17/2021    MCV 96.7 09/17/2021    MCH 31.1 09/17/2021    MCHC 32.2 09/17/2021    RDW 13.1 09/17/2021     09/17/2021    MPV 9.3 09/17/2021     BMP:    Lab Results   Component Value Date     09/17/2021    K 4.2 09/17/2021     09/17/2021    CO2 30 09/17/2021    BUN 11 09/17/2021    CREATININE 0.82 09/17/2021    CALCIUM 8.6 09/17/2021    GFRAA >60 09/17/2021    LABGLOM >60 09/17/2021    GLUCOSE 100 09/17/2021        Assessment:  Patient Active Problem List   Diagnosis    Cervical spondylosis with myelopathy    Postprocedural pneumothorax    Pneumothorax     Post procedure right-sided pneumothorax  Lung nodule  COPD  Chronic low back pain  Chronic smoker  Subcutaneous emphysema  Plan:    Meds labs reviewed continue with bronchodilators continue with pain control he just had a second chest tube to 10 DVT prophylaxis see jay Vigil MD MD  6:12 PM

## 2021-09-17 NOTE — PROGRESS NOTES
Physical Therapy  Facility/Department: [facility] PROGRESSIVE CARE  Daily Treatment Note  NAME: Marilyn Moctezuma  : 1960  MRN: [id number]    Date of Service: 2021    Discharge Recommendations:  Patient would benefit from continued therapy after discharge        Assessment   Body structures, Functions, Activity limitations: Decreased functional mobility ; Decreased ADL status; Decreased strength;Decreased safe awareness;Decreased endurance;Decreased balance  Assessment: Pt with minimal deficits noted in bed mobility, transfers, ambulation, balance, and endurance this session. Pt requires continued PT to maximize independence with functional mobility, balance, safety awareness & activity tolerance. Pt would benefit from additional therapy at time of discharge. Please refer to the AM-PAC score for current functional status. Activity Tolerance  Activity Tolerance: Patient limited by endurance     Patient Diagnosis(es): There were no encounter diagnoses. has a past medical history of Arthralgia, Cervical myelopathy ([address] DDD (degenerative disc disease), lumbar, Lumbar disc herniation, Retrolisthesis, and Spondylolisthesis of lumbar region. has a past surgical history that includes Cholecystectomy; back surgery (2018); Neck surgery; cervical fusion (N/A, 2021); CT NEEDLE BIOPSY LUNG PERCUTANEOUS (2021); and CT GUIDED CHEST TUBE (2021). Restrictions  Restrictions/Precautions  Restrictions/Precautions: General Precautions, Fall Risk  Position Activity Restriction  Other position/activity restrictions: Up with assist, chest tube on R, RUE IV, alarms  Subjective   General  Chart Reviewed:  Yes  Additional Pertinent Hx: COPD, chronic smoker, cervical spondylosis with myelopathy  Subjective  Subjective: Pt agreeable to PT  Pain Screening  Patient Currently in Pain: Yes  Pain Assessment  Pain Assessment: 0-10  Pain Level: 5  Pain Type:  (chest tube site)  Pain Location: Chest  Vital Signs  Patient Currently in Pain: Yes       Orientation     Cognition      Objective   Bed mobility  Scooting: Contact guard assistance  Transfers  Sit to Stand: Contact guard assistance  Stand to sit: Contact guard assistance  Stand Pivot Transfers: Contact guard assistance  Ambulation  Ambulation?: Yes  Ambulation 1  Surface: level tile  Device: No Device  Assistance: Contact guard assistance  Quality of Gait: amb wide KRISTIN with ataxic pattern of RLE  Gait Deviations: Decreased step length;Decreased step height  Distance: 30 ft  Comments: impulsive with all mobility     Balance  Posture: Good  Sitting - Static: Good  Sitting - Dynamic: Good  Standing - Static: Good;-  Standing - Dynamic: Fair                           G-Code     OutComes Score                                                     AM-PAC Score  AM-PAC Inpatient Mobility Raw Score : 15 (09/17/21 1327)  AM-PAC Inpatient T-Scale Score : 39.45 (09/17/21 1327)  Mobility Inpatient CMS 0-100% Score: 57.7 (09/17/21 1327)  Mobility Inpatient CMS G-Code Modifier : CK (09/17/21 1327)          Goals  Short term goals  Time Frame for Short term goals: 12 visits  Short term goal 1: Inc bed-mobility & transfers to independent to enable pt to safely get in/OOB & chair  Short term goal 2: Inc gait to amb 350ft or > indep to enable pt to return to previous level of independence  Short term goal 3: Pt able to go up/down 6 steps with one rail indep  Short term goal 4:  Inc standing balance to good with device to facilitate pt independence for performance of ADL's & functional mobility, & reduce fall risk  Short term goal 5: Ed pt on home ex's, safety & energy principles, fall prevention, & issue written home program    Plan    Plan  Times per week: 1-2x/D, 5-6D/week  Current Treatment Recommendations: Strengthening, Balance Training, Functional Mobility Training, Transfer Training, Endurance Training, ADL/Self-care Training  Safety Devices  Type of devices: Call light within reach, Gait belt, Patient at risk for falls, Left in chair, Nurse notified     Therapy Time   Individual Concurrent Group Co-treatment   Time In  850         Time Out  902         Minutes  12                 MAYUR HORVATH, PTA

## 2021-09-17 NOTE — PROGRESS NOTES
Patient back from IR. Pt alert and oriented. Second chest tube to RU chest. No leaks, no crepitus noted. Will continue to monitor.

## 2021-09-17 NOTE — PLAN OF CARE
Problem: Falls - Risk of:  Goal: Will remain free from falls  Description: Will remain free from falls  9/16/2021 2255 by Guy Olea RN  Outcome: Ongoing     Problem: Pain:  Goal: Pain level will decrease  Description: Pain level will decrease  9/16/2021 2255 by Guy Olea RN  Outcome: Ongoing     Problem: Pain:  Goal: Control of acute pain  Description: Control of acute pain  9/16/2021 2255 by Guy Olea RN  Outcome: Ongoing     Problem: Pain:  Goal: Control of chronic pain  Description: Control of chronic pain  9/16/2021 2255 by Guy Olea RN  Outcome: Ongoing     Problem: Breathing Pattern - Ineffective:  Goal: Ability to achieve and maintain a regular respiratory rate will improve  Description: Ability to achieve and maintain a regular respiratory rate will improve  9/16/2021 2255 by Guy Olea RN  Outcome: Ongoing

## 2021-09-17 NOTE — PROGRESS NOTES
CBC and BMP in am  · DVT prophylaxis with low molecular weight heparin  · Discussed with RN  · Above assessment and plan will be reviewed with Dr. Maricarmen Sanon will be finalized following review by Dr. Perla Evans.   · Will follow with you    ADELAIDE Eng-CNP   Pulmonary Critical Care and Sleep Medicine,  Patient seen under the supervision of Cindy Rocha MD, [facility]

## 2021-09-17 NOTE — PROGRESS NOTES
Occupational Therapy    DATE: 2021    NAME: Leanen Gage  MRN: [id number]   : 1960    Patient not seen this date for Occupational Therapy due to:  [] Blood transfusion in progress  [] Cancel by RN  [] Hemodialysis  []  Refusal by Patient   [] Spine Precautions   [] Strict Bedrest  [] Surgery  [] Testing      [x] Other - Pt going to IR for chest tube placement         [] OT being discontinued at this time. Patient independent. No further needs. [] OT being discontinued at this time as the patient has been transferred to hospice care. No further needs.     Leisa Harrison OTR/L

## 2021-09-18 ENCOUNTER — APPOINTMENT (OUTPATIENT)
Dept: GENERAL RADIOLOGY | Age: 61
DRG: 200 | End: 2021-09-18
Attending: INTERNAL MEDICINE
Payer: COMMERCIAL

## 2021-09-18 PROCEDURE — 2580000003 HC RX 258: Performed by: NURSE PRACTITIONER

## 2021-09-18 PROCEDURE — 6370000000 HC RX 637 (ALT 250 FOR IP): Performed by: NURSE PRACTITIONER

## 2021-09-18 PROCEDURE — 6360000002 HC RX W HCPCS: Performed by: INTERNAL MEDICINE

## 2021-09-18 PROCEDURE — 6360000002 HC RX W HCPCS: Performed by: NURSE PRACTITIONER

## 2021-09-18 PROCEDURE — 94640 AIRWAY INHALATION TREATMENT: CPT

## 2021-09-18 PROCEDURE — 71045 X-RAY EXAM CHEST 1 VIEW: CPT

## 2021-09-18 PROCEDURE — 6370000000 HC RX 637 (ALT 250 FOR IP): Performed by: INTERNAL MEDICINE

## 2021-09-18 PROCEDURE — 2700000000 HC OXYGEN THERAPY PER DAY

## 2021-09-18 PROCEDURE — 2060000000 HC ICU INTERMEDIATE R&B

## 2021-09-18 PROCEDURE — 94761 N-INVAS EAR/PLS OXIMETRY MLT: CPT

## 2021-09-18 PROCEDURE — 97535 SELF CARE MNGMENT TRAINING: CPT

## 2021-09-18 RX ORDER — LANOLIN ALCOHOL/MO/W.PET/CERES
325 CREAM (GRAM) TOPICAL 2 TIMES DAILY WITH MEALS
Status: DISCONTINUED | OUTPATIENT
Start: 2021-09-18 | End: 2021-09-22 | Stop reason: HOSPADM

## 2021-09-18 RX ADMIN — SODIUM CHLORIDE, PRESERVATIVE FREE 10 ML: 5 INJECTION INTRAVENOUS at 20:38

## 2021-09-18 RX ADMIN — SODIUM CHLORIDE, PRESERVATIVE FREE 10 ML: 5 INJECTION INTRAVENOUS at 08:30

## 2021-09-18 RX ADMIN — HYDROMORPHONE HYDROCHLORIDE 0.5 MG: 1 INJECTION, SOLUTION INTRAMUSCULAR; INTRAVENOUS; SUBCUTANEOUS at 15:04

## 2021-09-18 RX ADMIN — FERROUS SULFATE TAB EC 325 MG (65 MG FE EQUIVALENT) 325 MG: 325 (65 FE) TABLET DELAYED RESPONSE at 16:44

## 2021-09-18 RX ADMIN — GABAPENTIN 600 MG: 300 CAPSULE ORAL at 05:40

## 2021-09-18 RX ADMIN — HYDROMORPHONE HYDROCHLORIDE 0.5 MG: 1 INJECTION, SOLUTION INTRAMUSCULAR; INTRAVENOUS; SUBCUTANEOUS at 02:23

## 2021-09-18 RX ADMIN — CYCLOBENZAPRINE 10 MG: 10 TABLET, FILM COATED ORAL at 12:40

## 2021-09-18 RX ADMIN — TRAZODONE HYDROCHLORIDE 100 MG: 100 TABLET ORAL at 20:40

## 2021-09-18 RX ADMIN — HYDROMORPHONE HYDROCHLORIDE 0.5 MG: 1 INJECTION, SOLUTION INTRAMUSCULAR; INTRAVENOUS; SUBCUTANEOUS at 08:27

## 2021-09-18 RX ADMIN — GABAPENTIN 600 MG: 300 CAPSULE ORAL at 20:35

## 2021-09-18 RX ADMIN — CYCLOBENZAPRINE 10 MG: 10 TABLET, FILM COATED ORAL at 20:35

## 2021-09-18 RX ADMIN — HYDROMORPHONE HYDROCHLORIDE 0.5 MG: 1 INJECTION, SOLUTION INTRAMUSCULAR; INTRAVENOUS; SUBCUTANEOUS at 20:35

## 2021-09-18 RX ADMIN — ENOXAPARIN SODIUM 40 MG: 40 INJECTION SUBCUTANEOUS at 08:27

## 2021-09-18 RX ADMIN — OXYCODONE AND ACETAMINOPHEN 1 TABLET: 10; 325 TABLET ORAL at 05:40

## 2021-09-18 RX ADMIN — GLYCOPYRROLATE AND FORMOTEROL FUMARATE 2 PUFF: 9; 4.8 AEROSOL, METERED RESPIRATORY (INHALATION) at 10:12

## 2021-09-18 RX ADMIN — GLYCOPYRROLATE AND FORMOTEROL FUMARATE 2 PUFF: 9; 4.8 AEROSOL, METERED RESPIRATORY (INHALATION) at 20:01

## 2021-09-18 RX ADMIN — OXYCODONE AND ACETAMINOPHEN 1 TABLET: 10; 325 TABLET ORAL at 11:41

## 2021-09-18 RX ADMIN — OXYCODONE AND ACETAMINOPHEN 1 TABLET: 10; 325 TABLET ORAL at 17:54

## 2021-09-18 RX ADMIN — GABAPENTIN 600 MG: 300 CAPSULE ORAL at 15:11

## 2021-09-18 ASSESSMENT — PAIN SCALES - GENERAL
PAINLEVEL_OUTOF10: 7
PAINLEVEL_OUTOF10: 6
PAINLEVEL_OUTOF10: 4
PAINLEVEL_OUTOF10: 6
PAINLEVEL_OUTOF10: 8
PAINLEVEL_OUTOF10: 7
PAINLEVEL_OUTOF10: 0
PAINLEVEL_OUTOF10: 4
PAINLEVEL_OUTOF10: 7
PAINLEVEL_OUTOF10: 0

## 2021-09-18 ASSESSMENT — PAIN DESCRIPTION - FREQUENCY
FREQUENCY: CONTINUOUS

## 2021-09-18 ASSESSMENT — PAIN - FUNCTIONAL ASSESSMENT
PAIN_FUNCTIONAL_ASSESSMENT: PREVENTS OR INTERFERES SOME ACTIVE ACTIVITIES AND ADLS

## 2021-09-18 ASSESSMENT — PAIN DESCRIPTION - PROGRESSION
CLINICAL_PROGRESSION: NOT CHANGED
CLINICAL_PROGRESSION: GRADUALLY IMPROVING
CLINICAL_PROGRESSION: NOT CHANGED
CLINICAL_PROGRESSION: GRADUALLY IMPROVING

## 2021-09-18 ASSESSMENT — PAIN DESCRIPTION - LOCATION
LOCATION: CHEST

## 2021-09-18 ASSESSMENT — PAIN DESCRIPTION - DESCRIPTORS
DESCRIPTORS: ACHING

## 2021-09-18 ASSESSMENT — PAIN DESCRIPTION - ORIENTATION
ORIENTATION: RIGHT

## 2021-09-18 ASSESSMENT — PAIN DESCRIPTION - PAIN TYPE
TYPE: ACUTE PAIN

## 2021-09-18 ASSESSMENT — PAIN DESCRIPTION - ONSET
ONSET: ON-GOING

## 2021-09-18 NOTE — PLAN OF CARE
Problem: Falls - Risk of:  Goal: Will remain free from falls  Description: Will remain free from falls  9/18/2021 1956 by Aubrey Rosenberg RN  Outcome: Ongoing  Note: Pt fall risk, fall band present, falling star, safety alarm activated and in use as needed. Hourly rounding performed. Pt encouraged to use call light. See Nilton Castellanos fall risk assessment. 9/18/2021 1024 by Nathaniel Medrano RN  Outcome: Ongoing  Goal: Absence of physical injury  Description: Absence of physical injury  9/18/2021 1956 by Aubrey Rosenberg RN  Outcome: Ongoing  Note: Non-skid socks in place, up with assistance, bed in lowest position, bed exit & alarm as needed, provide toileting every 2 hours an d as needed. 9/18/2021 1024 by Nathaniel Medrano RN  Outcome: Ongoing     Problem: Pain:  Goal: Pain level will decrease  Description: Pain level will decrease  9/18/2021 1956 by Aubrey Rosenberg RN  Outcome: Ongoing  Note: Monitoring pain with each assessment and prn. NONA 0-10 pain scale utilized. Non-pharmacological measures to be encouraged prior to pharmacological measures. 9/18/2021 1024 by Nathaniel Medrano RN  Outcome: Ongoing  Goal: Control of acute pain  Description: Control of acute pain  9/18/2021 1956 by Aubrey Rosenberg RN  Outcome: Ongoing  9/18/2021 1024 by Nathaniel Medrano RN  Outcome: Ongoing  Goal: Control of chronic pain  Description: Control of chronic pain  9/18/2021 1956 by Aubrey Rosenberg RN  Outcome: Ongoing  9/18/2021 1024 by Nathaniel Medrano RN  Outcome: Ongoing     Problem: IP BALANCE  Goal: LTG - Patient will maintain balance to allow for safe/functional mobility  9/18/2021 1956 by Aubrey Rosenberg RN  Outcome: Ongoing  Note: Pt able to utilize fine body movements, including, but not limited to dressing without assistance.    9/18/2021 1024 by Nathaniel Medrano RN  Outcome: Ongoing     Problem: Breathing Pattern - Ineffective:  Goal: Ability to achieve and maintain a regular respiratory rate will improve  Description: Ability to achieve and maintain a regular respiratory rate will improve  9/18/2021 1956 by Rafa Noe, RN  Outcome: Ongoing  Note: Assess for adventitious breath sounds. Monitored SaO2 > 90%. Applied 02 per nasal cannula as needed. Elevated HOB to improve breathing as needed.      9/18/2021 1024 by Xochitl Wolf RN  Outcome: Ongoing

## 2021-09-18 NOTE — PROGRESS NOTES
Report called to [address] [facility] 1024 called to [phone]. [facility] in transit to transport patient. Pt pleasant and cooperative.

## 2021-09-18 NOTE — PROGRESS NOTES
Pulmonary Critical Care Progress Note  \     Patient seen for the follow up of right upper lobe nodule, COPD, Postprocedural pneumothorax     Subjective:  Patient had additional rt chest tube placed on 9/17. There are now two chest tubes on the rt, both to suction    Examination:  Vitals: /61   Pulse 68   Temp 97.9 °F (36.6 °C) (Oral)   Resp 16   Ht 5' 8\" (1.727 m)   Wt 139 lb 14.4 oz (63.5 kg)   SpO2 98%   BMI 21.27 kg/m²   General appearance: alert and cooperative with exam, resting in bed  Neck: No JVD  Lungs: fair air exchange with diminished bases, crackles. Positive subcutaneous emphysema right chest neck and extending to entire rt side of posterior chest wall, there is bubbling in chest tube #2 only. 98% on 2 l nc  Heart: regular rate and rhythm, S1, S2 normal, no gallop  Abdomen: Soft, non tender, + BS  Extremities: no cyanosis or clubbing. No significant edema    LABs:  CBC:   Recent Labs     09/16/21  0549 09/17/21  0503   WBC 7.3 5.9   HGB 12.4* 12.3*   HCT 37.9* 38.2*    261     BMP:   Recent Labs     09/16/21  0549 09/17/21  0503    142   K 4.7 4.2   CO2 30 30   BUN 12 11   CREATININE 0.77 0.82   LABGLOM >60 >60   GLUCOSE 111* 100*     PT/INR:   No results for input(s): PROTIME, INR in the last 72 hours. Radiology:  9/17/21  Worsening small right pneumothorax.   Subcutaneous emphysema right neck and chest.      CT chest 9/17/2021      Impression:  · Right pneumothorax  · Right upper lobe lung nodule s/p biopsy 9/14/2021  · COPD/active smoking, 70-pack-year history    Recommendations:  · Oxygen via nasal cannula at 2 L secondary to pneumothorax  · Continue chest tube to wall suction  · Continue Bevespi  · Pain control per primary team  · X-ray chest now and in am 9-19-21   · todays CXR with resolving PTX  · Labs: CBC and BMP in am  · DVT prophylaxis with low molecular weight heparin    Plan of car discussed with Dr Yoselin Toro, CNP

## 2021-09-18 NOTE — PLAN OF CARE
Problem: Falls - Risk of:  Goal: Will remain free from falls  Description: Will remain free from falls  9/18/2021 1024 by Buddie Lombard, RN  Outcome: Ongoing  9/18/2021 0137 by Mehreen Bernabe RN  Outcome: Ongoing  Goal: Absence of physical injury  Description: Absence of physical injury  9/18/2021 1024 by Buddie Lombard, RN  Outcome: Ongoing  9/18/2021 0137 by Mehreen Bernabe RN  Outcome: Ongoing     Problem: Pain:  Goal: Pain level will decrease  Description: Pain level will decrease  9/18/2021 1024 by Buddie Lombard, RN  Outcome: Ongoing  9/18/2021 0137 by Mehreen Bernabe RN  Outcome: Ongoing  Goal: Control of acute pain  Description: Control of acute pain  9/18/2021 1024 by Buddie Lombard, RN  Outcome: Ongoing  9/18/2021 0137 by Mehreen Bernabe RN  Outcome: Ongoing  Goal: Control of chronic pain  Description: Control of chronic pain  Outcome: Ongoing     Problem: IP BALANCE  Goal: LTG - Patient will maintain balance to allow for safe/functional mobility  9/18/2021 1024 by Buddie Lombard, RN  Outcome: Ongoing  9/18/2021 0137 by Mehreen Bernabe RN  Outcome: Ongoing     Problem: Breathing Pattern - Ineffective:  Goal: Ability to achieve and maintain a regular respiratory rate will improve  Description: Ability to achieve and maintain a regular respiratory rate will improve  9/18/2021 1024 by Buddie Lombard, RN  Outcome: Ongoing  9/18/2021 0137 by Mehreen Bernabe RN  Outcome: Ongoing   Patient is a fall risk during this admission. Fall risk assessment was performed. Patient is absent of falls. Bed is in the lowest position. Wheels on the bed are locked. Call light and bed side table are within reach. Clutter is removed. Patient was educated to call out when needing assistance or wanting to get out of bed. Patient offered toileting assistance during rounding. Hourly rounds have been performed. Pt medicated with pain medication prn. Assessed all pain characteristics including level, type, location, frequency, and onset.   Non-pharmacologic interventions offered to pt as well. Pt states pain is tolerable at this time. Will continue to monitor  Patients respiratory status stable at this time. No signs or symptoms of distress noted. Respirations non-labored and regular. Nasal canula 02 in place as needed to maintain sp02>90% or per md order. Continuous SpO2 monitoring in place.

## 2021-09-18 NOTE — PLAN OF CARE
Problem: Falls - Risk of:  Goal: Will remain free from falls  Description: Will remain free from falls  Outcome: Ongoing     Problem: Falls - Risk of:  Goal: Absence of physical injury  Description: Absence of physical injury  Outcome: Ongoing     Problem: Pain:  Goal: Pain level will decrease  Description: Pain level will decrease  Outcome: Ongoing     Problem: Pain:  Goal: Control of acute pain  Description: Control of acute pain  Outcome: Ongoing     Problem: IP BALANCE  Goal: LTG - Patient will maintain balance to allow for safe/functional mobility  Outcome: Ongoing     Problem: Breathing Pattern - Ineffective:  Goal: Ability to achieve and maintain a regular respiratory rate will improve  Description: Ability to achieve and maintain a regular respiratory rate will improve  Outcome: Ongoing

## 2021-09-18 NOTE — PROGRESS NOTES
Occupational Therapy  Facility/Department: [facility] PROGRESSIVE CARE  Daily Treatment Note  NAME: Dulce Maria Benedict  : 1960  MRN: [id number]    Date of Service: 2021    Discharge Recommendations:  Patient would benefit from continued therapy after discharge       Assessment   Performance deficits / Impairments: Decreased functional mobility ; Decreased safe awareness;Decreased balance;Decreased ADL status; Decreased endurance;Decreased high-level IADLs;Decreased coordination  Assessment: Pt progressign towards goals and tolerating treatment well. Pt would benefit from continued skilled OT services to address deficits in activity to[place], strength, balance, and safety awareness, all limiting safe return home to [facility]. Prognosis: Fair  Decision Making: Medium Complexity  REQUIRES OT FOLLOW UP: Yes  Activity Tolerance  Activity Tolerance: Patient Tolerated treatment well  Activity Tolerance: fair minus/fair  Safety Devices  Safety Devices in place: Yes  Type of devices: Nurse notified; Left in bed;Call light within reach; All fall risk precautions in place         Patient Diagnosis(es): There were no encounter diagnoses. has a past medical history of Arthralgia, Cervical myelopathy ([address] DDD (degenerative disc disease), lumbar, Lumbar disc herniation, Retrolisthesis, and Spondylolisthesis of lumbar region. has a past surgical history that includes Cholecystectomy; back surgery (2018); Neck surgery; cervical fusion (N/A, 2021); CT NEEDLE BIOPSY LUNG PERCUTANEOUS (2021); CT GUIDED CHEST TUBE (2021); and CT GUIDED CHEST TUBE (2021). Restrictions  Restrictions/Precautions  Restrictions/Precautions: General Precautions, Fall Risk  Position Activity Restriction  Other position/activity restrictions:  Up with assist, chest tube on R, RUE IV, alarms  Subjective   General  Chart Reviewed: Yes  Patient assessed for rehabilitation services?: Yes  Response to previous treatment: Patient with no complaints from previous session  Family / Caregiver Present: No  Subjective  Subjective: Pt pleasant and cooperative  General Comment  Comments: RN zhen'celeste therapy on today's date   Pain Assessment  Pain Assessment: 0-10  Pain Level: 6  Pain Type: Acute pain  Pain Location: Chest  Vital Signs  Patient Currently in Pain: Yes  Patient Observation  Observations: Pt refused to wear 02 during session; however sats at 94-96%   Orientation  Orientation  Overall Orientation Status: Within Functional Limits  Objective    ADL  Feeding: Setup  Grooming: Setup;Contact guard assistance  UE Bathing: Setup;Contact guard assistance  LE Bathing: Setup;Contact guard assistance  UE Dressing: Setup;Minimal assistance  LE Dressing: Setup;Contact guard assistance        Balance  Sitting Balance: Independent  Bed mobility  Rolling to Left: Modified independent  Rolling to Right: Modified independent  Supine to Sit: Modified independent  Sit to Supine: Modified independent     Perception  Overall Perceptual Status: WFL              Type of ROM/Therapeutic Exercise  Comment:  (Refused HEP for R UE due to pain from chest tubes)  Exercises  Horizontal ABduction: 2x10  Horizontal ADduction: 2x10  Elbow Flexion: 2x10  Elbow Extension: 2x10  LUE AROM (degrees)  LUE AROM : WFL  RUE AROM (degrees)  RUE AROM : WFL                 Plan   Plan  Times per week: 4-5x/week 1x/day as jason  Current Treatment Recommendations: Endurance Training, Strengthening, Neuromuscular Re-education, Equipment Evaluation, Education, & procurement, Self-Care / ADL, Pain Management, Balance Training, Functional Mobility Training, Safety Education & Training, Positioning, Home Management Training, Patient/Caregiver Education & Training  G-Code     OutComes Score                                                  AM-PAC Score        AM-PAC Inpatient Daily Activity Raw Score: 20 (09/18/21 1253)  AM-PAC Inpatient ADL T-Scale Score : 42.03 (09/18/21 1253)  ADL Inpatient CMS 0-100% Score: 38.32 (09/18/21 1253)  ADL Inpatient CMS G-Code Modifier : CJ (09/18/21 1253)    Goals  Short term goals  Time Frame for Short term goals: by discharge, pt to demo  Short term goal 1: bed mob tasks with use of rail as needed to MOD I. Short term goal 2: I with BUE HEP with use of handouts as needed to maintain strength. Short term goal 3: total body ADL tasks with use of AE/AD and grab bar as needed to SUP. Short term goal 4: ADL transfers and functional mob with AD as needed to SUP. Short term goal 5: standing to SBA and AD as needed jason > 8 mins as able to reduce falls in function. Long term goals  Long term goal 1: Pt/caregivers to be I with EC/WS and fall prevention tech and DME/AE and AD recommendations with handouts. Patient Goals   Patient goals : Get out of here!        Therapy Time   Individual Concurrent Group Co-treatment   Time In  [address] [place] [address]         Time Out  1014         Minutes  [address] [place] [address]

## 2021-09-18 NOTE — PROGRESS NOTES
Subjective:     Follow-up COPD  No fever no chills mild shortness of breath no cough no wheezing  ROS  No fever no chills no GI/ complaints no cardiac complaints, no TIA no bleeding no headache no sore throat no skin lesions no polyuria no polydipsia no hypoglycemia, still complaining of low back pain which is chronic and pain around the chest tube site better with pain meds  physical exam  General Appearance: in no acute distress and alert  Skin: warm and dry, no rash or erythema  Head: normocephalic and atraumatic  Eyes: pupils equal, round, and reactive to light, conjunctivae normal and sclera anicteric  Neck: neck supple and non tender without mass   Pulmonary/Chest: clear to auscultation bilaterally- no wheezes, rales or rhonchi, normal air movement, no respiratory distress  Cardiovascular: normal rate, regular rhythm, normal S1 and S2, no gallops, intact distal pulses and no carotid bruits  Abdomen: soft, non-tender, non-distended, normal bowel sounds, no masses or organomegaly  Extremities: no edema and good pulses no Homans' sign    Neurologic: Alert oriented x3 with no focal deficit    /73   Pulse 71   Temp 98.6 °F (37 °C) (Oral)   Resp 16   Ht 5' 8\" (1.727 m)   Wt 139 lb 14.4 oz (63.5 kg)   SpO2 98%   BMI 21.27 kg/m²     CBC:   Lab Results   Component Value Date    WBC 5.9 09/17/2021    RBC 3.95 09/17/2021    HGB 12.3 09/17/2021    HCT 38.2 09/17/2021    MCV 96.7 09/17/2021    MCH 31.1 09/17/2021    MCHC 32.2 09/17/2021    RDW 13.1 09/17/2021     09/17/2021    MPV 9.3 09/17/2021     BMP:    Lab Results   Component Value Date     09/17/2021    K 4.2 09/17/2021     09/17/2021    CO2 30 09/17/2021    BUN 11 09/17/2021    CREATININE 0.82 09/17/2021    CALCIUM 8.6 09/17/2021    GFRAA >60 09/17/2021    LABGLOM >60 09/17/2021    GLUCOSE 100 09/17/2021        Assessment:  Patient Active Problem List   Diagnosis    Cervical spondylosis with myelopathy    Postprocedural pneumothorax    Pneumothorax     Postprocedure heart rate sided pneumothorax  Lung nodules  COPD  Chronic smoker smoking cessation advised  Subcutaneous emphysema  Iron deficiency anemia we will start iron supplement will need GI work-up check stools for occult blood  Plan:    Meds labs reviewed continue with DVT prophylaxis pain control continue with the chest tube continue with the bronchodilators see orders      Carl Salgado MD MD  2:17 PM

## 2021-09-18 NOTE — PROGRESS NOTES
Air leak noted to posterior #2 chest tube. [facility] CXR results show catheters in place.

## 2021-09-19 ENCOUNTER — APPOINTMENT (OUTPATIENT)
Dept: GENERAL RADIOLOGY | Age: 61
DRG: 200 | End: 2021-09-19
Attending: INTERNAL MEDICINE
Payer: COMMERCIAL

## 2021-09-19 LAB
ABSOLUTE EOS #: 0.11 K/UL (ref 0–0.44)
ABSOLUTE IMMATURE GRANULOCYTE: 0.07 K/UL (ref 0–0.3)
ABSOLUTE LYMPH #: 0.88 K/UL (ref 1.1–3.7)
ABSOLUTE MONO #: 0.97 K/UL (ref 0.1–1.2)
ANION GAP SERPL CALCULATED.3IONS-SCNC: 10 MMOL/L (ref 9–17)
BASOPHILS # BLD: 0 % (ref 0–2)
BASOPHILS ABSOLUTE: 0.03 K/UL (ref 0–0.2)
BUN BLDV-MCNC: 15 MG/DL (ref 8–23)
BUN/CREAT BLD: 22 (ref 9–20)
CALCIUM SERPL-MCNC: 9.2 MG/DL (ref 8.6–10.4)
CHLORIDE BLD-SCNC: 102 MMOL/L (ref 98–107)
CO2: 30 MMOL/L (ref 20–31)
CREAT SERPL-MCNC: 0.67 MG/DL (ref 0.7–1.2)
DIFFERENTIAL TYPE: ABNORMAL
EOSINOPHILS RELATIVE PERCENT: 1 % (ref 1–4)
GFR AFRICAN AMERICAN: >60 ML/MIN
GFR NON-AFRICAN AMERICAN: >60 ML/MIN
GFR SERPL CREATININE-BSD FRML MDRD: ABNORMAL ML/MIN/{1.73_M2}
GFR SERPL CREATININE-BSD FRML MDRD: ABNORMAL ML/MIN/{1.73_M2}
GLUCOSE BLD-MCNC: 108 MG/DL (ref 70–99)
HCT VFR BLD CALC: 40 % (ref 40.7–50.3)
HEMOGLOBIN: 12.9 G/DL (ref 13–17)
IMMATURE GRANULOCYTES: 1 %
LYMPHOCYTES # BLD: 10 % (ref 24–43)
MCH RBC QN AUTO: 30.9 PG (ref 25.2–33.5)
MCHC RBC AUTO-ENTMCNC: 32.3 G/DL (ref 28.4–34.8)
MCV RBC AUTO: 95.7 FL (ref 82.6–102.9)
MONOCYTES # BLD: 11 % (ref 3–12)
NRBC AUTOMATED: 0 PER 100 WBC
PDW BLD-RTO: 12.8 % (ref 11.8–14.4)
PLATELET # BLD: 277 K/UL (ref 138–453)
PLATELET ESTIMATE: ABNORMAL
PMV BLD AUTO: 9.5 FL (ref 8.1–13.5)
POTASSIUM SERPL-SCNC: 4.5 MMOL/L (ref 3.7–5.3)
RBC # BLD: 4.18 M/UL (ref 4.21–5.77)
RBC # BLD: ABNORMAL 10*6/UL
SEG NEUTROPHILS: 77 % (ref 36–65)
SEGMENTED NEUTROPHILS ABSOLUTE COUNT: 6.45 K/UL (ref 1.5–8.1)
SODIUM BLD-SCNC: 142 MMOL/L (ref 135–144)
WBC # BLD: 8.5 K/UL (ref 3.5–11.3)
WBC # BLD: ABNORMAL 10*3/UL

## 2021-09-19 PROCEDURE — 71045 X-RAY EXAM CHEST 1 VIEW: CPT

## 2021-09-19 PROCEDURE — 2060000000 HC ICU INTERMEDIATE R&B

## 2021-09-19 PROCEDURE — 6370000000 HC RX 637 (ALT 250 FOR IP): Performed by: NURSE PRACTITIONER

## 2021-09-19 PROCEDURE — 97110 THERAPEUTIC EXERCISES: CPT

## 2021-09-19 PROCEDURE — 94761 N-INVAS EAR/PLS OXIMETRY MLT: CPT

## 2021-09-19 PROCEDURE — 97530 THERAPEUTIC ACTIVITIES: CPT

## 2021-09-19 PROCEDURE — 6370000000 HC RX 637 (ALT 250 FOR IP): Performed by: INTERNAL MEDICINE

## 2021-09-19 PROCEDURE — 94640 AIRWAY INHALATION TREATMENT: CPT

## 2021-09-19 PROCEDURE — 6360000002 HC RX W HCPCS: Performed by: INTERNAL MEDICINE

## 2021-09-19 PROCEDURE — 85025 COMPLETE CBC W/AUTO DIFF WBC: CPT

## 2021-09-19 PROCEDURE — 2580000003 HC RX 258: Performed by: NURSE PRACTITIONER

## 2021-09-19 PROCEDURE — 80048 BASIC METABOLIC PNL TOTAL CA: CPT

## 2021-09-19 PROCEDURE — 2700000000 HC OXYGEN THERAPY PER DAY

## 2021-09-19 PROCEDURE — 97116 GAIT TRAINING THERAPY: CPT

## 2021-09-19 PROCEDURE — 36415 COLL VENOUS BLD VENIPUNCTURE: CPT

## 2021-09-19 PROCEDURE — 6360000002 HC RX W HCPCS: Performed by: NURSE PRACTITIONER

## 2021-09-19 RX ORDER — SENNA PLUS 8.6 MG/1
2 TABLET ORAL NIGHTLY
Status: DISCONTINUED | OUTPATIENT
Start: 2021-09-19 | End: 2021-09-22 | Stop reason: HOSPADM

## 2021-09-19 RX ORDER — DOCUSATE SODIUM 100 MG/1
100 CAPSULE, LIQUID FILLED ORAL 2 TIMES DAILY
Status: DISCONTINUED | OUTPATIENT
Start: 2021-09-19 | End: 2021-09-22 | Stop reason: HOSPADM

## 2021-09-19 RX ADMIN — OXYCODONE AND ACETAMINOPHEN 1 TABLET: 10; 325 TABLET ORAL at 10:26

## 2021-09-19 RX ADMIN — SENNOSIDES 17.2 MG: 8.6 TABLET, FILM COATED ORAL at 19:37

## 2021-09-19 RX ADMIN — FERROUS SULFATE TAB EC 325 MG (65 MG FE EQUIVALENT) 325 MG: 325 (65 FE) TABLET DELAYED RESPONSE at 17:10

## 2021-09-19 RX ADMIN — CYCLOBENZAPRINE 10 MG: 10 TABLET, FILM COATED ORAL at 21:38

## 2021-09-19 RX ADMIN — SODIUM CHLORIDE, PRESERVATIVE FREE 10 ML: 5 INJECTION INTRAVENOUS at 09:15

## 2021-09-19 RX ADMIN — DOCUSATE SODIUM 100 MG: 100 CAPSULE, LIQUID FILLED ORAL at 19:40

## 2021-09-19 RX ADMIN — HYDROMORPHONE HYDROCHLORIDE 0.5 MG: 1 INJECTION, SOLUTION INTRAMUSCULAR; INTRAVENOUS; SUBCUTANEOUS at 01:14

## 2021-09-19 RX ADMIN — OXYCODONE AND ACETAMINOPHEN 1 TABLET: 10; 325 TABLET ORAL at 17:10

## 2021-09-19 RX ADMIN — GABAPENTIN 600 MG: 300 CAPSULE ORAL at 05:15

## 2021-09-19 RX ADMIN — TRAZODONE HYDROCHLORIDE 100 MG: 100 TABLET ORAL at 21:38

## 2021-09-19 RX ADMIN — HYDROMORPHONE HYDROCHLORIDE 0.5 MG: 1 INJECTION, SOLUTION INTRAMUSCULAR; INTRAVENOUS; SUBCUTANEOUS at 19:40

## 2021-09-19 RX ADMIN — GABAPENTIN 600 MG: 300 CAPSULE ORAL at 14:24

## 2021-09-19 RX ADMIN — GLYCOPYRROLATE AND FORMOTEROL FUMARATE 2 PUFF: 9; 4.8 AEROSOL, METERED RESPIRATORY (INHALATION) at 20:11

## 2021-09-19 RX ADMIN — GABAPENTIN 600 MG: 300 CAPSULE ORAL at 21:38

## 2021-09-19 RX ADMIN — OXYCODONE AND ACETAMINOPHEN 1 TABLET: 10; 325 TABLET ORAL at 03:33

## 2021-09-19 RX ADMIN — HYDROMORPHONE HYDROCHLORIDE 0.5 MG: 1 INJECTION, SOLUTION INTRAMUSCULAR; INTRAVENOUS; SUBCUTANEOUS at 05:16

## 2021-09-19 RX ADMIN — FERROUS SULFATE TAB EC 325 MG (65 MG FE EQUIVALENT) 325 MG: 325 (65 FE) TABLET DELAYED RESPONSE at 09:14

## 2021-09-19 RX ADMIN — SODIUM CHLORIDE, PRESERVATIVE FREE 10 ML: 5 INJECTION INTRAVENOUS at 21:00

## 2021-09-19 RX ADMIN — CYCLOBENZAPRINE 10 MG: 10 TABLET, FILM COATED ORAL at 11:48

## 2021-09-19 RX ADMIN — GLYCOPYRROLATE AND FORMOTEROL FUMARATE 2 PUFF: 9; 4.8 AEROSOL, METERED RESPIRATORY (INHALATION) at 09:18

## 2021-09-19 RX ADMIN — DOCUSATE SODIUM 100 MG: 100 CAPSULE, LIQUID FILLED ORAL at 14:24

## 2021-09-19 RX ADMIN — ENOXAPARIN SODIUM 40 MG: 40 INJECTION SUBCUTANEOUS at 09:14

## 2021-09-19 ASSESSMENT — PAIN DESCRIPTION - PROGRESSION
CLINICAL_PROGRESSION: GRADUALLY IMPROVING

## 2021-09-19 ASSESSMENT — PAIN DESCRIPTION - ONSET: ONSET: GRADUAL

## 2021-09-19 ASSESSMENT — PAIN SCALES - GENERAL
PAINLEVEL_OUTOF10: 7
PAINLEVEL_OUTOF10: 7
PAINLEVEL_OUTOF10: 9
PAINLEVEL_OUTOF10: 7
PAINLEVEL_OUTOF10: 6
PAINLEVEL_OUTOF10: 9
PAINLEVEL_OUTOF10: 7

## 2021-09-19 ASSESSMENT — PAIN DESCRIPTION - PAIN TYPE
TYPE: ACUTE PAIN
TYPE: ACUTE PAIN

## 2021-09-19 ASSESSMENT — PAIN - FUNCTIONAL ASSESSMENT: PAIN_FUNCTIONAL_ASSESSMENT: PREVENTS OR INTERFERES SOME ACTIVE ACTIVITIES AND ADLS

## 2021-09-19 ASSESSMENT — PAIN DESCRIPTION - ORIENTATION
ORIENTATION: RIGHT
ORIENTATION: RIGHT

## 2021-09-19 ASSESSMENT — PAIN DESCRIPTION - LOCATION
LOCATION: CHEST
LOCATION: CHEST

## 2021-09-19 ASSESSMENT — PAIN DESCRIPTION - DESCRIPTORS: DESCRIPTORS: ACHING;DISCOMFORT

## 2021-09-19 NOTE — PROGRESS NOTES
Physical Therapy  Facility/Department: [facility] PROGRESSIVE CARE  Daily Treatment Note  NAME: Aiden Savage  : 1960  MRN: [id number]    Date of Service: 2021    Discharge Recommendations:  Patient would benefit from continued therapy after discharge    Assessment   Body structures, Functions, Activity limitations: Decreased functional mobility ; Decreased ADL status; Decreased strength;Decreased safe awareness;Decreased endurance;Decreased balance  Assessment: Pt with minimal deficits noted in bed mobility, transfers, ambulation, balance, and endurance this session. Pt requires continued PT to maximize independence with functional mobility, balance, safety awareness & activity tolerance. Pt would benefit from additional therapy at time of discharge. Please refer to the AM-PAC score for current functional status. Decision Making: Medium Complexity  Clinical Presentation: evolving  REQUIRES PT FOLLOW UP: Yes  Activity Tolerance  Activity Tolerance: Patient limited by endurance; Patient limited by pain     Patient Diagnosis(es): There were no encounter diagnoses. has a past medical history of Arthralgia, Cervical myelopathy ([address] DDD (degenerative disc disease), lumbar, Lumbar disc herniation, Retrolisthesis, and Spondylolisthesis of lumbar region. has a past surgical history that includes Cholecystectomy; back surgery (2018); Neck surgery; cervical fusion (N/A, 2021); CT NEEDLE BIOPSY LUNG PERCUTANEOUS (2021); CT GUIDED CHEST TUBE (2021); and CT GUIDED CHEST TUBE (2021). Restrictions  Restrictions/Precautions  Restrictions/Precautions: General Precautions, Fall Risk  Position Activity Restriction  Other position/activity restrictions: Up with assist, chest tube on R, RUE IV, alarms  Subjective   General  Chart Reviewed: Yes  Additional Pertinent Hx: COPD, chronic smoker, cervical spondylosis with myelopathy  Subjective  Subjective:  With encouragement pt  agreed  to PT session  General Comment  Comments: RN mirian PT  Pain Screening  Patient Currently in Pain: Yes  Pain Assessment  Pain Assessment: 0-10  Pain Level: 6  Pain Type: Acute pain  Pain Location: Chest  Pain Orientation: Right  Vital Signs  Patient Currently in Pain: Yes       Orientation  Orientation  Overall Orientation Status: Within Normal Limits  Objective   Bed mobility  Rolling: Stand by assistance  Supine to sit: Stand by assistance  Scooting: Stand by assistance  Transfers  Sit to Stand: Contact guard assistance  Stand to sit: Contact guard assistance (slightly unsteady with initial stand as pt is impulsive)  Bed to Chair: Minimal assistance (for line management)  Stand Pivot Transfers: Contact guard assistance  Lateral Transfers: Contact guard assistance  Ambulation  Ambulation?: Yes  More Ambulation?: No  Ambulation 1  Surface: level tile  Device: Rolling Walker  Other Apparatus: O2  Assistance: Contact guard assistance  Quality of Gait: steady with rw  Gait Deviations: Slow Lili;Decreased step length;Decreased step height  Distance: 20ft  Comments: Pt continues to be impulsive with all mobility  Stairs/Curb  Stairs?: No     Balance  Posture: Good  Sitting - Static: Good  Sitting - Dynamic: Good  Standing - Static: Good;-  Standing - Dynamic: Fair  Exercises  Sit to stands x 3  EOB sitting x 15 min  Ed pt on benefits of physical therapy and  importance of being up & OOB to regain strength, & prevention of sedentary complications, circulation ex's,  & optimal breathing techniques      AM-PAC Score     AM-PAC Inpatient Mobility without Stair Climbing Raw Score : 18 (09/19/21 1009)  AM-PAC Inpatient without Stair Climbing T-Scale Score : 51.97 (09/19/21 1009)  Mobility Inpatient CMS 0-100% Score: 23.26 (09/19/21 1009)  Mobility Inpatient without Stair CMS G-Code Modifier : CJ (09/19/21 1009)       Goals  Short term goals  Time Frame for Short term goals: 12 visits  Short term goal 1:  Inc bed-mobility & transfers to independent to enable pt to safely get in/OOB & chair  Short term goal 2: Inc gait to amb 350ft or > indep to enable pt to return to previous level of independence  Short term goal 3: Pt able to go up/down 6 steps with one rail indep  Short term goal 4:  Inc standing balance to good with device to facilitate pt independence for performance of ADL's & functional mobility, & reduce fall risk  Short term goal 5: Ed pt on home ex's, safety & energy principles, fall prevention, & issue written home program    Plan    Plan  Times per week: 1-2x/D, 5-6D/week  Current Treatment Recommendations: Strengthening, Balance Training, Functional Mobility Training, Transfer Training, Endurance Training, ADL/Self-care Training  Safety Devices  Type of devices: Nurse notified, Left in chair, Gait belt, Chair alarm in place, Call light within reach, All fall risk precautions in place     Therapy Time   Individual Concurrent Group Co-treatment   Time In  0929         Time Out  1008         Minutes  [place], [place]

## 2021-09-19 NOTE — PROGRESS NOTES
Subjective:     Follow-up pneumothorax  Still having chest tubes denies any shortness of breath complains of pain at the chest tube site chest x-ray reviewed  ROS  No fever no chills no GI/ complaints except constipation, no cardiac complaints, no TIA no bleeding no headache no sore throat no skin lesions, no polyuria no polydipsia no hypoglycemia no fatigue  physical exam  General Appearance: in no acute distress and alert  Skin: warm and dry, no rash or erythema  Head: normocephalic and atraumatic  Eyes: pupils equal, round, and reactive to light, conjunctivae normal and sclera anicteric    Neck: neck supple and non tender without mass   Pulmonary/Chest: clear to auscultation bilaterally- no wheezes, rales or rhonchi, normal air movement, no respiratory distress  Cardiovascular: normal rate, regular rhythm, normal S1 and S2, no gallops, intact distal pulses and no carotid bruits  Abdomen: soft, non-tender, non-distended, normal bowel sounds, no masses or organomegaly  Extremities: no edema and good pulses no Homans' sign  Neurologic: Alert oriented x3 with no focal deficit    /68   Pulse 74   Temp 99 °F (37.2 °C) (Oral)   Resp 16   Ht 5' 8\" (1.727 m)   Wt 135 lb 5 oz (61.4 kg)   SpO2 98%   BMI 20.57 kg/m²     CBC:   Lab Results   Component Value Date    WBC 8.5 09/19/2021    RBC 4.18 09/19/2021    HGB 12.9 09/19/2021    HCT 40.0 09/19/2021    MCV 95.7 09/19/2021    MCH 30.9 09/19/2021    MCHC 32.3 09/19/2021    RDW 12.8 09/19/2021     09/19/2021    MPV 9.5 09/19/2021     BMP:    Lab Results   Component Value Date     09/19/2021    K 4.5 09/19/2021     09/19/2021    CO2 30 09/19/2021    BUN 15 09/19/2021    CREATININE 0.67 09/19/2021    CALCIUM 9.2 09/19/2021    GFRAA >60 09/19/2021    LABGLOM >60 09/19/2021    GLUCOSE 108 09/19/2021        Assessment:  Patient Active Problem List   Diagnosis    Cervical spondylosis with myelopathy    Postprocedural pneumothorax    Pneumothorax Postprocedure right pneumothorax  COPD  Chronic smoker smoking cessation advised  Iron deficiency anemia on iron supplement work-up as an outpatient  Slow transit constipation  Plan:    Meds labs reviewed continue with DVT prophylaxis we will add Colace and Senokot with chest x-ray not improved by tomorrow we will ask cardiovascular surgery to see      Bernabe Aldana MD MD  2:11 PM

## 2021-09-19 NOTE — PROGRESS NOTES
Pulmonary Critical Care Progress Note  \     Patient seen for the follow up of right upper lobe nodule, COPD, Postprocedural pneumothorax     Subjective:  Patient had additional rt chest tube placed on 9/17. There are now two chest tubes on the rt, both to suction    Examination:  Vitals: /88   Pulse 83   Temp 97.7 °F (36.5 °C) (Oral)   Resp 16   Ht 5' 8\" (1.727 m)   Wt 135 lb 5 oz (61.4 kg)   SpO2 96%   BMI 20.57 kg/m²   General appearance: alert and cooperative with exam, resting in bed  Neck: No JVD  Lungs: fair air exchange with diminished bases, crackles. Positive subcutaneous emphysema right chest neck and extending to entire rt side of posterior chest wall, there is bubbling in chest tube #2 only. 97% on 2 l nc, CTs both currently to 20 cm wall suction  Heart: regular rate and rhythm, S1, S2 normal, no gallop  Abdomen: Soft, non tender, + BS  Extremities: no cyanosis or clubbing. No significant edema    LABs:  CBC:   Recent Labs     09/17/21  0503 09/19/21  0510   WBC 5.9 8.5   HGB 12.3* 12.9*   HCT 38.2* 40.0*    277     BMP:   Recent Labs     09/17/21  0503 09/19/21  0510    142   K 4.2 4.5   CO2 30 30   BUN 11 15   CREATININE 0.82 0.67*   LABGLOM >60 >60   GLUCOSE 100* 108*     PT/INR:   No results for input(s): PROTIME, INR in the last 72 hours. Radiology:  9/17/21  Worsening small right pneumothorax.   Subcutaneous emphysema right neck and chest.      CT chest 9/17/2021      Impression:  · Right pneumothorax  · Right upper lobe lung nodule s/p biopsy 9/14/2021  · COPD/active smoking, 70-pack-year history  · CXR reviewed 9--19-21 - rt PTX    Recommendations:  · Oxygen via nasal cannula at 2 L secondary to pneumothorax  · Place both chest tubes to 40 cm wall suction  · Continue Bevespi  · Pain control per primary team  · X-ray chest in am 9-20-21  · todays CXR with resolving PTX  · Labs: CBC and BMP in am  · DVT prophylaxis with low molecular weight heparin    Plan of car

## 2021-09-20 ENCOUNTER — APPOINTMENT (OUTPATIENT)
Dept: CT IMAGING | Age: 61
DRG: 200 | End: 2021-09-20
Attending: INTERNAL MEDICINE
Payer: COMMERCIAL

## 2021-09-20 ENCOUNTER — APPOINTMENT (OUTPATIENT)
Dept: GENERAL RADIOLOGY | Age: 61
DRG: 200 | End: 2021-09-20
Attending: INTERNAL MEDICINE
Payer: COMMERCIAL

## 2021-09-20 LAB
ABSOLUTE EOS #: 0.17 K/UL (ref 0–0.44)
ABSOLUTE IMMATURE GRANULOCYTE: 0.06 K/UL (ref 0–0.3)
ABSOLUTE LYMPH #: 1.19 K/UL (ref 1.1–3.7)
ABSOLUTE MONO #: 1.07 K/UL (ref 0.1–1.2)
ANION GAP SERPL CALCULATED.3IONS-SCNC: 7 MMOL/L (ref 9–17)
BASOPHILS # BLD: 1 % (ref 0–2)
BASOPHILS ABSOLUTE: 0.05 K/UL (ref 0–0.2)
BUN BLDV-MCNC: 19 MG/DL (ref 8–23)
BUN/CREAT BLD: 26 (ref 9–20)
CALCIUM SERPL-MCNC: 9 MG/DL (ref 8.6–10.4)
CHLORIDE BLD-SCNC: 102 MMOL/L (ref 98–107)
CO2: 31 MMOL/L (ref 20–31)
CREAT SERPL-MCNC: 0.72 MG/DL (ref 0.7–1.2)
DIFFERENTIAL TYPE: ABNORMAL
EOSINOPHILS RELATIVE PERCENT: 2 % (ref 1–4)
GFR AFRICAN AMERICAN: >60 ML/MIN
GFR NON-AFRICAN AMERICAN: >60 ML/MIN
GFR SERPL CREATININE-BSD FRML MDRD: ABNORMAL ML/MIN/{1.73_M2}
GFR SERPL CREATININE-BSD FRML MDRD: ABNORMAL ML/MIN/{1.73_M2}
GLUCOSE BLD-MCNC: 105 MG/DL (ref 70–99)
HCT VFR BLD CALC: 38.1 % (ref 40.7–50.3)
HEMOGLOBIN: 12.5 G/DL (ref 13–17)
IMMATURE GRANULOCYTES: 1 %
LYMPHOCYTES # BLD: 16 % (ref 24–43)
MCH RBC QN AUTO: 31.3 PG (ref 25.2–33.5)
MCHC RBC AUTO-ENTMCNC: 32.8 G/DL (ref 28.4–34.8)
MCV RBC AUTO: 95.5 FL (ref 82.6–102.9)
MONOCYTES # BLD: 15 % (ref 3–12)
NRBC AUTOMATED: 0 PER 100 WBC
PDW BLD-RTO: 12.7 % (ref 11.8–14.4)
PLATELET # BLD: 285 K/UL (ref 138–453)
PLATELET ESTIMATE: ABNORMAL
PMV BLD AUTO: 9.4 FL (ref 8.1–13.5)
POTASSIUM SERPL-SCNC: 4 MMOL/L (ref 3.7–5.3)
RBC # BLD: 3.99 M/UL (ref 4.21–5.77)
RBC # BLD: ABNORMAL 10*6/UL
SEG NEUTROPHILS: 65 % (ref 36–65)
SEGMENTED NEUTROPHILS ABSOLUTE COUNT: 4.72 K/UL (ref 1.5–8.1)
SODIUM BLD-SCNC: 140 MMOL/L (ref 135–144)
WBC # BLD: 7.3 K/UL (ref 3.5–11.3)
WBC # BLD: ABNORMAL 10*3/UL

## 2021-09-20 PROCEDURE — 71045 X-RAY EXAM CHEST 1 VIEW: CPT

## 2021-09-20 PROCEDURE — 2060000000 HC ICU INTERMEDIATE R&B

## 2021-09-20 PROCEDURE — 97530 THERAPEUTIC ACTIVITIES: CPT

## 2021-09-20 PROCEDURE — 6370000000 HC RX 637 (ALT 250 FOR IP): Performed by: INTERNAL MEDICINE

## 2021-09-20 PROCEDURE — 97116 GAIT TRAINING THERAPY: CPT

## 2021-09-20 PROCEDURE — 99024 POSTOP FOLLOW-UP VISIT: CPT | Performed by: NURSE PRACTITIONER

## 2021-09-20 PROCEDURE — 6360000002 HC RX W HCPCS: Performed by: INTERNAL MEDICINE

## 2021-09-20 PROCEDURE — 94760 N-INVAS EAR/PLS OXIMETRY 1: CPT

## 2021-09-20 PROCEDURE — 80048 BASIC METABOLIC PNL TOTAL CA: CPT

## 2021-09-20 PROCEDURE — 94640 AIRWAY INHALATION TREATMENT: CPT

## 2021-09-20 PROCEDURE — 85025 COMPLETE CBC W/AUTO DIFF WBC: CPT

## 2021-09-20 PROCEDURE — 97112 NEUROMUSCULAR REEDUCATION: CPT

## 2021-09-20 PROCEDURE — APPSS45 APP SPLIT SHARED TIME 31-45 MINUTES: Performed by: NURSE PRACTITIONER

## 2021-09-20 PROCEDURE — 6370000000 HC RX 637 (ALT 250 FOR IP): Performed by: NURSE PRACTITIONER

## 2021-09-20 PROCEDURE — 2580000003 HC RX 258: Performed by: NURSE PRACTITIONER

## 2021-09-20 PROCEDURE — 2700000000 HC OXYGEN THERAPY PER DAY

## 2021-09-20 PROCEDURE — 6360000002 HC RX W HCPCS: Performed by: NURSE PRACTITIONER

## 2021-09-20 PROCEDURE — 36415 COLL VENOUS BLD VENIPUNCTURE: CPT

## 2021-09-20 RX ADMIN — HYDROMORPHONE HYDROCHLORIDE 0.5 MG: 1 INJECTION, SOLUTION INTRAMUSCULAR; INTRAVENOUS; SUBCUTANEOUS at 20:57

## 2021-09-20 RX ADMIN — HYDROMORPHONE HYDROCHLORIDE 0.5 MG: 1 INJECTION, SOLUTION INTRAMUSCULAR; INTRAVENOUS; SUBCUTANEOUS at 04:34

## 2021-09-20 RX ADMIN — SENNOSIDES 17.2 MG: 8.6 TABLET, FILM COATED ORAL at 20:51

## 2021-09-20 RX ADMIN — OXYCODONE AND ACETAMINOPHEN 1 TABLET: 10; 325 TABLET ORAL at 18:27

## 2021-09-20 RX ADMIN — FERROUS SULFATE TAB EC 325 MG (65 MG FE EQUIVALENT) 325 MG: 325 (65 FE) TABLET DELAYED RESPONSE at 08:24

## 2021-09-20 RX ADMIN — SODIUM CHLORIDE, PRESERVATIVE FREE 10 ML: 5 INJECTION INTRAVENOUS at 20:58

## 2021-09-20 RX ADMIN — DOCUSATE SODIUM 100 MG: 100 CAPSULE, LIQUID FILLED ORAL at 20:51

## 2021-09-20 RX ADMIN — GABAPENTIN 600 MG: 300 CAPSULE ORAL at 13:51

## 2021-09-20 RX ADMIN — OXYCODONE AND ACETAMINOPHEN 1 TABLET: 10; 325 TABLET ORAL at 12:28

## 2021-09-20 RX ADMIN — TRAZODONE HYDROCHLORIDE 100 MG: 100 TABLET ORAL at 20:51

## 2021-09-20 RX ADMIN — DOCUSATE SODIUM 100 MG: 100 CAPSULE, LIQUID FILLED ORAL at 08:23

## 2021-09-20 RX ADMIN — HYDROMORPHONE HYDROCHLORIDE 0.5 MG: 1 INJECTION, SOLUTION INTRAMUSCULAR; INTRAVENOUS; SUBCUTANEOUS at 13:54

## 2021-09-20 RX ADMIN — OXYCODONE AND ACETAMINOPHEN 1 TABLET: 10; 325 TABLET ORAL at 00:38

## 2021-09-20 RX ADMIN — GLYCOPYRROLATE AND FORMOTEROL FUMARATE 2 PUFF: 9; 4.8 AEROSOL, METERED RESPIRATORY (INHALATION) at 09:35

## 2021-09-20 RX ADMIN — HYDROMORPHONE HYDROCHLORIDE 0.5 MG: 1 INJECTION, SOLUTION INTRAMUSCULAR; INTRAVENOUS; SUBCUTANEOUS at 08:52

## 2021-09-20 RX ADMIN — FERROUS SULFATE TAB EC 325 MG (65 MG FE EQUIVALENT) 325 MG: 325 (65 FE) TABLET DELAYED RESPONSE at 16:42

## 2021-09-20 RX ADMIN — CYCLOBENZAPRINE 10 MG: 10 TABLET, FILM COATED ORAL at 12:52

## 2021-09-20 RX ADMIN — GABAPENTIN 600 MG: 300 CAPSULE ORAL at 21:01

## 2021-09-20 RX ADMIN — OXYCODONE AND ACETAMINOPHEN 1 TABLET: 10; 325 TABLET ORAL at 06:37

## 2021-09-20 RX ADMIN — SODIUM CHLORIDE, PRESERVATIVE FREE 10 ML: 5 INJECTION INTRAVENOUS at 08:52

## 2021-09-20 RX ADMIN — ENOXAPARIN SODIUM 40 MG: 40 INJECTION SUBCUTANEOUS at 08:24

## 2021-09-20 RX ADMIN — GLYCOPYRROLATE AND FORMOTEROL FUMARATE 2 PUFF: 9; 4.8 AEROSOL, METERED RESPIRATORY (INHALATION) at 20:37

## 2021-09-20 ASSESSMENT — PAIN SCALES - GENERAL
PAINLEVEL_OUTOF10: 7
PAINLEVEL_OUTOF10: 8
PAINLEVEL_OUTOF10: 6
PAINLEVEL_OUTOF10: 7
PAINLEVEL_OUTOF10: 9
PAINLEVEL_OUTOF10: 0
PAINLEVEL_OUTOF10: 6
PAINLEVEL_OUTOF10: 0
PAINLEVEL_OUTOF10: 6
PAINLEVEL_OUTOF10: 8

## 2021-09-20 NOTE — CONSULTS
Laterality Date    BACK SURGERY  06/01/2018    CERVICAL FUSION N/A 5/24/2021    C 4-6 ACDF        HARDWARE REMOVAL        C6-7 PLATE - [employer]   NEURO MONITORING performed by Salomon Gutierrez MD at [facility]  9/14/2021    CT GUIDED CHEST TUBE 9/14/2021 [facility] CT SCAN    CT GUIDED CHEST TUBE  9/17/2021    CT GUIDED CHEST TUBE 9/17/2021 [facility] CT SCAN    CT NEEDLE BIOPSY LUNG PERCUTANEOUS  9/14/2021    CT NEEDLE BIOPSY LUNG PERCUTANEOUS 9/14/2021 [facility] CT SCAN    NECK SURGERY      cage and titanium disc       Medications:    docusate sodium  100 mg Oral BID    senna  2 tablet Oral Nightly    ferrous sulfate  325 mg Oral BID WC    traZODone  100 mg Oral Nightly    glycopyrrolate-formoterol  2 puff Inhalation BID    sodium chloride flush  5-40 mL IntraVENous 2 times per day    enoxaparin  40 mg SubCUTAneous Daily    gabapentin  600 mg Oral TID       Allergies:  Patient has no known allergies. Social History:       Social History     Tobacco Use   Smoking Status Current Every Day Smoker    Packs/day: 2.00    Years: 35.00    Pack years: 70.00    Types: Cigarettes   Smokeless Tobacco Never Used        Social History     Substance and Sexual Activity   Alcohol Use Yes    Comment: social        Social History     Substance and Sexual Activity   Drug Use Never      Marital status:       Family History:        Problem Relation Age of Onset    Asthma Mother     Other Father     Cancer Brother        PHYSICAL EXAM:  General: no acute distress, alert, oriented x 3, appropriate mood and affect, looks stated age, well nourished  VITALS:  /73   Pulse 63   Temp 97.8 °F (36.6 °C) (Oral)   Resp 16   Ht 5' 8\" (1.727 m)   Wt 141 lb 4.8 oz (64.1 kg)   SpO2 98%   BMI 21.48 kg/m²   Eyes:  Pupils equal round reactive to light and accomodation. Extraocular movement intact. Anicteric. Head/ENT:  Atraumatic, normocephalic. Hearing grossly intact. Neck: Supple, nontender.  Trachea midline. No thyromegaly. Lymphatics: cervical no lymphadenopathy, clavicular no lymphadenopathy  Lungs: diminished breath sounds right base and left base  Cardiovascular: Normal S1, S2,  No murmur, clicks or gallops  Abdomen:  Soft, non-tender, normal bowel sounds. No bruits, organomegaly or masses. Musculoskeletal:  5/5 muscle strength throughout. Extremities: negative  PV:  negative  Skin: No jaundice or eczema. Neurological: Normal sensation throughout. Moves all extremities to command  Psychiatric: Oriented to person place and time, no evidence of depression. Data:  CBC with Differential:    Lab Results   Component Value Date    WBC 7.3 09/20/2021    RBC 3.99 09/20/2021    HGB 12.5 09/20/2021    HCT 38.1 09/20/2021     09/20/2021    MCV 95.5 09/20/2021    MCH 31.3 09/20/2021    MCHC 32.8 09/20/2021    RDW 12.7 09/20/2021    LYMPHOPCT 16 09/20/2021    MONOPCT 15 09/20/2021    BASOPCT 1 09/20/2021    MONOSABS 1.07 09/20/2021    LYMPHSABS 1.19 09/20/2021    EOSABS 0.17 09/20/2021    BASOSABS 0.05 09/20/2021    DIFFTYPE NOT REPORTED 09/20/2021     CMP:    Lab Results   Component Value Date     09/20/2021    K 4.0 09/20/2021     09/20/2021    CO2 31 09/20/2021    BUN 19 09/20/2021    CREATININE 0.72 09/20/2021    GFRAA >60 09/20/2021    LABGLOM >60 09/20/2021    GLUCOSE 105 09/20/2021    CALCIUM 9.0 09/20/2021     PT/INR:    Lab Results   Component Value Date    PROTIME 13.3 09/15/2021    INR 1.0 09/15/2021       Imaging Studies:    CT: 9/17/2021  FINDINGS:   Mediastinum: The thyroid is within normal limits. Aortic and coronary   atherosclerosis. No pericardial or pleural effusion. The esophagus is   within normal limits. No mediastinal adenopathy. Lungs/pleura: Small to moderate right pneumothorax which appears to be   free-flowing. Moderate to severe emphysematous changes with scarring at the   lung apices.   Nodular opacity at the right apex which measures 18 x 11 mm is grossly stable. No airspace consolidations. Stable position of a right   apical pigtail chest tube. The most proximal side hole of the pigtail may   lie external to the pleura. Upper Abdomen: No acute abnormality. Soft Tissues/Bones: No acute osseous abnormality. Extensive right chest wall   and supraclavicular subcutaneous emphysema. Prior ACDF. CXR: 9/20/2021  FINDINGS:   Stable positioning of right pleural pigtail catheters. Normal heart size and   pulmonary vasculature. Small right apical pneumothorax. Subcutaneous gas   right lower neck and right chest wall. No effusion. Assessment & Plan:  Problem List:  Patient Active Problem List   Diagnosis    Cervical spondylosis with myelopathy    Postprocedural pneumothorax    Pneumothorax       Plan: Will discuss with pulmonology about possible removal of anterior chest tube. Keep remaining chest tube to suction. Continue to monitor chest x-rays daily. Patient encouraged to ambulate, use his IS, cough and deep breath. The above recommendations including medications and orders were discussed and agreed upon with Dr. Letty Malin the attending on service for the cardiothoracic surgery group today. On this date 9/20/2021 I have spent 45 minutes reviewing previous notes, test results and face to face with the patient discussing the diagnosis and importance of compliance with the treatment plan as well as documenting on the day of the visit. At least 50% of the time documented was spent with the patient to provide counseling and/or coordination of care.     Agree with the above  Plan for removal of anterior chest tube  CXR in the AM  Will discuss with pulmonary medicine    ADELAIDE Olson CNP, CNP  Phone: [phone]

## 2021-09-20 NOTE — CARE COORDINATION
Discharge planning    Patient chart reviewed. Patient admitted with right pneumothorax s/p right lung bx. Pulmonary following. He is on 2 liters NC. Now has 2 chest tubes to right lung both to suction at -40. Patient lives at home with spouse has walker in home, independent.      PULM 9/19  Impression:  · Right pneumothorax  · Right upper lobe lung nodule s/p biopsy 9/14/2021  · COPD/active smoking, 70-pack-year history  · CXR reviewed 9--19-21 - rt PTX     Recommendations:  · Oxygen via nasal cannula at 2 L secondary to pneumothorax  · Place both chest tubes to 40 cm wall suction  · X-ray chest in am 9-20-21  · todays CXR with resolving PTX  · Labs: CBC and BMP in am  · DVT prophylaxis with low molecular weight heparin

## 2021-09-20 NOTE — PLAN OF CARE
Problem: Falls - Risk of:  Goal: Will remain free from falls  Description: Will remain free from falls  Outcome: Ongoing     Problem: Falls - Risk of:  Goal: Absence of physical injury  Description: Absence of physical injury  Outcome: Ongoing     Problem: Pain:  Goal: Pain level will decrease  Description: Pain level will decrease  9/20/2021 1051 by Selma Sullivan RN  Outcome: Ongoing     Problem: Pain:  Goal: Control of acute pain  Description: Control of acute pain  9/20/2021 1051 by Selma Sullivan RN  Outcome: Ongoing     Problem: Pain:  Goal: Control of chronic pain  Description: Control of chronic pain  9/20/2021 1051 by Selma Sullivan RN  Outcome: Ongoing     Problem: IP BALANCE  Goal: LTG - Patient will maintain balance to allow for safe/functional mobility  Outcome: Ongoing     Problem: Breathing Pattern - Ineffective:  Goal: Ability to achieve and maintain a regular respiratory rate will improve  Description: Ability to achieve and maintain a regular respiratory rate will improve  Outcome: Ongoing

## 2021-09-20 NOTE — PROGRESS NOTES
Pulmonary Critical Care Progress Note  Carson Hunter MD     Patient seen for the follow up of right upper lobe nodule, COPD, Postprocedural pneumothorax     Subjective:  No significant overnight events noted. He is resting comfortably in bed, no distress. He denies shortness of breath, cough or chest pain. His chest tubes remain to suction at 40 cm. He has discomfort at chest tube insertion site. His chest tube continues to have an air leak. He is very frustrated with the lack of progress and his prolonged hospital stay. Examination:  Vitals: /73   Pulse 63   Temp 97.8 °F (36.6 °C) (Oral)   Resp 16   Ht 5' 8\" (1.727 m)   Wt 141 lb 4.8 oz (64.1 kg)   SpO2 98%   BMI 21.48 kg/m²   General appearance: alert and cooperative with exam, resting in bed  Neck: No JVD  Lungs: Moderate air exchange, positive crackles. Positive subcutaneous emphysema right chest and neck  Heart: regular rate and rhythm, S1, S2 normal, no gallop  Abdomen: Soft, non tender, + BS  Extremities: no cyanosis or clubbing. No significant edema    LABs:  CBC:   Recent Labs     09/19/21  0510 09/20/21  0505   WBC 8.5 7.3   HGB 12.9* 12.5*   HCT 40.0* 38.1*    285     BMP:   Recent Labs     09/19/21  0510 09/20/21  0505    140   K 4.5 4.0   CO2 30 31   BUN 15 19   CREATININE 0.67* 0.72   LABGLOM >60 >60   GLUCOSE 108* 105*     Radiology:  9/20/21  Small right apical pneumothorax. Stable subcutaneous gas. CT chest 9/17/2021      Impression:  · Right pneumothorax  · Right upper lobe lung nodule s/p biopsy 9/14/2021  · COPD/active smoking, 70-pack-year history    Recommendations:  · Oxygen via nasal cannula at 2 L secondary to pneumothorax  · Discussed with Kenan Maldonado CNP from cardiothoracic surgery. Plan is to remove anterior chest tube today and repeat x-ray chest and the a.m. to reevaluate need for manipulation of posterior chest tube or CT chest.  · Surgical pathology negative for malignancy.  Showing hyalinized granuloma.   · Continue Bevespi  · Pain control per primary team  · X-ray chest in am  · Labs: CBC and BMP in am  · DVT prophylaxis with low molecular weight heparin  · Discussed with RN  · Will follow with you    Electronically signed by     Darian Rhodes MD on 9/20/2021 at 12:53 PM  Pulmonary Critical Care and Sleep Medicine,  [facility]  Cell: [phone]  Office: [phone]

## 2021-09-20 NOTE — PROGRESS NOTES
Subjective:     Follow-up pneumothorax  No shortness of breath no tachypnea no wheezing no hypoxemia  ROS  Fever no chills no GI/ complaints except constipation, no TIA no bleeding no headache no sore throat no skin lesions no cardiac complaints, no polyuria no polydipsia  physical exam  General Appearance: in no acute distress and alert  Skin: warm and dry, no rash or erythema  Head: normocephalic and atraumatic  Eyes: pupils equal, round, and reactive to light, conjunctivae normal and sclera anicteric  Neck: neck supple and non tender without mass   Pulmonary/Chest: clear to auscultation bilaterally- no wheezes, rales or rhonchi, normal air movement, no respiratory distress prolonged expiratory phase  Cardiovascular: normal rate, regular rhythm, normal S1 and S2, no gallops, intact distal pulses and no carotid bruits  Abdomen: soft, non-tender, non-distended, normal bowel sounds, no masses or organomegaly  Extremities: no edema and good pulses no Homans    Neurologic: Alert oriented x3 with no focal deficit    /69   Pulse 71   Temp 98.4 °F (36.9 °C) (Oral)   Resp 16   Ht 5' 8\" (1.727 m)   Wt 141 lb 4.8 oz (64.1 kg)   SpO2 97%   BMI 21.48 kg/m²     CBC:   Lab Results   Component Value Date    WBC 7.3 09/20/2021    RBC 3.99 09/20/2021    HGB 12.5 09/20/2021    HCT 38.1 09/20/2021    MCV 95.5 09/20/2021    MCH 31.3 09/20/2021    MCHC 32.8 09/20/2021    RDW 12.7 09/20/2021     09/20/2021    MPV 9.4 09/20/2021     BMP:    Lab Results   Component Value Date     09/20/2021    K 4.0 09/20/2021     09/20/2021    CO2 31 09/20/2021    BUN 19 09/20/2021    CREATININE 0.72 09/20/2021    CALCIUM 9.0 09/20/2021    GFRAA >60 09/20/2021    LABGLOM >60 09/20/2021    GLUCOSE 105 09/20/2021        Assessment:  Patient Active Problem List   Diagnosis    Cervical spondylosis with myelopathy    Postprocedural pneumothorax    Pneumothorax     Postprocedure right pneumothorax 1 chest tube output chest tube still in  COPD  Chronic smoker smoking cessation advised  Iron deficiency anemia on iron supplements worked up as an outpatient  Slow transit constipation increase Colace if that does not work we will start Neeraj Barber in the morning  Plan:    Meds labs reviewed continue with DVT prophylaxis continue with the bronchodilators oxygen as needed ambulate see orders      Luisana Daniels MD MD  6:32 PM

## 2021-09-20 NOTE — PLAN OF CARE
Problem: Pain:  Goal: Pain level will decrease  Description: Pain level will decrease  Outcome: Ongoing     Problem: Pain:  Goal: Control of acute pain  Description: Control of acute pain  Outcome: Ongoing     Problem: Pain:  Goal: Control of chronic pain  Description: Control of chronic pain  Outcome: Ongoing    Pt medicated with pain medication prn. Assessed all pain characteristics including level, type, location, frequency, and onset. Non-pharmacologic interventions offered to pt as well. Pt states pain is tolerable at this time.  Will continue to monitor

## 2021-09-20 NOTE — PROGRESS NOTES
Spoke with IR regarding chest xray. IR recommends Cardiothoracic consult. Pulmonology NP rounding notified of recommendations. Consult placed for cardiothoracic surgery. Call placed to physician, awaiting call back.

## 2021-09-20 NOTE — PROGRESS NOTES
Anterior chest tube removed bedside without complication. Dressing to site. No questions at this time.

## 2021-09-21 ENCOUNTER — APPOINTMENT (OUTPATIENT)
Dept: GENERAL RADIOLOGY | Age: 61
DRG: 200 | End: 2021-09-21
Attending: INTERNAL MEDICINE
Payer: COMMERCIAL

## 2021-09-21 LAB
ABSOLUTE EOS #: 0.13 K/UL (ref 0–0.44)
ABSOLUTE IMMATURE GRANULOCYTE: 0.09 K/UL (ref 0–0.3)
ABSOLUTE LYMPH #: 1.07 K/UL (ref 1.1–3.7)
ABSOLUTE MONO #: 0.98 K/UL (ref 0.1–1.2)
BASOPHILS # BLD: 1 % (ref 0–2)
BASOPHILS ABSOLUTE: 0.05 K/UL (ref 0–0.2)
DIFFERENTIAL TYPE: ABNORMAL
EOSINOPHILS RELATIVE PERCENT: 2 % (ref 1–4)
HCT VFR BLD CALC: 41.2 % (ref 40.7–50.3)
HEMOGLOBIN: 13.7 G/DL (ref 13–17)
IMMATURE GRANULOCYTES: 1 %
LYMPHOCYTES # BLD: 13 % (ref 24–43)
MCH RBC QN AUTO: 32 PG (ref 25.2–33.5)
MCHC RBC AUTO-ENTMCNC: 33.3 G/DL (ref 28.4–34.8)
MCV RBC AUTO: 96.3 FL (ref 82.6–102.9)
MONOCYTES # BLD: 12 % (ref 3–12)
NRBC AUTOMATED: 0 PER 100 WBC
PDW BLD-RTO: 12.9 % (ref 11.8–14.4)
PLATELET # BLD: 280 K/UL (ref 138–453)
PLATELET ESTIMATE: ABNORMAL
PMV BLD AUTO: 9.4 FL (ref 8.1–13.5)
RBC # BLD: 4.28 M/UL (ref 4.21–5.77)
RBC # BLD: ABNORMAL 10*6/UL
SEG NEUTROPHILS: 71 % (ref 36–65)
SEGMENTED NEUTROPHILS ABSOLUTE COUNT: 5.95 K/UL (ref 1.5–8.1)
WBC # BLD: 8.3 K/UL (ref 3.5–11.3)
WBC # BLD: ABNORMAL 10*3/UL

## 2021-09-21 PROCEDURE — 97530 THERAPEUTIC ACTIVITIES: CPT

## 2021-09-21 PROCEDURE — 6360000002 HC RX W HCPCS: Performed by: NURSE PRACTITIONER

## 2021-09-21 PROCEDURE — 6370000000 HC RX 637 (ALT 250 FOR IP): Performed by: NURSE PRACTITIONER

## 2021-09-21 PROCEDURE — 36415 COLL VENOUS BLD VENIPUNCTURE: CPT

## 2021-09-21 PROCEDURE — 2060000000 HC ICU INTERMEDIATE R&B

## 2021-09-21 PROCEDURE — 6360000002 HC RX W HCPCS: Performed by: INTERNAL MEDICINE

## 2021-09-21 PROCEDURE — 71045 X-RAY EXAM CHEST 1 VIEW: CPT

## 2021-09-21 PROCEDURE — APPSS30 APP SPLIT SHARED TIME 16-30 MINUTES: Performed by: NURSE PRACTITIONER

## 2021-09-21 PROCEDURE — 85025 COMPLETE CBC W/AUTO DIFF WBC: CPT

## 2021-09-21 PROCEDURE — 2580000003 HC RX 258: Performed by: NURSE PRACTITIONER

## 2021-09-21 PROCEDURE — 6370000000 HC RX 637 (ALT 250 FOR IP): Performed by: INTERNAL MEDICINE

## 2021-09-21 PROCEDURE — 97116 GAIT TRAINING THERAPY: CPT

## 2021-09-21 PROCEDURE — 97112 NEUROMUSCULAR REEDUCATION: CPT

## 2021-09-21 PROCEDURE — 94640 AIRWAY INHALATION TREATMENT: CPT

## 2021-09-21 RX ADMIN — FERROUS SULFATE TAB EC 325 MG (65 MG FE EQUIVALENT) 325 MG: 325 (65 FE) TABLET DELAYED RESPONSE at 19:01

## 2021-09-21 RX ADMIN — SENNOSIDES 17.2 MG: 8.6 TABLET, FILM COATED ORAL at 19:30

## 2021-09-21 RX ADMIN — GABAPENTIN 600 MG: 300 CAPSULE ORAL at 14:45

## 2021-09-21 RX ADMIN — GABAPENTIN 600 MG: 300 CAPSULE ORAL at 06:27

## 2021-09-21 RX ADMIN — TRAZODONE HYDROCHLORIDE 100 MG: 100 TABLET ORAL at 19:30

## 2021-09-21 RX ADMIN — FERROUS SULFATE TAB EC 325 MG (65 MG FE EQUIVALENT) 325 MG: 325 (65 FE) TABLET DELAYED RESPONSE at 08:50

## 2021-09-21 RX ADMIN — OXYCODONE AND ACETAMINOPHEN 1 TABLET: 10; 325 TABLET ORAL at 03:12

## 2021-09-21 RX ADMIN — SODIUM CHLORIDE, PRESERVATIVE FREE 10 ML: 5 INJECTION INTRAVENOUS at 06:27

## 2021-09-21 RX ADMIN — GABAPENTIN 600 MG: 300 CAPSULE ORAL at 21:29

## 2021-09-21 RX ADMIN — DOCUSATE SODIUM 100 MG: 100 CAPSULE, LIQUID FILLED ORAL at 19:30

## 2021-09-21 RX ADMIN — SODIUM CHLORIDE, PRESERVATIVE FREE 10 ML: 5 INJECTION INTRAVENOUS at 08:50

## 2021-09-21 RX ADMIN — CYCLOBENZAPRINE 10 MG: 10 TABLET, FILM COATED ORAL at 19:30

## 2021-09-21 RX ADMIN — CYCLOBENZAPRINE 10 MG: 10 TABLET, FILM COATED ORAL at 08:56

## 2021-09-21 RX ADMIN — GLYCOPYRROLATE AND FORMOTEROL FUMARATE 2 PUFF: 9; 4.8 AEROSOL, METERED RESPIRATORY (INHALATION) at 19:41

## 2021-09-21 RX ADMIN — GLYCOPYRROLATE AND FORMOTEROL FUMARATE 2 PUFF: 9; 4.8 AEROSOL, METERED RESPIRATORY (INHALATION) at 08:14

## 2021-09-21 RX ADMIN — OXYCODONE AND ACETAMINOPHEN 1 TABLET: 10; 325 TABLET ORAL at 18:43

## 2021-09-21 RX ADMIN — SODIUM CHLORIDE, PRESERVATIVE FREE 10 ML: 5 INJECTION INTRAVENOUS at 19:30

## 2021-09-21 RX ADMIN — HYDROMORPHONE HYDROCHLORIDE 0.25 MG: 1 INJECTION, SOLUTION INTRAMUSCULAR; INTRAVENOUS; SUBCUTANEOUS at 15:11

## 2021-09-21 RX ADMIN — HYDROMORPHONE HYDROCHLORIDE 0.25 MG: 1 INJECTION, SOLUTION INTRAMUSCULAR; INTRAVENOUS; SUBCUTANEOUS at 21:38

## 2021-09-21 RX ADMIN — HYDROMORPHONE HYDROCHLORIDE 0.5 MG: 1 INJECTION, SOLUTION INTRAMUSCULAR; INTRAVENOUS; SUBCUTANEOUS at 06:27

## 2021-09-21 RX ADMIN — ENOXAPARIN SODIUM 40 MG: 40 INJECTION SUBCUTANEOUS at 08:51

## 2021-09-21 RX ADMIN — OXYCODONE AND ACETAMINOPHEN 1 TABLET: 10; 325 TABLET ORAL at 12:40

## 2021-09-21 RX ADMIN — LINACLOTIDE 145 MCG: 145 CAPSULE, GELATIN COATED ORAL at 06:30

## 2021-09-21 RX ADMIN — SODIUM CHLORIDE, PRESERVATIVE FREE 10 ML: 5 INJECTION INTRAVENOUS at 15:11

## 2021-09-21 ASSESSMENT — PAIN DESCRIPTION - PROGRESSION
CLINICAL_PROGRESSION: NOT CHANGED
CLINICAL_PROGRESSION: NOT CHANGED

## 2021-09-21 ASSESSMENT — PAIN DESCRIPTION - FREQUENCY
FREQUENCY: INTERMITTENT
FREQUENCY: INTERMITTENT

## 2021-09-21 ASSESSMENT — PAIN DESCRIPTION - DESCRIPTORS
DESCRIPTORS: ACHING;DISCOMFORT
DESCRIPTORS: ACHING;DISCOMFORT

## 2021-09-21 ASSESSMENT — PAIN SCALES - GENERAL
PAINLEVEL_OUTOF10: 3
PAINLEVEL_OUTOF10: 6
PAINLEVEL_OUTOF10: 7
PAINLEVEL_OUTOF10: 7
PAINLEVEL_OUTOF10: 6
PAINLEVEL_OUTOF10: 7
PAINLEVEL_OUTOF10: 5
PAINLEVEL_OUTOF10: 7

## 2021-09-21 ASSESSMENT — PAIN DESCRIPTION - ORIENTATION
ORIENTATION: RIGHT
ORIENTATION: RIGHT

## 2021-09-21 ASSESSMENT — PAIN DESCRIPTION - PAIN TYPE
TYPE: SURGICAL PAIN
TYPE: SURGICAL PAIN

## 2021-09-21 ASSESSMENT — PAIN DESCRIPTION - ONSET
ONSET: ON-GOING
ONSET: ON-GOING

## 2021-09-21 ASSESSMENT — PAIN - FUNCTIONAL ASSESSMENT
PAIN_FUNCTIONAL_ASSESSMENT: PREVENTS OR INTERFERES SOME ACTIVE ACTIVITIES AND ADLS
PAIN_FUNCTIONAL_ASSESSMENT: PREVENTS OR INTERFERES SOME ACTIVE ACTIVITIES AND ADLS

## 2021-09-21 ASSESSMENT — PAIN DESCRIPTION - LOCATION
LOCATION: BACK
LOCATION: BACK

## 2021-09-21 NOTE — PROGRESS NOTES
[facility]  Daily Progress Note      Subjective:  Mr. Sriram Canas feels well today with no acute complaints. Pain is controlled on current medication regimen. Denies chest pain or shortness of breath. States that he has been using his IS and deep breathing. Plan of care reviewed and questions answered. Physical Exam  Vital Signs: BP 93/60   Pulse 69   Temp 98.2 °F (36.8 °C) (Oral)   Resp 16   Ht 5' 8\" (1.727 m)   Wt 141 lb 9 oz (64.2 kg)   SpO2 97%   BMI 21.52 kg/m²  O2 Flow Rate (L/min): 2 L/min   Admit Weight: Weight: 143 lb (64.9 kg)   WEIGHTWeight: 141 lb 9 oz (64.2 kg)     General: alert and oriented to person, place and time, well-developed and well-nourished, in no acute distress. Heart: Normal S1 and S2.  Regular rhythm. No murmurs, gallops, or rubs. Pacing Wires: No   Lungs: clear to auscultation bilaterally and diminished breath sounds bibasilar Chest tubes: Yes, Air Leak: Yes,  1+  Abdomen: soft, non tender, non distended, BSx4  Extremities: negative  Wounds: clean and dry, healing appropriately. Scheduled Meds:    linaclotide  145 mcg Oral QAM AC    docusate sodium  100 mg Oral BID    senna  2 tablet Oral Nightly    ferrous sulfate  325 mg Oral BID WC    traZODone  100 mg Oral Nightly    glycopyrrolate-formoterol  2 puff Inhalation BID    sodium chloride flush  5-40 mL IntraVENous 2 times per day    enoxaparin  40 mg SubCUTAneous Daily    gabapentin  600 mg Oral TID     Continuous Infusions:    sodium chloride         Data:  CBC:   Recent Labs     09/19/21  0510 09/20/21  0505 09/21/21  0528   WBC 8.5 7.3 8.3   HGB 12.9* 12.5* 13.7   HCT 40.0* 38.1* 41.2   MCV 95.7 95.5 96.3    285 280     BMP:   Recent Labs     09/19/21  0510 09/20/21  0505    140   K 4.5 4.0    102   CO2 30 31   BUN 15 19   CREATININE 0.67* 0.72     PT/INR: No results for input(s): PROTIME, INR in the last 72 hours. APTT: No results for input(s):  APTT in the last 72 hours. Chest X-Ray: Awaiting for results/images    I/O:  I/O last 3 completed shifts:  In: -   Out: 80 [Urine:600; Chest Tube:9]    Assessment/Plan:      Diagnosis Date    Arthralgia     Cervical myelopathy (HCC)     DDD (degenerative disc disease), lumbar     Lumbar disc herniation     Retrolisthesis     Spondylolisthesis of lumbar region       Neuro: Intact   Lungs: clear, diminished in the bases   HD: VSS   Edema: none noted   GI: Active bowel sounds X4   : Good urine output     Oxygen as needed via nasal cannula to maintain SpO2 > 92%   Chest x-ray daily   Keep Chest Tubes to wall suction   Encourage incentive spirometry, and ambulation    Will review CXR in AM to decipher if possible chest tube manipulation needs to occur. The above recommendations including medications and orders were discussed and agreed upon with Dr. Bianca Zarate, the attending on service for the cardiothoracic surgery group today. Electronically signed by ADELAIDE Villasenor CNP on 9/21/2021 at 6:20 AM    On this date 9/21/2021 I have spent 24 minutes reviewing previous notes, test results and face to face with the patient discussing the diagnosis and importance of compliance with the treatment plan as well as documenting on the day of the visit. At least 50% of the time documented was spent with the patient to provide counseling and/or coordination of care. This note was created with the assistance of a speech-recognition program.  Although the intention is to generate a document that actually reflects the content of the visit, no guarantees can be provided that every mistake has been identified and corrected by editing. Note was updated later by me after  physical examination and  completion of the assessment.

## 2021-09-21 NOTE — PROGRESS NOTES
Subjective:     Follow-up pneumothorax  No fever no chills no cough no shortness of breath no tachypnea no wheezing no hypoxia  ROS  No fever no chills no GI/ complaints had bowel movement today, no cardiac complaints no TIA no bleeding no headache no sore throat no skin lesions except some seborrhea on the face no polyuria no polydipsia no hypoglycemia  physical exam  General Appearance: in no acute distress and alert  Skin: warm and dry, no rash or erythema  Head: normocephalic and atraumatic  Eyes: pupils equal, round, and reactive to light, conjunctivae normal and sclera anicteric    Neck: neck supple and non tender without mass   Pulmonary/Chest: clear to auscultation bilaterally- no wheezes, rales or rhonchi, normal air movement, no respiratory distress  Cardiovascular: normal rate, regular rhythm, normal S1 and S2, no gallops, intact distal pulses and no carotid bruits  Abdomen: soft, non-tender, non-distended, normal bowel sounds, no masses or organomegaly  Extremities: no edema and good pulses no Homans' sign  Neurologic: Alert oriented x3 with no focal deficit    /73   Pulse 79   Temp 98.4 °F (36.9 °C) (Oral)   Resp 16   Ht 5' 8\" (1.727 m)   Wt 141 lb 9 oz (64.2 kg)   SpO2 99%   BMI 21.52 kg/m²     CBC:   Lab Results   Component Value Date    WBC 8.3 09/21/2021    RBC 4.28 09/21/2021    HGB 13.7 09/21/2021    HCT 41.2 09/21/2021    MCV 96.3 09/21/2021    MCH 32.0 09/21/2021    MCHC 33.3 09/21/2021    RDW 12.9 09/21/2021     09/21/2021    MPV 9.4 09/21/2021     BMP:    Lab Results   Component Value Date     09/20/2021    K 4.0 09/20/2021     09/20/2021    CO2 31 09/20/2021    BUN 19 09/20/2021    CREATININE 0.72 09/20/2021    CALCIUM 9.0 09/20/2021    GFRAA >60 09/20/2021    LABGLOM >60 09/20/2021    GLUCOSE 105 09/20/2021        Assessment:  Patient Active Problem List   Diagnosis    Cervical spondylosis with myelopathy    Postprocedural pneumothorax    Pneumothorax Post procedure right pneumothorax  COPD continue with bronchodilators  Chronic smoker smoking cessation advised  Iron deficiency anemia on iron supplement work-up as an outpatient  Slow transit constipation resolved continue with Colace and Linzess  Chronic low back pain on pain meds through pain management plan:    Labs labs reviewed continue with DVT prophylaxis oxygen therapy pain control continue with Colace and Linzess see orders continue with iron supplements and smoking cessation advised      Barrington Vigil MD MD  3:52 PM

## 2021-09-21 NOTE — ADT AUTH CERT
Utilization Reviews       Pneumothorax - Care Day 5 (9/18/2021) by Mehran Silva RN       Review Status Review Entered   Completed 9/21/2021 11:31      Criteria Review      Care Day: 5 Care Date: 9/18/2021 Level of Care: Intermediate Care    Guideline Day 2    Level Of Care    (X) Floor    Clinical Status    (X) * Hypotension absent    ( ) * Pain absent or managed    (X) * Respiratory distress absent    (X) * Chest tube air leak absent    ( ) * CXR shows resolution or improvement of pneumothorax    9/21/2021 11:31 AM EDT by Sharlotte Sep      CXR 4520  A 2nd right-sided pigtail catheter has been placed.  No evident pneumothorax. CXR 1522  Pleural catheters remain in place   No pneumothorax    Activity    (X) Activity as tolerated    Routes    (X) Oral hydration    (X) Oral medications    (X) Usual diet    Interventions    (X) Possible chest tube    9/21/2021 11:31 AM EDT by Sharlotte Sep      x2    (X) Possible oxygen    9/21/2021 11:31 AM EDT by Sharlotte Sep      O2 @ 2L/NC, Sats 96-99%, RR 16-18    Medications    (X) Oral or parenteral analgesics    9/21/2021 11:31 AM EDT by Sharlotte Sep      HYDROmorphone (DILAUDID) injection 0.5 mg  IV x4  oxyCODONE-acetaminophen (PERCOCET)  MG per tablet 1 tablet  PO x3    * Milestone   Additional Notes   9/18      Pulm   Patient had additional rt chest tube placed on 9/17. There are now two chest tubes on the rt, both to suction       Examination:   Vitals: /61   Pulse 68   Temp 97.9 °F (36.6 °C) (Oral)   Resp 16   Ht 5' 8\" (1.727 m)   Wt 139 lb 14.4 oz (63.5 kg)   SpO2 98%   BMI 21.27 kg/m²    General appearance: alert and cooperative with exam, resting in bed   Neck: No JVD   Lungs: fair air exchange with diminished bases, crackles. Positive subcutaneous emphysema right chest neck and extending to entire rt side of posterior chest wall, there is bubbling in chest tube #2 only.  98% on 2 l nc   Heart: regular rate and rhythm, S1, S2 normal, no gallop   Abdomen: Soft, non tender, + BS   Extremities: no cyanosis or clubbing.  No significant edema      Recommendations:   · Oxygen via nasal cannula at 2 L secondary to pneumothorax   · Continue chest tube to wall suction   · Continue Bevespi   · Pain control per primary team   · X-ray chest now and in am 9-19-21    · todays CXR with resolving PTX   · Labs: CBC and BMP in am   · DVT prophylaxis with low molecular weight heparin       Plan of car discussed with Dr Arabella Castro   ----------      IM      Follow-up COPD   No fever no chills mild shortness of breath no cough no wheezing      IM      Postprocedure heart rate sided pneumothorax   Lung nodules   COPD   Chronic smoker smoking cessation advised   Subcutaneous emphysema   Iron deficiency anemia we will start iron supplement will need GI work-up check stools for occult blood   Plan:       Meds labs reviewed continue with DVT prophylaxis pain control continue with the chest tube continue with the bronchodilators see orders   -------------------   CXR x2 above      Scheduled Meds:   · ferrous sulfate 325 mg Oral BID WC   · traZODone 100 mg Oral Nightly   · glycopyrrolate-formoterol 2 puff Inhalation BID   · sodium chloride flush 5-40 mL IntraVENous 2 times per day   · enoxaparin 40 mg SubCUTAneous Daily   · gabapentin 600 mg Oral TID      PRN   cyclobenzaprine (FLEXERIL) tablet 10 mg  PO x2   HYDROmorphone (DILAUDID) injection 0.5 mg  IV x4   oxyCODONE-acetaminophen (PERCOCET)  MG per tablet 1 tablet  PO x3       Pneumothorax - Care Day 3 (9/16/2021) by River Abbott RN       Review Status Review Entered   Completed 9/21/2021 11:23      Criteria Review      Care Day: 3 Care Date: 9/16/2021 Level of Care: Intermediate Care    Guideline Day 2    Level Of Care    (X) Floor    Clinical Status    (X) * Hypotension absent    ( ) * Pain absent or managed    ( ) * Respiratory distress absent    (X) * Chest tube air leak absent    ( ) * CXR shows resolution or improvement of pneumothorax    9/21/2021 11:23 AM EDT by Rubi Michael right apical pneumothorax with an indwelling small bore chest tube. Soft tissue emphysema again identified. Right basilar atelectasis. Activity    (X) Activity as tolerated    Routes    (X) Oral hydration    (X) Oral medications    (X) Usual diet    9/21/2021 11:23 AM EDT by Evita Obando      Reg    Interventions    (X) Possible chest tube    (X) Possible oxygen    Medications    (X) Oral or parenteral analgesics    9/21/2021 11:23 AM EDT by Evita Obando      HYDROmorphone (DILAUDID) injection 0.5 mg IV x3  oxyCODONE-acetaminophen (PERCOCET)  MG per tablet 1 tablet  PO x4    * Milestone   Additional Notes   9/16      Pulm      Patient seen for the follow up of right upper lobe nodule, COPD, Postprocedural pneumothorax        Subjective:   No significant overnight events noted.  He is resting comfortably in bed, no distress.  His chest tube remains to suction.  He still has positive air leak in his chest tube.  He continues to have some mild discomfort around chest tube insertion site.  Denies significant shortness of breath or cough.       Examination:   Vitals: BP 94/66   Pulse 71   Temp 98.2 °F (36.8 °C) (Oral)   Resp 18   Ht 5' 8\" (1.727 m)   Wt 139 lb 9.6 oz (63.3 kg)   SpO2 98%   BMI 21.23 kg/m²    General appearance: alert and cooperative with exam, resting in bed   Neck: No JVD   Lungs: Moderate air exchange, positive crackles   Heart: regular rate and rhythm, S1, S2 normal, no gallop   Abdomen: Soft, non tender, + BS   Extremities: no cyanosis or clubbing. No significant edema       \Impression:   · Right pneumothorax   · Right upper lobe lung nodule s/p biopsy 9/14/2021   · COPD/active smoking, 70-pack-year history       Recommendations:   · Oxygen via nasal cannula at 2 L secondary to pneumothorax   · Continue chest tube to wall suction, no clamping today secondary to air leak.  Will repeat x-ray chest again in a.m. to reevaluate. · Continue Bevespi   · Pain control per primary team   · X-ray chest in am   · Labs: CBC and BMP in am   · DVT prophylaxis with low molecular weight heparin   · Discussed with RN   · Above assessment and plan will be reviewed with Dr. Alan Aragon will be finalized following review by Dr. Cherl Frankel.    · Will follow with you    ----------------------   IM      Assessment      Post procedure will right-sided pneumothorax   Lung nodule   COPD   Chronic smoker   Chronic low back pain   Subcutaneous emphysema   Mild anemia   Plan:       Labs reviewed continue with DVT prophylaxis smoking cessation advised continue with bronchodilators chest tube per pulmonary continue with Dilaudid for pain as well as the oral will check U40 folic acid and iron stores    --------   CXR: above         9/16/2021 05:49   Sodium: 139   Potassium: 4.7   Chloride: 104   CO2: 30   BUN: 12   Creatinine: 0.77   Bun/Cre Ratio: 16   Anion Gap: 5 (L)   GFR Non-: >60   GFR African American: >60   Glucose: 111 (H)   Calcium: 8.5 (L)   GFR Comment: Pend   GFR Staging: NOT REPORTED   WBC: 7.3   RBC: 3.93 (L)   Hemoglobin Quant: 12.4 (L)   Hematocrit: 37.9 (L)   MCV: 96.4   MCH: 31.6   MCHC: 32.7   MPV: 9.4   RDW: 13.2   Platelet Count: 913   Platelet Estimate: NOT REPORTED   Absolute Mono #: 0.86   Eosinophils %: 2   Basophils Absolute: 0.05   Differential Type: NOT REPORTED   Seg Neutrophils: 66 (H)   Segs Absolute: 4.92   Lymphocytes: 18 (L)   Absolute Lymph #: 1.28   Monocytes: 12   Absolute Eos #: 0.12   Basophils: 1   Immature Granulocytes: 1 (H)   WBC Morphology: NOT REPORTED   RBC Morphology: NOT REPORTED      9/16/2021 19:29   Ferritin: 74   Iron: 32 (L)   Iron Saturation: 14 (L)   UIBC: 202   TIBC: 234 (L)   Folate: 7.6   Vitamin B-12: 503   ----------------      Meds   enoxaparin (LOVENOX) injection 40 mg    Dose: 40 mg   Freq: DAILY Route: SC      gabapentin (NEURONTIN) capsule 600 mg    Dose: 600 mg   Freq: 3 TIMES DAILY Route: PO      glycopyrrolate-formoterol (BEVESPI) 9-4.8 MCG/ACT inhaler 2 puff    Dose: 2 puff   Freq: 2 TIMES DAILY Route: IN      sodium chloride flush 0.9 % injection 5-40 mL    Dose: 5-40 mL   Freq: 2 times per day Route: IV      traZODone (DESYREL) tablet 100 mg    Dose: 100 mg   Freq: NIGHTLY Route: PO      PRN   cyclobenzaprine (FLEXERIL) tablet 10 mg  PO x2   HYDROmorphone (DILAUDID) injection 0.5 mg IV x3   oxyCODONE-acetaminophen (PERCOCET)  MG per tablet 1 tablet  PO x4

## 2021-09-21 NOTE — PROGRESS NOTES
Pulmonary Critical Care Progress Note  Magda Mota MD     Patient seen for the follow up of right upper lobe nodule, COPD, Postprocedural pneumothorax     Subjective:  No significant overnight events noted. He had anterior chest tube removed per CT surgery yesterday. His posterior chest tube remains to 40 cm of suction with a airleak. Shortness of breath is unchanged. He denies cough or chest pain. Did not sleep well last night so he is feeling fairly tired today. Examination:  Vitals: /82   Pulse 84   Temp 98.1 °F (36.7 °C) (Oral)   Resp 16   Ht 5' 8\" (1.727 m)   Wt 141 lb 9 oz (64.2 kg)   SpO2 97%   BMI 21.52 kg/m²   General appearance: alert and cooperative with exam, resting in bed  Neck: No JVD  Lungs: Moderate air exchange, no crackles or wheezing  Heart: regular rate and rhythm, S1, S2 normal, no gallop  Abdomen: Soft, non tender, + BS  Extremities: no cyanosis or clubbing. No significant edema    LABs:  CBC:   Recent Labs     09/20/21  0505 09/21/21  0528   WBC 7.3 8.3   HGB 12.5* 13.7   HCT 38.1* 41.2    280     BMP:   Recent Labs     09/19/21  0510 09/20/21  0505    140   K 4.5 4.0   CO2 30 31   BUN 15 19   CREATININE 0.67* 0.72   LABGLOM >60 >60   GLUCOSE 108* 105*     Radiology:  9/21/21  Small right pneumothorax measuring approximately 1 cm in maximal dimension, slightly more prominent compared to previous study. CT chest 9/17/2021      Impression:  · Right pneumothorax  · Right upper lobe lung nodule s/p biopsy 9/14/2021  · COPD/active smoking, 70-pack-year history    Recommendations:  · Oxygen via nasal cannula at 2 L secondary to pneumothorax  · Posterior chest tube remains to suction airleak. Treatment per CT surgery. · Surgical pathology negative for malignancy. Showing hyalinized granuloma.   · Continue Bevespi  · Pain control   · X-ray chest in am  · Labs: CBC and BMP in am  · DVT prophylaxis with low molecular weight heparin  · Discussed with

## 2021-09-21 NOTE — PROGRESS NOTES
Comprehensive Nutrition Assessment    Type and Reason for Visit:  Initial, RD Nutrition Re-Screen/LOS    Nutrition Recommendations/Plan:   1. Continue current Adult Diet; Regular  2. Add Ensure Enlive 1x/d to meal trays  3. Monitor po intakes, ONS acceptance, and labs    Nutrition Assessment:  Patient seen for length of stay. Patient admission related to pneumothorax. Had 2 chest tubes placed- CT removed anterior chest tube yesterday 9/22. Patient reports eating but consuming small amounts of food. ~50% of meals- reports \"large portions here\". Patient reports no weight loss/ change in appetite- EHR shows ~9# wt loss x 5 months. Will add Ensure Enlive 1x/d. Will monitor po intakes, ONS acceptance, weights and labs. Malnutrition Assessment:  Malnutrition Status: At risk for malnutrition (Comment)    Context:  Acute Illness     Findings of the 6 clinical characteristics of malnutrition:  Energy Intake:  7 - 50% or less of estimated energy requirements for 5 or more days  Weight Loss:  Unable to assess     Body Fat Loss:  Unable to assess     Muscle Mass Loss:  Unable to assess    Fluid Accumulation:  No significant fluid accumulation     Strength:  Not Performed    Estimated Daily Nutrient Needs:  Energy (kcal):  1793-9817 kcal (MSJ 1.2, 1.3); Weight Used for Energy Requirements:  Current     Protein (g):  70-83 gm (1.1-1.3 g/kg); Weight Used for Protein Requirements:  Current        Method Used for Fluid Requirements:  1 ml/kcal      Nutrition Related Findings:  Active bowel sounds. No edema. Labs reviewed. Procedure: CT guided right lung biopsy, right chest tube placement (9/14)      Wounds:   (chest tubes in place)       Current Nutrition Therapies:    ADULT DIET;  Regular  Adult Oral Nutrition Supplement; Standard High Calorie/High Protein Oral Supplement    Anthropometric Measures:  · Height: 5' 8\" (172.7 cm)  · Current Body Weight: 141 lb 9 oz (64.2 kg)   · Admission Body Weight: 143 lb (64.9 kg) · Usual Body Weight: 150 lb (68 kg) (5/24/2021 per EHR)     · Ideal Body Weight: 154 lbs; % Ideal Body Weight 91.9 %   · BMI: 21.5  · Adjusted Body Weight:  ; No Adjustment     · BMI Categories: Normal Weight (BMI 18.5-24. 9)       Nutrition Diagnosis:   · Inadequate protein-energy intake related to inadequate protein-energy intake as evidenced by intake 0-25%, intake 26-50%    Nutrition Interventions:   Food and/or Nutrient Delivery:  Continue Current Diet, Start Oral Nutrition Supplement  Nutrition Education/Counseling:  Education not indicated   Coordination of Nutrition Care:  Continue to monitor while inpatient    Goals:  PO intakes to provide >50% of estimated nutritional needs       Nutrition Monitoring and Evaluation:   Behavioral-Environmental Outcomes:  None Identified   Food/Nutrient Intake Outcomes:  Food and Nutrient Intake, Supplement Intake  Physical Signs/Symptoms Outcomes:  Biochemical Data, Chewing or Swallowing, Fluid Status or Edema, Weight, Skin     Discharge Planning:     Too soon to determine     Opal Delgadillo, [address] [place] [address], [address] [place] [address]  Office Number: [phone]

## 2021-09-21 NOTE — PROGRESS NOTES
Physical Therapy  Facility/Department: [facility] PROGRESSIVE CARE  Daily Treatment Note  NAME: Nikko Correa  : 1960  MRN: [id number]    Date of Service: 2021    Discharge Recommendations:  Patient would benefit from continued therapy after discharge        Assessment   Body structures, Functions, Activity limitations: Decreased functional mobility ; Decreased ADL status; Decreased strength;Decreased safe awareness;Decreased endurance;Decreased balance  Assessment: Pt with minimal deficits noted in bed mobility, transfers, ambulation, balance, and endurance this session. Pt requires continued PT to maximize independence with functional mobility, balance, safety awareness & activity tolerance. Pt would benefit from additional therapy at time of discharge. Please refer to the AM-PAC score for current functional status. Prognosis: Good  PT Education: Goals;PT Role;Plan of Care;Transfer Training;Functional Mobility Training;Gait Training;Energy Conservation  Patient Education: Ed pt on functional mobility, safety awareness, importance of being up & OOB to regain strength, & prevention of sedentary complications, circulation ex's,  & optimal breathing techniques  REQUIRES PT FOLLOW UP: Yes  Activity Tolerance  Activity Tolerance: Patient limited by endurance; Patient limited by pain     Patient Diagnosis(es): There were no encounter diagnoses. has a past medical history of Arthralgia, Cervical myelopathy ([address] DDD (degenerative disc disease), lumbar, Lumbar disc herniation, Retrolisthesis, and Spondylolisthesis of lumbar region. has a past surgical history that includes Cholecystectomy; back surgery (2018); Neck surgery; cervical fusion (N/A, 2021); CT NEEDLE BIOPSY LUNG PERCUTANEOUS (2021); CT GUIDED CHEST TUBE (2021); and CT GUIDED CHEST TUBE (2021).     Restrictions  Restrictions/Precautions  Restrictions/Precautions: General Precautions, Fall Risk  Required Braces or Orthoses?: Apparatus: O2  Assistance: Contact guard assistance  Quality of Gait: no LOB, steady with RW  Distance: 20' x 3  Comments: Pt continues to be impulsive during ambulation and transfer. VCs for decreased speed and increased safety awareness to improve safety and decrease fall risk, fair-poor carryover of edu. Stairs/Curb  Stairs?: No  Neuromuscular Education  NDT Treatment: Sitting;Standing;Gait   Neuromuscular Comments: VCs req for proper breathing alexys (pursed lip breathing) during functional mobility. Tactile and VCs req for postural control during sit<>stands & amb to promote abdominal and erector spinae mm facilitation for increased stability and balance, decreasing kyphosis of the spine. Pt req VCs to correct for anterior translation of femur with squatting in addition to pressing firmly into ground with feet, to promote the appropriate body mechanics for sit<>stand transfers. Balance  Posture: Good  Sitting - Static: Good  Sitting - Dynamic: Good  Standing - Static: Good;-  Standing - Dynamic: Fair  Comments: w/ RW for standing balance  Exercises  Comments: Seated jerri LE AROM x 10 reps with good understanding to improve LE strength and coordination. AM-PAC Score  AM-PAC Inpatient Mobility Raw Score : 16 (09/21/21 1332)  AM-PAC Inpatient T-Scale Score : 40.78 (09/21/21 1332)  Mobility Inpatient CMS 0-100% Score: 54.16 (09/21/21 1332)  Mobility Inpatient CMS G-Code Modifier : CK (09/21/21 1332)          Goals  Short term goals  Time Frame for Short term goals: 12 visits  Short term goal 1: Inc bed-mobility & transfers to independent to enable pt to safely get in/OOB & chair  Short term goal 2: Inc gait to amb 350ft or > indep to enable pt to return to previous level of independence  Short term goal 3: Pt able to go up/down 6 steps with one rail indep  Short term goal 4:  Inc standing balance to good with device to facilitate pt independence for performance of ADL's & functional mobility, & reduce fall

## 2021-09-21 NOTE — PLAN OF CARE
Problem: Falls - Risk of:  Goal: Will remain free from falls  Description: Will remain free from falls  Outcome: Ongoing  Goal: Absence of physical injury  Description: Absence of physical injury  Outcome: Ongoing     Problem: Pain:  Goal: Pain level will decrease  Description: Pain level will decrease  Outcome: Ongoing  Goal: Control of acute pain  Description: Control of acute pain  Outcome: Ongoing  Goal: Control of chronic pain  Description: Control of chronic pain  Outcome: Ongoing     Problem: IP BALANCE  Goal: LTG - Patient will maintain balance to allow for safe/functional mobility  Outcome: Ongoing     Problem: Breathing Pattern - Ineffective:  Goal: Ability to achieve and maintain a regular respiratory rate will improve  Description: Ability to achieve and maintain a regular respiratory rate will improve  Outcome: Ongoing     Problem: SAFETY  Goal: Free from accidental physical injury  Outcome: Ongoing     Problem: KNOWLEDGE DEFICIT  Goal: Patient/S.O. demonstrates understanding of disease process, treatment plan, medications, and discharge instructions.   Outcome: Ongoing     Problem: DISCHARGE BARRIERS  Goal: Patient's continuum of care needs are met  Outcome: Ongoing

## 2021-09-21 NOTE — PLAN OF CARE
Nutrition Problem #1: Inadequate protein-energy intake  Intervention: Food and/or Nutrient Delivery: Continue Current Diet, Start Oral Nutrition Supplement  Nutritional Goals: PO intakes to provide >50% of estimated nutritional needs

## 2021-09-21 NOTE — PROGRESS NOTES
Physical Therapy  Facility/Department: [facility] PROGRESSIVE CARE  Daily Treatment Note  NAME: Darius Bedolla  : 1960  MRN: [id number]    Date of Service: 2021    Discharge Recommendations:  Patient would benefit from continued therapy after discharge     Pt presenting with new musculoskeletal dysfunction and would benefit from additional therapy at time of discharge. Please refer to the AM-PAC score for current functional status. Assessment   Body structures, Functions, Activity limitations: Decreased functional mobility ; Decreased ADL status; Decreased strength;Decreased safe awareness;Decreased endurance;Decreased balance  Assessment: Pt with minimal deficits noted in bed mobility, transfers, ambulation, balance, and endurance this session. Pt requires continued PT to maximize independence with functional mobility, balance, safety awareness & activity tolerance. Pt would benefit from additional therapy at time of discharge. Please refer to the AM-PAC score for current functional status. Prognosis: Good  PT Education: Goals;PT Role;Plan of Care;Transfer Training;Functional Mobility Training;Gait Training;Energy Conservation  Patient Education: Ed pt on functional mobility, safety awareness, importance of being up & OOB to regain strength, & prevention of sedentary complications, circulation ex's,  & optimal breathing techniques  REQUIRES PT FOLLOW UP: Yes  Activity Tolerance  Activity Tolerance: Patient limited by endurance; Patient limited by pain     Patient Diagnosis(es): There were no encounter diagnoses. has a past medical history of Arthralgia, Cervical myelopathy ([address] DDD (degenerative disc disease), lumbar, Lumbar disc herniation, Retrolisthesis, and Spondylolisthesis of lumbar region. has a past surgical history that includes Cholecystectomy; back surgery (2018);  Neck surgery; cervical fusion (N/A, 2021); CT NEEDLE BIOPSY LUNG PERCUTANEOUS (2021); CT GUIDED CHEST TUBE (9/14/2021); and CT GUIDED CHEST TUBE (9/17/2021). Restrictions  Restrictions/Precautions  Restrictions/Precautions: General Precautions, Fall Risk  Required Braces or Orthoses?: No  Position Activity Restriction  Other position/activity restrictions: Up with assist, chest tube on R, RUE IV, alarms  Subjective   General  Chart Reviewed: Yes  Additional Pertinent Hx: COPD, chronic smoker, cervical spondylosis with myelopathy  Response To Previous Treatment: Patient reporting fatigue but able to participate. Family / Caregiver Present: No  Subjective  Subjective: With encouragement pt  agreed  to PT session  General Comment  Comments: RN okays PT          Orientation  Orientation  Overall Orientation Status: Within Normal Limits  Cognition   Cognition  Overall Cognitive Status: Exceptions  Arousal/Alertness: Appropriate responses to stimuli  Following Commands: Follows all commands without difficulty  Attention Span: Appears intact  Memory: Appears intact  Safety Judgement: Decreased awareness of need for safety;Decreased awareness of need for assistance  Problem Solving: Assistance required to correct errors made;Assistance required to identify errors made;Decreased awareness of errors;Assistance required to generate solutions  Insights: Decreased awareness of deficits  Initiation: Requires cues for some  Sequencing: Does not require cues  Objective   Bed mobility  Rolling to Left: Modified independent  Rolling to Right: Modified independent  Supine to Sit: Modified independent  Sit to Supine: Unable to assess  Scooting: Stand by assistance  Comment: HOB partially raised and good ue of bed rails. Patient remained up in chair at end of treatment.   Transfers  Sit to Stand: Contact guard assistance  Stand to sit: Contact guard assistance  Bed to Chair: Contact guard assistance  Stand Pivot Transfers: Contact guard assistance  Lateral Transfers: Contact guard assistance  Comment: Ed + tactile assist on correct use of upper body for safe sit/stand + assist for management of lines  Ambulation  Ambulation?: Yes  More Ambulation?: No  Ambulation 1  Surface: level tile  Device: Rolling Walker  Other Apparatus: O2  Assistance: Contact guard assistance  Quality of Gait: no LOB, steady with RW  Distance: 20' x 2  Comments: Pt continues to be impulsive with all mobility  Stairs/Curb  Stairs?: No  Neuromuscular Education  NDT Treatment: Sitting;Standing;Gait   Neuromuscular Comments: VCs req for proper breathing alexys (pursed lip breathing) during functional mobility. Tactile and VCs req for postural control during sit<>stands & amb to promote abdominal and erector spinae mm facilitation for increased stability and balance, decreasing kyphosis of the spine. Pt req VCs to correct for anterior translation of femur with squatting in addition to pressing firmly into ground with feet, to promote the appropriate body mechanics for sit<>stand transfers. Balance  Posture: Good  Sitting - Static: Good  Sitting - Dynamic: Good  Standing - Static: Good;-  Standing - Dynamic: Fair  Comments: w/ RW for standing balance  Exercises  Comments: Seated jerri LE AROM x 10 reps with good understanding to improve LE strength and coordination. AM-PAC Score  AM-PAC Inpatient Mobility Raw Score : 16 (09/21/21 1332)  AM-PAC Inpatient T-Scale Score : 40.78 (09/21/21 1332)  Mobility Inpatient CMS 0-100% Score: 54.16 (09/21/21 1332)  Mobility Inpatient CMS G-Code Modifier : CK (09/21/21 1332)          Goals  Short term goals  Time Frame for Short term goals: 12 visits  Short term goal 1: Inc bed-mobility & transfers to independent to enable pt to safely get in/OOB & chair  Short term goal 2: Inc gait to amb 350ft or > indep to enable pt to return to previous level of independence  Short term goal 3: Pt able to go up/down 6 steps with one rail indep  Short term goal 4:  Inc standing balance to good with device to facilitate pt independence for performance of ADL's & functional mobility, & reduce fall risk  Short term goal 5: Ed pt on home ex's, safety & energy principles, fall prevention, & issue written home program    Plan    Plan  Times per week: 1-2x/D, 5-6D/week  Current Treatment Recommendations: Strengthening, Balance Training, Functional Mobility Training, Transfer Training, Endurance Training, ADL/Self-care Training  Safety Devices  Type of devices: Nurse notified, Left in chair, Gait belt, Chair alarm in place, Call light within reach, All fall risk precautions in place     Therapy Time   Individual Concurrent Group Co-treatment   Time In 1205         Time Out 1248         Minutes . [facility]

## 2021-09-21 NOTE — CARE COORDINATION
Discharge planning     Patient admitted with right pneumothorax s/p right lung bx. Pulmonary and CTSX following. He is on 2 liters NC. Now has 1 chest tubes to right lung to suction at -40. Anterior  CT was removed yesterday per IR     Patient lives at home with spouse has walker in home, independent. CTSX 9/20  Keep remaining chest tube to suction. Continue to monitor chest x-rays daily. Patient encouraged to ambulate, use his IS, cough and deep breath.

## 2021-09-22 ENCOUNTER — APPOINTMENT (OUTPATIENT)
Dept: GENERAL RADIOLOGY | Age: 61
DRG: 200 | End: 2021-09-22
Attending: INTERNAL MEDICINE
Payer: COMMERCIAL

## 2021-09-22 VITALS
HEART RATE: 78 BPM | RESPIRATION RATE: 16 BRPM | SYSTOLIC BLOOD PRESSURE: 132 MMHG | TEMPERATURE: 98.1 F | BODY MASS INDEX: 21.37 KG/M2 | WEIGHT: 141 LBS | DIASTOLIC BLOOD PRESSURE: 82 MMHG | OXYGEN SATURATION: 94 % | HEIGHT: 68 IN

## 2021-09-22 PROCEDURE — 2580000003 HC RX 258: Performed by: NURSE PRACTITIONER

## 2021-09-22 PROCEDURE — 94761 N-INVAS EAR/PLS OXIMETRY MLT: CPT

## 2021-09-22 PROCEDURE — 2700000000 HC OXYGEN THERAPY PER DAY

## 2021-09-22 PROCEDURE — 6360000002 HC RX W HCPCS: Performed by: INTERNAL MEDICINE

## 2021-09-22 PROCEDURE — APPSS30 APP SPLIT SHARED TIME 16-30 MINUTES: Performed by: NURSE PRACTITIONER

## 2021-09-22 PROCEDURE — 71045 X-RAY EXAM CHEST 1 VIEW: CPT

## 2021-09-22 PROCEDURE — 94640 AIRWAY INHALATION TREATMENT: CPT

## 2021-09-22 PROCEDURE — 6370000000 HC RX 637 (ALT 250 FOR IP): Performed by: INTERNAL MEDICINE

## 2021-09-22 RX ORDER — LANOLIN ALCOHOL/MO/W.PET/CERES
325 CREAM (GRAM) TOPICAL 2 TIMES DAILY WITH MEALS
Qty: 90 TABLET | Refills: 0 | Status: SHIPPED | OUTPATIENT
Start: 2021-09-23

## 2021-09-22 RX ADMIN — HYDROMORPHONE HYDROCHLORIDE 0.25 MG: 1 INJECTION, SOLUTION INTRAMUSCULAR; INTRAVENOUS; SUBCUTANEOUS at 12:35

## 2021-09-22 RX ADMIN — HYDROMORPHONE HYDROCHLORIDE 0.25 MG: 1 INJECTION, SOLUTION INTRAMUSCULAR; INTRAVENOUS; SUBCUTANEOUS at 08:27

## 2021-09-22 RX ADMIN — SODIUM CHLORIDE, PRESERVATIVE FREE 5 ML: 5 INJECTION INTRAVENOUS at 08:29

## 2021-09-22 RX ADMIN — GLYCOPYRROLATE AND FORMOTEROL FUMARATE 2 PUFF: 9; 4.8 AEROSOL, METERED RESPIRATORY (INHALATION) at 10:14

## 2021-09-22 RX ADMIN — OXYCODONE AND ACETAMINOPHEN 1 TABLET: 10; 325 TABLET ORAL at 14:27

## 2021-09-22 RX ADMIN — FERROUS SULFATE TAB EC 325 MG (65 MG FE EQUIVALENT) 325 MG: 325 (65 FE) TABLET DELAYED RESPONSE at 08:21

## 2021-09-22 RX ADMIN — FERROUS SULFATE TAB EC 325 MG (65 MG FE EQUIVALENT) 325 MG: 325 (65 FE) TABLET DELAYED RESPONSE at 17:04

## 2021-09-22 RX ADMIN — OXYCODONE AND ACETAMINOPHEN 1 TABLET: 10; 325 TABLET ORAL at 06:36

## 2021-09-22 RX ADMIN — HYDROMORPHONE HYDROCHLORIDE 0.25 MG: 1 INJECTION, SOLUTION INTRAMUSCULAR; INTRAVENOUS; SUBCUTANEOUS at 02:27

## 2021-09-22 ASSESSMENT — PAIN SCALES - GENERAL
PAINLEVEL_OUTOF10: 6
PAINLEVEL_OUTOF10: 7
PAINLEVEL_OUTOF10: 6
PAINLEVEL_OUTOF10: 7
PAINLEVEL_OUTOF10: 8
PAINLEVEL_OUTOF10: 7
PAINLEVEL_OUTOF10: 7

## 2021-09-22 NOTE — PROGRESS NOTES
Pt had repeat chest xray and chest tube switched to one way valve and pt is ok to go home today and repeat xray in am

## 2021-09-22 NOTE — PLAN OF CARE
Problem: Falls - Risk of:  Goal: Will remain free from falls  Description: Will remain free from falls  9/21/2021 2210 by Nicky Mejia RN  Outcome: Ongoing  Note: Fall risk assessment completed. Patient instructed to use call light. Bed locked and in lowest position, side rails up 2/4, call light and bedside table within reach, clutter removed, and non-skid footwear on when pt out of bed. Hourly rounds will continue.       Problem: Falls - Risk of:  Goal: Absence of physical injury  Description: Absence of physical injury  9/21/2021 2210 by Nicky Mejia RN  Outcome: Ongoing     Problem: Breathing Pattern - Ineffective:  Goal: Ability to achieve and maintain a regular respiratory rate will improve  Description: Ability to achieve and maintain a regular respiratory rate will improve  9/21/2021 2210 by Nicky Mejia RN  Outcome: Ongoing     Problem: DISCHARGE BARRIERS  Goal: Patient's continuum of care needs are met  9/21/2021 2210 by Nicky Mejia RN  Outcome: Ongoing

## 2021-09-22 NOTE — PROGRESS NOTES
[facility]  Daily Progress Note      Subjective:  Mr. Carlo Mesa feels well today with no acute complaints. Pain is controlled on current medication regimen. Denies chest pain or shortness of breath. Plan of care reviewed and questions answered. Physical Exam  Vital Signs: /76   Pulse 75   Temp 98.1 °F (36.7 °C) (Oral)   Resp 18   Ht 5' 8\" (1.727 m)   Wt 141 lb (64 kg)   SpO2 99%   BMI 21.44 kg/m²  O2 Flow Rate (L/min): 2 L/min   Admit Weight: Weight: 143 lb (64.9 kg)   WEIGHTWeight: 141 lb (64 kg)     General: alert and oriented to person, place and time, well-developed and well-nourished, in no acute distress. Heart: Normal S1 and S2.  Regular rhythm. No murmurs, gallops, or rubs. Pacing Wires: No   Lungs: clear to auscultation bilaterally and diminished breath sounds bibasilar Chest tubes: Yes, Air Leak: Yes,  1+  Abdomen: soft, non tender, non distended, BSx4  Extremities: negative  Wounds: clean and dry, healing appropriately. Scheduled Meds:    linaclotide  145 mcg Oral QAM AC    docusate sodium  100 mg Oral BID    senna  2 tablet Oral Nightly    ferrous sulfate  325 mg Oral BID WC    traZODone  100 mg Oral Nightly    glycopyrrolate-formoterol  2 puff Inhalation BID    sodium chloride flush  5-40 mL IntraVENous 2 times per day    enoxaparin  40 mg SubCUTAneous Daily    gabapentin  600 mg Oral TID     Continuous Infusions:    sodium chloride         Data:  CBC:   Recent Labs     09/20/21  0505 09/21/21  0528   WBC 7.3 8.3   HGB 12.5* 13.7   HCT 38.1* 41.2   MCV 95.5 96.3    280     BMP:   Recent Labs     09/20/21  0505      K 4.0      CO2 31   BUN 19   CREATININE 0.72     PT/INR: No results for input(s): PROTIME, INR in the last 72 hours. APTT: No results for input(s): APTT in the last 72 hours.     Chest X-Ray: Awaiting for results/images    I/O:  I/O last 3 completed shifts:  In: -   Out: 251 [Urine:251]    Assessment/Plan: Diagnosis Date    Arthralgia     Cervical myelopathy (HCC)     DDD (degenerative disc disease), lumbar     Lumbar disc herniation     Retrolisthesis     Spondylolisthesis of lumbar region       Neuro: Intact   Lungs: clear, diminished in the bases   HD: VSS   Edema: none noted   GI: Active bowel sounds X4   : Good urine output     Oxygen as needed via nasal cannula to maintain SpO2 > 92%   Chest x-ray daily   Keep Chest Tubes to wall suction  o Will consider putting chest tube to water seal following review of the CXR  o Keep patient NPO this morning for possible VATS   Encourage incentive spirometry, and ambulation     The above recommendations including medications and orders were discussed and agreed upon with Dr. Juve Guajardo, the attending on service for the cardiothoracic surgery group today. Electronically signed by ADELAIDE Andrews CNP on 9/22/2021 at 6:55 AM    On this date 9/22/2021 I have spent 20 minutes reviewing previous notes, test results and face to face with the patient discussing the diagnosis and importance of compliance with the treatment plan as well as documenting on the day of the visit. At least 50% of the time documented was spent with the patient to provide counseling and/or coordination of care. This note was created with the assistance of a speech-recognition program.  Although the intention is to generate a document that actually reflects the content of the visit, no guarantees can be provided that every mistake has been identified and corrected by editing. Note was updated later by me after  physical examination and  completion of the assessment.

## 2021-09-22 NOTE — PROGRESS NOTES
Pt discharged to home today by car with wife, pt read and understood discharge instructions, script given for chest xray that pt needs to have tomorrow, scripts also given for linzess and iron, pt will follow up with Dr. Yadira Cullen, Dr. Kentrell Perez and Dr. Maia Arcos, pt and wife  instructed on chest tube with one way valve instructions and they voiced understanding, pt discharged in stable condition

## 2021-09-22 NOTE — PROGRESS NOTES
Chest tube was placed to water seal by ct surgery and repeat xray at 1400, they would like pt to stay NPO for now, will continue to monitor

## 2021-09-22 NOTE — PROGRESS NOTES
Subjective:     Follow-up pneumothorax  Fairly well no shortness of breath no dyspnea noted tachypnea  ROS  Fever no chills no GI/ complaints no cardiopulmonary complaints no TIA no bleeding no headache no sore throat no skin lesions no polyuria no polydipsia no hypo-  physical exam  General Appearance: in no acute distress and alert  Skin: warm and dry, no rash or erythema  Head: normocephalic and atraumatic  Eyes: pupils equal, round, and reactive to light, conjunctivae normal and sclera anicteric     Neck: neck supple and non tender without mass   Pulmonary/Chest: clear to auscultation bilaterally- no wheezes, rales or rhonchi, normal air movement, no respiratory distress  Cardiovascular: normal rate, regular rhythm, normal S1 and S2, no gallops, intact distal pulses and no carotid bruits  Abdomen: soft, non-tender, non-distended, normal bowel sounds, no masses or organomegaly  Extremities: no edema and good pulses no Homans' sign  Neurologic: Alert oriented x3 with no focal deficit    /82   Pulse 78   Temp 98.1 °F (36.7 °C) (Oral)   Resp 16   Ht 5' 8\" (1.727 m)   Wt 141 lb (64 kg)   SpO2 93%   BMI 21.44 kg/m²     CBC:   Lab Results   Component Value Date    WBC 8.3 09/21/2021    RBC 4.28 09/21/2021    HGB 13.7 09/21/2021    HCT 41.2 09/21/2021    MCV 96.3 09/21/2021    MCH 32.0 09/21/2021    MCHC 33.3 09/21/2021    RDW 12.9 09/21/2021     09/21/2021    MPV 9.4 09/21/2021     BMP:    Lab Results   Component Value Date     09/20/2021    K 4.0 09/20/2021     09/20/2021    CO2 31 09/20/2021    BUN 19 09/20/2021    CREATININE 0.72 09/20/2021    CALCIUM 9.0 09/20/2021    GFRAA >60 09/20/2021    LABGLOM >60 09/20/2021    GLUCOSE 105 09/20/2021        Assessment:  Patient Active Problem List   Diagnosis    Cervical spondylosis with myelopathy    Postprocedural pneumothorax    Pneumothorax     Postprocedural right pneumothorax  COPD  Chronic smoker  Iron deficiency anemia  Slow transit constipation  Chronic low back pain  Lung nodule biopsy benign  Plan:    Labs reviewed patient going home with the chest tube to the valve GI work-up as an outpatient for his iron deficiency anemia smoking cessation advised see orders we will follow up with his PCP cardiothoracic surgeon and pulmonary      Ketty Traylor MD MD  5:12 PM

## 2021-09-22 NOTE — CARE COORDINATION
Chest tube placed to water seal at this time. Will re-evaluate a CXR at 1400 today to evaluate if patient needs VATS or if patient can go home with CT and follow up with CTS on Thursday.

## 2021-09-22 NOTE — PROGRESS NOTES
Occupational [address]  Occupational Therapy Not Seen Note    Patient not available for Occupational Therapy due to:    [] Testing:    [] Hemodialysis    [] Cancelled by RN:    [x]Refusal by Patient:9/22: (CX) Pt declining therapy at this time, reporting he would like to rest and has been getting up throughout the day. Will continue to follow.     [] Surgery:     [] Intubation:     [] Pain Medication:    [] Sedation:     [] Spine Precautions :    [] Medical Instability:    [] Other:      Mustapha Steel OT

## 2021-09-22 NOTE — PROGRESS NOTES
Pulmonary Critical Care Progress Note  Chris Bahena CNP/Mateus Pratt MD     Patient seen for the follow up of right upper lobe nodule, COPD, Postprocedural pneumothorax     Subjective:  No significant overnight events noted. His posterior chest tube has been to waterseal since this morning. He had repeat x-ray chest around 2 PM which did not show any increase in pneumothorax or subcutaneous emphysema. CT surgery continued atrium and replaced with Heimlich valve. He denies cough or chest pain. Shortness of breath is minimal.    Examination:  Vitals: /74   Pulse 73   Temp 98.2 °F (36.8 °C) (Oral)   Resp 18   Ht 5' 8\" (1.727 m)   Wt 141 lb (64 kg)   SpO2 98%   BMI 21.44 kg/m²   General appearance: alert and cooperative with exam, resting in bed  Neck: No JVD  Lungs: Moderate air exchange, no crackles or wheezing  Heart: regular rate and rhythm, S1, S2 normal, no gallop  Abdomen: Soft, non tender, + BS  Extremities: no cyanosis or clubbing. No significant edema    LABs:  CBC:   Recent Labs     09/20/21  0505 09/21/21  0528   WBC 7.3 8.3   HGB 12.5* 13.7   HCT 38.1* 41.2    280     BMP:   Recent Labs     09/20/21  0505      K 4.0   CO2 31   BUN 19   CREATININE 0.72   LABGLOM >60   GLUCOSE 105*     Radiology:  9/22/21        CT chest 9/17/2021      Impression:  · Right pneumothorax  · Right upper lobe lung nodule s/p biopsy 9/14/2021  · COPD/active smoking, 70-pack-year history    Recommendations:  · No objection to discharge from pulmonary standpoint with outpatient follow-up next week. He can get follow-up x-ray in the a.m. at clinic as an outpatient  · Surgical pathology negative for malignancy. Showing hyalinized granuloma.   · Continue Bevespi  · Pain control   · DVT prophylaxis with low molecular weight heparin  · Discussed with RN   · Discussed with Jomar Murdock CNP  · Will follow with you    Electronically signed by     ADELAIDE Garcia CNP on 9/22/2021 at 1:33 PM  Pulmonary Critical Care and Sleep Medicine,  [facility]: [phone]

## 2021-09-23 ENCOUNTER — CARE COORDINATION (OUTPATIENT)
Dept: CASE MANAGEMENT | Age: 61
End: 2021-09-23

## 2021-09-23 NOTE — CARE COORDINATION
[facility] Transitions Initial Follow Up Call    Call within 2 business days of discharge: Yes    Patient: Jonn Pal Patient : 1960   MRN: [id number]  Reason for Admission: Postprocedural pneumothorax  Discharge Date: 21 RARS: Readmission Risk Score: 9      Last Discharge [facility]       Complaint Diagnosis Description Type Department Provider    21   Admission (Discharged) Dereck Powell MD           Spoke with: Sarah Saab states he is doing ok. \"Taking it slow\"  History of COPD and lung nodule. Denies dyspnea or chest pain. States he has chronic pain from back and neck surgeries. Appetite is fair. Reviewed medications. Pt. Is now ordered ferrous sulfate and linzess that he will  from the pharmacy today. . Also takes stool softeners. Discussed taking opioids (percocet)  and constipation. Pt verbalized understanding. Medication review completed. Non [facility] provider. Pt. Has a walker to use for ambulation as needed. Reviewed upcoming appt. With Dr Luci Miranda on 21. Pt. Verbalized understanding. He states he has other appointments next week with PCP and has an x ray ordered today at [facility]. No new issues or concerns. Denies need for [address] or DME at this time. Final call. Transitions of Care Initial Call    Was this an external facility discharge? No Discharge Facility: n/a    Challenges to be reviewed by the provider   Additional needs identified to be addressed with provider: No  none             Method of communication with provider : none      Advance Care Planning:   Does patient have an Advance Directive: reviewed and current. Was this a readmission? No  Patient stated reason for admission: pneumothorax  Patients top risk factors for readmission: medical condition-pneumothorax    Care Transition Nurse (CTN) contacted the patient by telephone to perform post hospital discharge assessment. Verified name and  with patient as identifiers.  Provided introduction to self, and explanation of the CTN role. CTN reviewed discharge instructions, medical action plan and red flags with patient who verbalized understanding. Patient given an opportunity to ask questions and does not have any further questions or concerns at this time. Were discharge instructions available to patient? Yes. Reviewed appropriate site of care based on symptoms and resources available to patient including: PCP, Specialist and When to call 911. The patient agrees to contact the PCP office for questions related to their healthcare. Medication reconciliation was performed with patient, who verbalizes understanding of administration of home medications. Advised obtaining a 90-day supply of all daily and as-needed medications. Covid Risk Education     Educated patient about risk for severe COVID-19 due to risk factors according to CDC guidelines. LPN CC reviewed discharge instructions, medical action plan and red flag symptoms with the patient who verbalized understanding. Discussed COVID vaccination status: Yes. Education provided on COVID-19 vaccination as appropriate. Discussed exposure protocols and quarantine with CDC Guidelines. Patient was given an opportunity to verbalize any questions and concerns and agrees to contact LPN CC or health care provider for questions related to their healthcare. Reviewed and educated patient on any new and changed medications related to discharge diagnosis. Was patient discharged with a pulse oximeter? No Discussed and confirmed pulse oximeter discharge instructions and when to notify provider or seek emergency care. LPN CC provided contact information. No further follow-up call indicated based on severity of symptoms and risk factors.   Plan for next call: n/a        Facility: [address] [place] [address]    Non-face-to-face services provided:  Obtained and reviewed discharge summary and/or continuity of care documents    Care Transitions 24 Hour Call Care Transitions Interventions         Follow Up  Future Appointments   Date Time Provider Brock Victor   9/30/2021 11:15 AM Valerie Jacobsen MD [facility] [id number] LPN Care Transitions Nurse   [phone]

## 2021-09-26 NOTE — DISCHARGE SUMMARY
Physician Discharge Summary     Patient ID:  Christian Manriquez  [id number]  57 y.o.  1960    Admit date: 9/14/2021    Discharge date and time: 9/22/2021    Admission Diagnoses:   Patient Active Problem List   Diagnosis    Cervical spondylosis with myelopathy    Postprocedural pneumothorax    Pneumothorax       Discharge Diagnoses:  Postprocedural right pneumothorax  COPD  Chronic smoker  Iron deficiency anemia  Slow transit constipation  Chronic low back pain  Lung nodule    Consults: pulmonary/intensive care and cardiothoracic surgery and interventional radiology    Procedures: Chest tube x2  Hospital Course: 40-year-old gentleman with known history of lung nodule had an outpatient needle biopsy which caused him to have a right pneumothorax chest tube was put in and he was admitted to the hospital his stay was prolonged because his pneumothorax was not getting any better he needed a second chest tube still pneumothorax was not completely resolved cardiothoracic surgeon was consulted and finally patient was sent home with chest tube one-way valve he was supposed to have a chest x-ray in the morning and follow-up with cardiothoracic surgeondr hernandez in 1 week pulmonary in 1 week and his PCP in 1 week of note he was still smoking and advised to quit smoking, his lung biopsy was benign, he was found to have iron deficiency anemia iron supplements started advised GI work-up as an outpatient through his PCP  Discharge Exam:  See progress note from today    Disposition: home  Stable improved  Patient Instructions:   Discharge Medication List as of 9/22/2021  5:34 PM      START taking these medications    Details   ferrous sulfate (FE TABS 325) 325 (65 Fe) MG EC tablet Take 1 tablet by mouth 2 times daily (with meals), Disp-90 tablet, R-0Print      linaclotide (LINZESS) 145 MCG capsule Take 1 capsule by mouth every morning (before breakfast), Disp-30 capsule, R-0Print         CONTINUE these medications which have NOT

## 2021-09-28 DIAGNOSIS — J95.811 POSTPROCEDURAL PNEUMOTHORAX: Primary | ICD-10-CM

## 2021-09-30 ENCOUNTER — HOSPITAL ENCOUNTER (OUTPATIENT)
Dept: GENERAL RADIOLOGY | Age: 61
Discharge: HOME | End: 2021-10-02
Payer: COMMERCIAL

## 2021-09-30 ENCOUNTER — HOSPITAL ENCOUNTER (OUTPATIENT)
Age: 61
Discharge: HOME | End: 2021-10-02
Payer: COMMERCIAL

## 2021-09-30 ENCOUNTER — OFFICE VISIT (OUTPATIENT)
Dept: CARDIOTHORACIC SURGERY | Age: 61
End: 2021-09-30
Payer: COMMERCIAL

## 2021-09-30 VITALS
HEIGHT: 68 IN | RESPIRATION RATE: 17 BRPM | HEART RATE: 87 BPM | DIASTOLIC BLOOD PRESSURE: 80 MMHG | BODY MASS INDEX: 22.13 KG/M2 | SYSTOLIC BLOOD PRESSURE: 132 MMHG | WEIGHT: 146 LBS | OXYGEN SATURATION: 93 % | TEMPERATURE: 98.2 F

## 2021-09-30 DIAGNOSIS — J95.811 POSTPROCEDURAL PNEUMOTHORAX: ICD-10-CM

## 2021-09-30 DIAGNOSIS — J93.83 OTHER PNEUMOTHORAX: Primary | ICD-10-CM

## 2021-09-30 PROCEDURE — 71046 X-RAY EXAM CHEST 2 VIEWS: CPT

## 2021-09-30 PROCEDURE — 99213 OFFICE O/P EST LOW 20 MIN: CPT | Performed by: NURSE PRACTITIONER

## 2021-09-30 NOTE — PROGRESS NOTES
[facility]  Follow up      Subjective:  Mr. Tiff Morris is a 57 y.o. male s/p  Patient left [facility] with a chest tube hooked to mini atrium. Patient had a continuous small apical pneumothorax. Since discharge from hospital patient has had no shortness of breath or chest pain. Patient is able to do his ADLs at home with no issues. Patient has no nausea vomiting fever chills. Patient has had very minimal output in the atrium. Physical Exam  Vitals:  Vitals:    09/30/21 1120   BP: 132/80   Pulse: 87   Resp: 17   Temp: 98.2 °F (36.8 °C)   SpO2: 93%       General: Alert and Oriented x3. Ambulatory. No apparent distress. Chest:  No abnormality. Equal and symmetric expansion with respiration. Lungs:  Clear to auscultation. Cardiac:  Regular rate and rhythm without murmurs, rubs or gallops. Abdomen:  Soft, non-tender, normoactive bowel sounds. Extremities:  No edema. Intact pulses in all four extremities. Psychiatric: Mood and affect are appropriate. I reviewed chest x-ray there is no sign of a pneumothorax on film. No pleural effusion. Current Medications:    Current Outpatient Medications:     ferrous sulfate (FE TABS 325) 325 (65 Fe) MG EC tablet, Take 1 tablet by mouth 2 times daily (with meals), Disp: 90 tablet, Rfl: 0    linaclotide (LINZESS) 145 MCG capsule, Take 1 capsule by mouth every morning (before breakfast), Disp: 30 capsule, Rfl: 0    senna-docusate (STIMULANT LAXATIVE) 8.6-50 MG per tablet, Take 1 tablet by mouth as needed for Constipation, Disp: , Rfl:     oxyCODONE-acetaminophen (PERCOCET)  MG per tablet, Take 1 tablet by mouth every 4 hours as needed for Pain., Disp: , Rfl:     ANORO ELLIPTA 62.5-25 MCG/INH AEPB inhaler, Inhale 1 puff into the lungs daily, Disp: , Rfl:     Gabapentin, Once-Daily, (GRALISE) 600 MG TABS, Take 1,800 mg by mouth nightly for 30 days. , Disp: 90 tablet, Rfl: 0    cyclobenzaprine (FLEXERIL) 10 MG tablet, Take 1 tablet by mouth 3 times daily as needed for Muscle spasms, Disp: 60 tablet, Rfl: 0    traZODone (DESYREL) 100 MG tablet, Take 100 mg by mouth nightly, Disp: , Rfl:     Past Surgical History:   Procedure Laterality Date    BACK SURGERY  06/01/2018    CERVICAL FUSION N/A 5/24/2021    C 4-6 ACDF        HARDWARE REMOVAL        C6-7 PLATE - [employer]   NEURO MONITORING performed by Tonio Marrufo MD at [address]  9/14/2021    CT GUIDED CHEST TUBE 9/14/2021 [facility] CT SCAN    CT GUIDED CHEST TUBE  9/17/2021    CT GUIDED CHEST TUBE 9/17/2021 [facility] CT SCAN    CT NEEDLE BIOPSY LUNG PERCUTANEOUS  9/14/2021    CT NEEDLE BIOPSY LUNG PERCUTANEOUS 9/14/2021 [facility] CT SCAN    NECK SURGERY      cage and titanium disc       Social Hx: reports that he has been smoking cigarettes. He has a 70.00 pack-year smoking history. He has never used smokeless tobacco.    Assessment & Plan:   Remove chest tube with no complications. Bilateral breath sounds after chest tube was removed. Site is clean with no signs of infection. Patient will get a chest x-ray done tomorrow. Patient will call cardiothoracic surgery if any sudden onset of shortness of breath. Patient will also seek emergency room treatment as if he is unable to get ahold of us.       Follow-up as needed    ADELAIDE Kay - NP,ADELAIDE CNP

## 2021-09-30 NOTE — PROGRESS NOTES
[facility]  Office Visit      Subjective:  Mr. Karla Carlson is a 57 y.o. male s/p *** on *** with  *** Kathy Rojo, at ***. he feels well today. Pain is controlled, he is *** requiring narcotics for pain relief. he is taking ***. Denies chest pain or shortness of breath. Increasing activity as tolerated. Appetite {IS/IS NOT:75668} returning to normal. Bowel movements are regular. Denies fever, chills, or signs of infection at incision sites. Plan of care reviewed and questions answered. Chest xray reviewed. Mr. Karla Carlson is recovering at *** home, rehab - ***. Physical Exam  Vitals:  Vitals:    09/30/21 1120   BP: 132/80   Pulse: 87   Resp: 17   Temp: 98.2 °F (36.8 °C)   SpO2: 93%       General: Alert and Oriented x3. Ambulatory. No apparent distress. Chest:  No abnormality. Equal and symmetric expansion with respiration. Lungs:  Clear to auscultation. Cardiac:  Regular rate and rhythm without murmurs, rubs or gallops. Abdomen:  Soft, non-tender, normoactive bowel sounds. Extremities:  No edema. Intact pulses in all four extremities. Psychiatric: Mood and affect are appropriate.   Lines/Drains/Airway: ***  Surgical Wounds: ***    Current Medications:    Current Outpatient Medications:     ferrous sulfate (FE TABS 325) 325 (65 Fe) MG EC tablet, Take 1 tablet by mouth 2 times daily (with meals), Disp: 90 tablet, Rfl: 0    linaclotide (LINZESS) 145 MCG capsule, Take 1 capsule by mouth every morning (before breakfast), Disp: 30 capsule, Rfl: 0    senna-docusate (STIMULANT LAXATIVE) 8.6-50 MG per tablet, Take 1 tablet by mouth as needed for Constipation, Disp: , Rfl:     oxyCODONE-acetaminophen (PERCOCET)  MG per tablet, Take 1 tablet by mouth every 4 hours as needed for Pain., Disp: , Rfl:     ANORO ELLIPTA 62.5-25 MCG/INH AEPB inhaler, Inhale 1 puff into the lungs daily, Disp: , Rfl:     Gabapentin, Once-Daily, (GRALISE) 600 MG TABS, Take 1,800 mg by mouth nightly for 30 days. , Disp: 90 tablet, Rfl: 0    cyclobenzaprine (FLEXERIL) 10 MG tablet, Take 1 tablet by mouth 3 times daily as needed for Muscle spasms, Disp: 60 tablet, Rfl: 0    traZODone (DESYREL) 100 MG tablet, Take 100 mg by mouth nightly, Disp: , Rfl:     Past Surgical History:   Procedure Laterality Date    BACK SURGERY  06/01/2018    CERVICAL FUSION N/A 5/24/2021    C 4-6 ACDF        HARDWARE REMOVAL        C6-7 PLATE - [employer]   NEURO MONITORING performed by Avtar Lee MD at [address]  9/14/2021    CT GUIDED CHEST TUBE 9/14/2021 [facility] CT SCAN    CT GUIDED CHEST TUBE  9/17/2021    CT GUIDED CHEST TUBE 9/17/2021 [facility] CT SCAN    CT NEEDLE BIOPSY LUNG PERCUTANEOUS  9/14/2021    CT NEEDLE BIOPSY LUNG PERCUTANEOUS 9/14/2021 [facility] CT SCAN    NECK SURGERY      cage and titanium disc       Social Hx: reports that he has been smoking cigarettes. He has a 70.00 pack-year smoking history. He has never used smokeless tobacco.    Assessment & Plan:   1. Incisions ***, Pravena ***  2. Continue current medications. ***  3. Follow up with cardiology ( ***), and PCP as instructed at hospital discharge  4.  Follow up with CT surgery group ***      KIM JAVIER, APRN - NP,APRN CNP

## 2021-10-01 ENCOUNTER — HOSPITAL ENCOUNTER (OUTPATIENT)
Age: 61
Discharge: HOME | End: 2021-10-03
Payer: COMMERCIAL

## 2021-10-01 ENCOUNTER — HOSPITAL ENCOUNTER (OUTPATIENT)
Dept: GENERAL RADIOLOGY | Age: 61
Discharge: HOME | End: 2021-10-03
Payer: COMMERCIAL

## 2021-10-01 DIAGNOSIS — J95.811 POSTPROCEDURAL PNEUMOTHORAX: ICD-10-CM

## 2021-10-01 PROCEDURE — 71045 X-RAY EXAM CHEST 1 VIEW: CPT

## 2022-02-16 ENCOUNTER — HOSPITAL ENCOUNTER (OUTPATIENT)
Dept: NEUROLOGY | Age: 62
Discharge: HOME | End: 2022-02-16
Payer: COMMERCIAL

## 2022-02-16 PROCEDURE — 95909 NRV CNDJ TST 5-6 STUDIES: CPT | Performed by: PHYSICAL MEDICINE & REHABILITATION

## 2022-02-16 PROCEDURE — 95886 MUSC TEST DONE W/N TEST COMP: CPT | Performed by: PHYSICAL MEDICINE & REHABILITATION

## 2022-02-21 ENCOUNTER — HOSPITAL ENCOUNTER (OUTPATIENT)
Dept: NEUROLOGY | Age: 62
Discharge: HOME | End: 2022-02-21
Payer: COMMERCIAL

## 2022-02-21 PROCEDURE — 95912 NRV CNDJ TEST 11-12 STUDIES: CPT | Performed by: PHYSICAL MEDICINE & REHABILITATION

## 2022-02-21 PROCEDURE — 95886 MUSC TEST DONE W/N TEST COMP: CPT | Performed by: PHYSICAL MEDICINE & REHABILITATION

## 2023-04-04 ENCOUNTER — HOSPITAL ENCOUNTER (OUTPATIENT)
Dept: GENERAL RADIOLOGY | Age: 63
Discharge: HOME | End: 2023-04-06
Payer: COMMERCIAL

## 2023-04-04 ENCOUNTER — HOSPITAL ENCOUNTER (OUTPATIENT)
Age: 63
Discharge: HOME | End: 2023-04-06
Payer: COMMERCIAL

## 2023-04-04 DIAGNOSIS — R07.89 CHEST WALL PAIN: ICD-10-CM

## 2023-04-04 PROCEDURE — 71100 X-RAY EXAM RIBS UNI 2 VIEWS: CPT

## 2024-01-15 ENCOUNTER — HOSPITAL ENCOUNTER (OUTPATIENT)
Dept: GENERAL RADIOLOGY | Age: 64
Discharge: HOME | End: 2024-01-17
Payer: COMMERCIAL

## 2024-01-15 ENCOUNTER — HOSPITAL ENCOUNTER (OUTPATIENT)
Dept: PREADMISSION TESTING | Age: 64
Discharge: HOME | End: 2024-01-19
Payer: COMMERCIAL

## 2024-01-15 VITALS
WEIGHT: 121.03 LBS | HEART RATE: 51 BPM | DIASTOLIC BLOOD PRESSURE: 64 MMHG | OXYGEN SATURATION: 98 % | TEMPERATURE: 97.7 F | BODY MASS INDEX: 18.34 KG/M2 | SYSTOLIC BLOOD PRESSURE: 122 MMHG | HEIGHT: 68 IN | RESPIRATION RATE: 18 BRPM

## 2024-01-15 DIAGNOSIS — Z01.811 PRE-OP CHEST EXAM: ICD-10-CM

## 2024-01-15 LAB
ANION GAP SERPL CALCULATED.3IONS-SCNC: 10 MMOL/L (ref 9–17)
BASOPHILS # BLD: 0.04 K/UL (ref 0–0.2)
BASOPHILS NFR BLD: 1 % (ref 0–2)
BILIRUB UR QL STRIP: NEGATIVE
BUN SERPL-MCNC: 14 MG/DL (ref 8–23)
CHLORIDE SERPL-SCNC: 98 MMOL/L (ref 98–107)
CLARITY UR: CLEAR
CO2 SERPL-SCNC: 27 MMOL/L (ref 20–31)
COLOR UR: YELLOW
CREAT SERPL-MCNC: 0.7 MG/DL (ref 0.7–1.2)
EOSINOPHIL # BLD: 0.06 K/UL (ref 0–0.44)
EOSINOPHILS RELATIVE PERCENT: 1 % (ref 1–4)
ERYTHROCYTE [DISTWIDTH] IN BLOOD BY AUTOMATED COUNT: 14 % (ref 11.8–14.4)
GFR SERPL CREATININE-BSD FRML MDRD: >60 ML/MIN/1.73M2
GLUCOSE UR STRIP-MCNC: NEGATIVE MG/DL
HCT VFR BLD AUTO: 38.9 % (ref 40.7–50.3)
HGB BLD-MCNC: 13.1 G/DL (ref 13–17)
HGB UR QL STRIP.AUTO: NEGATIVE
IMM GRANULOCYTES # BLD AUTO: 0.04 K/UL (ref 0–0.3)
IMM GRANULOCYTES NFR BLD: 1 %
INR PPP: 1
KETONES UR STRIP-MCNC: NEGATIVE MG/DL
LEUKOCYTE ESTERASE UR QL STRIP: NEGATIVE
LYMPHOCYTES NFR BLD: 0.9 K/UL (ref 1.1–3.7)
LYMPHOCYTES RELATIVE PERCENT: 12 % (ref 24–43)
MCH RBC QN AUTO: 33.2 PG (ref 25.2–33.5)
MCHC RBC AUTO-ENTMCNC: 33.7 G/DL (ref 28.4–34.8)
MCV RBC AUTO: 98.7 FL (ref 82.6–102.9)
MONOCYTES NFR BLD: 0.71 K/UL (ref 0.1–1.2)
MONOCYTES NFR BLD: 9 % (ref 3–12)
NEUTROPHILS NFR BLD: 76 % (ref 36–65)
NEUTS SEG NFR BLD: 5.96 K/UL (ref 1.5–8.1)
NITRITE UR QL STRIP: NEGATIVE
NRBC BLD-RTO: 0 PER 100 WBC
PARTIAL THROMBOPLASTIN TIME: 30 SEC (ref 23.9–33.8)
PH UR STRIP: 7 [PH] (ref 5–8)
PLATELET # BLD AUTO: 262 K/UL (ref 138–453)
PMV BLD AUTO: 9.3 FL (ref 8.1–13.5)
POTASSIUM SERPL-SCNC: 4.5 MMOL/L (ref 3.7–5.3)
PROT UR STRIP-MCNC: NEGATIVE MG/DL
PROTHROMBIN TIME: 13.3 SEC (ref 11.5–14.2)
RBC # BLD AUTO: 3.94 M/UL (ref 4.21–5.77)
SODIUM SERPL-SCNC: 135 MMOL/L (ref 135–144)
SP GR UR STRIP: 1.02 (ref 1–1.03)
UROBILINOGEN UR STRIP-ACNC: NORMAL EU/DL (ref 0–1)
WBC OTHER # BLD: 7.7 K/UL (ref 3.5–11.3)

## 2024-01-15 PROCEDURE — 85730 THROMBOPLASTIN TIME PARTIAL: CPT

## 2024-01-15 PROCEDURE — 82565 ASSAY OF CREATININE: CPT

## 2024-01-15 PROCEDURE — 85610 PROTHROMBIN TIME: CPT

## 2024-01-15 PROCEDURE — 81003 URINALYSIS AUTO W/O SCOPE: CPT

## 2024-01-15 PROCEDURE — 86900 BLOOD TYPING SEROLOGIC ABO: CPT

## 2024-01-15 PROCEDURE — 84520 ASSAY OF UREA NITROGEN: CPT

## 2024-01-15 PROCEDURE — 36415 COLL VENOUS BLD VENIPUNCTURE: CPT

## 2024-01-15 PROCEDURE — 86901 BLOOD TYPING SEROLOGIC RH(D): CPT

## 2024-01-15 PROCEDURE — 85025 COMPLETE CBC W/AUTO DIFF WBC: CPT

## 2024-01-15 PROCEDURE — 86850 RBC ANTIBODY SCREEN: CPT

## 2024-01-15 PROCEDURE — 80051 ELECTROLYTE PANEL: CPT

## 2024-01-15 PROCEDURE — 71046 X-RAY EXAM CHEST 2 VIEWS: CPT

## 2024-01-15 RX ORDER — ALBUTEROL SULFATE 90 UG/1
2 AEROSOL, METERED RESPIRATORY (INHALATION) EVERY 4 HOURS PRN
COMMUNITY

## 2024-01-15 RX ORDER — ATORVASTATIN CALCIUM 40 MG/1
40 TABLET, FILM COATED ORAL DAILY
COMMUNITY

## 2024-01-15 RX ORDER — CLOPIDOGREL BISULFATE 75 MG/1
75 TABLET ORAL DAILY
COMMUNITY

## 2024-01-15 RX ORDER — LISINOPRIL 5 MG/1
5 TABLET ORAL DAILY
COMMUNITY

## 2024-01-15 RX ORDER — ASPIRIN 81 MG/1
81 TABLET ORAL DAILY
COMMUNITY

## 2024-01-15 ASSESSMENT — PAIN DESCRIPTION - LOCATION: LOCATION: BACK;NECK;LEG

## 2024-01-15 ASSESSMENT — PAIN DESCRIPTION - FREQUENCY: FREQUENCY: CONTINUOUS

## 2024-01-15 ASSESSMENT — PAIN DESCRIPTION - ORIENTATION: ORIENTATION: RIGHT;LEFT

## 2024-01-15 ASSESSMENT — PAIN DESCRIPTION - PAIN TYPE: TYPE: CHRONIC PAIN

## 2024-01-15 ASSESSMENT — PAIN DESCRIPTION - DESCRIPTORS: DESCRIPTORS: ACHING;BURNING;SHARP;THROBBING

## 2024-01-15 NOTE — PRE-PROCEDURE INSTRUCTIONS
On the Day of Your Surgery, Tuesday, 2/13/24, Please Arrive At 7:30 AM     Enter the hospital through the Main Entrance, take the lobby elevators to the second floor and check in at the Surgery Registration desk.     Continue to take your home medications as you normally do up to and including the night before surgery with the exception of blood thinning medications.    Blood Thinning Medications:  Please stop prescription blood thinning medications such as Apixaban (Eliquis); Clopidogrel (Plavix); Dabigatran (Pradaxa); Prasugrel (Effient); Rivaroxaban (Xarelto); Ticagrelor (Brilinta); Warfarin (Coumadin) only as directed by your surgeon and/or the prescribing physician    Some common examples of other medications that can thin your blood are: Aspirin, Ibuprofen (Advil, Motrin), Naproxen (Aleve), Meloxicam (Mobic), Celecoxib (Celebrex), Fish Oil, many Herbal Supplements.  These medications should usually be stopped at least 7 days prior to surgery.    The patient reported Dr Ramires's office told patient to stop clopidogrel 1 week prior to procedure. I instructed pt to ask Dr Ramires and or prescribing Dr about baby aspirin and when to stop safely prior to procedure    Tylenol is OK to take for pain the week prior to surgery.    Failure to stop certain medications may interfere with your scheduled surgery.    If you receive instructions from your surgeon regarding what medications to stop prior to surgery, please follow those specific instructions.    If You Have Diabetes:  Do not take any of your diabetic medications, (injectables or by mouth) the morning of surgery unless otherwise instructed by the doctor who manages your diabetes. If you are taking insulin, contact the doctor the manages your diabetes for instructions about any changes to your insulin dosages the day before surgery.      Please take the following medication(s) the day of surgery with small sips of water:              Use anoro inhaler morning of  surgery.  You can take an oxycodone if needed morning of surgery.      Please use your inhaler(s) if needed and bring your inhaler(s) from home the day of surgery. Bring albuterol inhaler day of surgery    Showering Before Surgery:     You can play an important role in your own health by carefully washing before surgery. Shower the night before and the morning of surgery using the instructions below. If you are allergic to Chlorhexidine Gluconate (CHG) use antibacterial soap instead.    If you were given Chlorhexidine soap, please follow the instructions included with the soap to shower the night before and the morning of surgery.  If you were not given Chlorhexidine, please shower or bathe the morning of surgery using an antibacterial soap.  Please dress in clean clothing after showering.     General information about Chlorhexidine Gluconate (CHG)   * It should not be used on hair, face, ears, genital area or skin that is not intact.   * It should not be used if breast feeding.   * It should not be used if you allergic to CHG.   * See the bottle for additional information.    Do Not apply any powder, deodorant, lotion/cream/oil, perfume/aftershave, cosmetics, or alcohol-based skin or hair products after showering.    Do Not shave near the planned surgical site unless specifically instructed to.     1. Wash your hair using your normal shampoo and rinse.   2. Wash your face and genital area (privates) using your own shampoo and rinse.   3. Turn off the shower water and pour half of the bottle of CHG onto a clean washcloth.   4. Wash your body from neck down to toes. Pay special attention to your surgical site.   5. Leave the CHG on your skin for 5 minutes and rinse it off.   6. Pat dry with a clean towel, sleep in clean clothes and clean sheets.   7. Do not shave near the surgical site.   8. Repeat process the morning of surgery.    CHG may cause dry skin, but should not cause redness, rash, or intense itching. If an

## 2024-01-15 NOTE — H&P
History and Physical Service   [facility]    HISTORY AND PHYSICAL EXAMINATION            Date of Evaluation: 1/15/2024  Patient name:  Nato Beckham  MRN:   [id number]  YOB: 1960  PCP:    Ricardo Khalil MD    History Obtained From:     Patient, medical records    History of Present Illness:     This is Nato Beckham a 64 y.o. male who presents for a pre-admission testing appointment for an upcoming AORTO BIFEM BYPASS - CELLSAVER, BILATERAL FEMORAL ENDARTERECTOMY by Ras Ramires MD scheduled on 2/13/2024 at 0930 due to iliac artery occlusion. Patient's chief complaint is bilateral leg pain which has become severe when walking. He states the pain is made worse when carrying objects. Patient states pain started after his neck surgery in 2021. He has to sit down after walking any distance. Pain is minimally relieved with Percocet and Gabapentin. Patient had outpatient CTA abdomen pelvis which showed severe arteriovascular disease. He was referred to Dr. Ramires and recommended surgical intervention. Patient had outpatient stress test and echocardiogram for cardiac clearance (in paper chart).     Functional Capacity per pt:  1) Pt is able to walk 2 city blocks on level ground without SOB. Patient has difficulty walking long distances due to leg pain.  2) Pt is unable to climb 2 flights of stairs due to leg pain.    Past Medical History:     Past Medical History:   Diagnosis Date    Arthralgia     Bilateral iliac artery occlusion (HCC)     Bruises easily     takes blood thinners    Cervical myelopathy (HCC)     COPD (chronic obstructive pulmonary disease) (HCC)     Dr Pratt for Pulmonology    DDD (degenerative disc disease), lumbar     Hyperlipidemia     Hypertension     Leg pain, bilateral     left leg is worse per patient    Lumbar disc herniation     Retrolisthesis     Smoker     Spondylolisthesis of lumbar region     Weight loss         Past Surgical History:

## 2024-02-12 ENCOUNTER — ANESTHESIA EVENT (OUTPATIENT)
Dept: OPERATING ROOM | Age: 64
End: 2024-02-12
Payer: COMMERCIAL

## 2024-02-13 ENCOUNTER — APPOINTMENT (OUTPATIENT)
Dept: GENERAL RADIOLOGY | Age: 64
DRG: 271 | End: 2024-02-13
Attending: SURGERY
Payer: COMMERCIAL

## 2024-02-13 ENCOUNTER — HOSPITAL ENCOUNTER (INPATIENT)
Age: 64
LOS: 4 days | Discharge: HOME | DRG: 271 | End: 2024-02-17
Attending: SURGERY | Admitting: SURGERY
Payer: COMMERCIAL

## 2024-02-13 ENCOUNTER — HOSPITAL ENCOUNTER (OUTPATIENT)
Age: 64
Discharge: HOME | End: 2024-02-15
Attending: ANESTHESIOLOGY

## 2024-02-13 ENCOUNTER — ANESTHESIA (OUTPATIENT)
Dept: OPERATING ROOM | Age: 64
End: 2024-02-13
Payer: COMMERCIAL

## 2024-02-13 DIAGNOSIS — I74.5 ILIAC ARTERY OCCLUSION (HCC): ICD-10-CM

## 2024-02-13 DIAGNOSIS — Z95.828 S/P AORTOBIFEMORAL BYPASS SURGERY: ICD-10-CM

## 2024-02-13 DIAGNOSIS — I70.0 AORTIC ATHEROSCLEROSIS (HCC): Primary | ICD-10-CM

## 2024-02-13 PROBLEM — I74.09 AORTOILIAC OCCLUSIVE DISEASE (HCC): Status: ACTIVE | Noted: 2024-02-13

## 2024-02-13 LAB
BUN BLD-MCNC: 10 MG/DL (ref 8–26)
CA-I BLD-SCNC: 1.13 MMOL/L (ref 1.15–1.33)
CA-I BLD-SCNC: 1.14 MMOL/L (ref 1.15–1.33)
CA-I BLD-SCNC: 1.32 MMOL/L (ref 1.15–1.33)
CHLORIDE BLD-SCNC: 107 MMOL/L (ref 98–107)
CHLORIDE BLD-SCNC: 111 MMOL/L (ref 98–107)
CHLORIDE BLD-SCNC: 112 MMOL/L (ref 98–107)
CO2 BLD CALC-SCNC: 20 MMOL/L (ref 22–30)
CO2 BLD CALC-SCNC: 21 MMOL/L (ref 22–30)
CO2 BLD CALC-SCNC: 21 MMOL/L (ref 22–30)
CO2 BLD CALC-SCNC: 22 MMOL/L (ref 22–30)
EGFR, POC: >60 ML/MIN/1.73M2
ERYTHROCYTE [DISTWIDTH] IN BLOOD BY AUTOMATED COUNT: 14 % (ref 11.8–14.4)
GLUCOSE BLD-MCNC: 147 MG/DL (ref 74–100)
GLUCOSE BLD-MCNC: 165 MG/DL (ref 74–100)
GLUCOSE BLD-MCNC: 194 MG/DL (ref 74–100)
HCT VFR BLD AUTO: 29 % (ref 41–53)
HCT VFR BLD AUTO: 32 % (ref 41–53)
HCT VFR BLD AUTO: 32 % (ref 41–53)
HCT VFR BLD AUTO: 33.9 % (ref 40.7–50.3)
HGB BLD-MCNC: 10.6 G/DL (ref 13–17)
MCH RBC QN AUTO: 32.7 PG (ref 25.2–33.5)
MCHC RBC AUTO-ENTMCNC: 31.3 G/DL (ref 28–38)
MCV RBC AUTO: 104.6 FL (ref 82.6–102.9)
NEGATIVE BASE EXCESS, ART: 5.6 MMOL/L (ref 0–2)
NEGATIVE BASE EXCESS, ART: 7.4 MMOL/L (ref 0–2)
PATIENT TEMP: 37
PLATELET # BLD AUTO: 115 K/UL (ref 138–453)
PMV BLD AUTO: 9.3 FL (ref 8.1–13.5)
POC ANION GAP: 10 MMOL/L (ref 7–16)
POC ANION GAP: 11 MMOL/L (ref 7–16)
POC ANION GAP: 8 MMOL/L (ref 7–16)
POC CREATININE: 0.7 MG/DL (ref 0.51–1.19)
POC HCO3: 19.5 MMOL/L (ref 21–28)
POC HCO3: 21.2 MMOL/L (ref 21–28)
POC HEMOGLOBIN (CALC): 10.8 G/DL (ref 13.5–17.5)
POC HEMOGLOBIN (CALC): 11 G/DL (ref 13.5–17.5)
POC HEMOGLOBIN (CALC): 9.8 G/DL (ref 13.5–17.5)
POC O2 SATURATION: 76 % (ref 94–98)
POC O2 SATURATION: 99.4 % (ref 94–98)
POC PCO2: 44.7 MM HG (ref 35–48)
POC PCO2: 47.9 MM HG (ref 35–48)
POC PH: 7.25 (ref 7.35–7.45)
POC PH: 7.25 (ref 7.35–7.45)
POC PO2: 187.6 MM HG (ref 83–108)
POC PO2: 47.4 MM HG (ref 83–108)
POTASSIUM BLD-SCNC: 4.5 MMOL/L (ref 3.5–4.5)
POTASSIUM BLD-SCNC: 4.8 MMOL/L (ref 3.5–4.5)
POTASSIUM BLD-SCNC: 5.1 MMOL/L (ref 3.5–4.5)
RBC # BLD AUTO: 3.24 M/UL (ref 4.21–5.77)
SODIUM BLD-SCNC: 139 MMOL/L (ref 138–146)
SODIUM BLD-SCNC: 139 MMOL/L (ref 138–146)
SODIUM BLD-SCNC: 141 MMOL/L (ref 138–146)
WBC OTHER # BLD: 14.1 K/UL (ref 3.5–11.3)

## 2024-02-13 PROCEDURE — 7100000001 HC PACU RECOVERY - ADDTL 15 MIN: Performed by: SURGERY

## 2024-02-13 PROCEDURE — 04CL0ZZ EXTIRPATION OF MATTER FROM LEFT FEMORAL ARTERY, OPEN APPROACH: ICD-10-PCS | Performed by: SURGERY

## 2024-02-13 PROCEDURE — 3600000002 HC SURGERY LEVEL 2 BASE: Performed by: SURGERY

## 2024-02-13 PROCEDURE — P9041 ALBUMIN (HUMAN),5%, 50ML: HCPCS | Performed by: NURSE ANESTHETIST, CERTIFIED REGISTERED

## 2024-02-13 PROCEDURE — 88304 TISSUE EXAM BY PATHOLOGIST: CPT

## 2024-02-13 PROCEDURE — 2580000003 HC RX 258: Performed by: SURGERY

## 2024-02-13 PROCEDURE — 36415 COLL VENOUS BLD VENIPUNCTURE: CPT

## 2024-02-13 PROCEDURE — C1768 GRAFT, VASCULAR: HCPCS | Performed by: SURGERY

## 2024-02-13 PROCEDURE — 04CC0ZZ EXTIRPATION OF MATTER FROM RIGHT COMMON ILIAC ARTERY, OPEN APPROACH: ICD-10-PCS | Performed by: SURGERY

## 2024-02-13 PROCEDURE — 3700000001 HC ADD 15 MINUTES (ANESTHESIA): Performed by: SURGERY

## 2024-02-13 PROCEDURE — 82565 ASSAY OF CREATININE: CPT

## 2024-02-13 PROCEDURE — 04U00JZ SUPPLEMENT ABDOMINAL AORTA WITH SYNTHETIC SUBSTITUTE, OPEN APPROACH: ICD-10-PCS | Performed by: SURGERY

## 2024-02-13 PROCEDURE — 3600000012 HC SURGERY LEVEL 2 ADDTL 15MIN: Performed by: SURGERY

## 2024-02-13 PROCEDURE — 2500000003 HC RX 250 WO HCPCS: Performed by: NURSE ANESTHETIST, CERTIFIED REGISTERED

## 2024-02-13 PROCEDURE — 82947 ASSAY GLUCOSE BLOOD QUANT: CPT

## 2024-02-13 PROCEDURE — 04CK0ZZ EXTIRPATION OF MATTER FROM RIGHT FEMORAL ARTERY, OPEN APPROACH: ICD-10-PCS | Performed by: SURGERY

## 2024-02-13 PROCEDURE — 3700000000 HC ANESTHESIA ATTENDED CARE: Performed by: SURGERY

## 2024-02-13 PROCEDURE — 04CD0ZZ EXTIRPATION OF MATTER FROM LEFT COMMON ILIAC ARTERY, OPEN APPROACH: ICD-10-PCS | Performed by: SURGERY

## 2024-02-13 PROCEDURE — 85027 COMPLETE CBC AUTOMATED: CPT

## 2024-02-13 PROCEDURE — 2580000003 HC RX 258: Performed by: NURSE ANESTHETIST, CERTIFIED REGISTERED

## 2024-02-13 PROCEDURE — 6370000000 HC RX 637 (ALT 250 FOR IP): Performed by: SURGERY

## 2024-02-13 PROCEDURE — 6360000002 HC RX W HCPCS: Performed by: SURGERY

## 2024-02-13 PROCEDURE — 6360000002 HC RX W HCPCS: Performed by: ANESTHESIOLOGY

## 2024-02-13 PROCEDURE — 85014 HEMATOCRIT: CPT

## 2024-02-13 PROCEDURE — A4217 STERILE WATER/SALINE, 500 ML: HCPCS | Performed by: SURGERY

## 2024-02-13 PROCEDURE — 7100000000 HC PACU RECOVERY - FIRST 15 MIN: Performed by: SURGERY

## 2024-02-13 PROCEDURE — 6360000002 HC RX W HCPCS: Performed by: NURSE ANESTHETIST, CERTIFIED REGISTERED

## 2024-02-13 PROCEDURE — 71045 X-RAY EXAM CHEST 1 VIEW: CPT

## 2024-02-13 PROCEDURE — 82803 BLOOD GASES ANY COMBINATION: CPT

## 2024-02-13 PROCEDURE — 2500000003 HC RX 250 WO HCPCS: Performed by: SURGERY

## 2024-02-13 PROCEDURE — 94761 N-INVAS EAR/PLS OXIMETRY MLT: CPT

## 2024-02-13 PROCEDURE — 2700000000 HC OXYGEN THERAPY PER DAY

## 2024-02-13 PROCEDURE — 2580000003 HC RX 258: Performed by: ANESTHESIOLOGY

## 2024-02-13 PROCEDURE — 84520 ASSAY OF UREA NITROGEN: CPT

## 2024-02-13 PROCEDURE — C1894 INTRO/SHEATH, NON-LASER: HCPCS | Performed by: SURGERY

## 2024-02-13 PROCEDURE — 6360000002 HC RX W HCPCS: Performed by: FAMILY MEDICINE

## 2024-02-13 PROCEDURE — 04100JK BYPASS ABDOMINAL AORTA TO BILATERAL FEMORAL ARTERIES WITH SYNTHETIC SUBSTITUTE, OPEN APPROACH: ICD-10-PCS | Performed by: SURGERY

## 2024-02-13 PROCEDURE — 82374 ASSAY BLOOD CARBON DIOXIDE: CPT

## 2024-02-13 PROCEDURE — 86920 COMPATIBILITY TEST SPIN: CPT

## 2024-02-13 PROCEDURE — 82330 ASSAY OF CALCIUM: CPT

## 2024-02-13 PROCEDURE — 80051 ELECTROLYTE PANEL: CPT

## 2024-02-13 PROCEDURE — 37799 UNLISTED PX VASCULAR SURGERY: CPT

## 2024-02-13 PROCEDURE — 2000000000 HC ICU R&B

## 2024-02-13 PROCEDURE — 2709999900 HC NON-CHARGEABLE SUPPLY: Performed by: SURGERY

## 2024-02-13 DEVICE — IMPLANTABLE DEVICE: Type: IMPLANTABLE DEVICE | Site: ABDOMEN | Status: FUNCTIONAL

## 2024-02-13 RX ORDER — PROTAMINE SULFATE 10 MG/ML
INJECTION, SOLUTION INTRAVENOUS PRN
Status: DISCONTINUED | OUTPATIENT
Start: 2024-02-13 | End: 2024-02-13 | Stop reason: SDUPTHER

## 2024-02-13 RX ORDER — DEXAMETHASONE SODIUM PHOSPHATE 10 MG/ML
INJECTION, SOLUTION INTRAMUSCULAR; INTRAVENOUS PRN
Status: DISCONTINUED | OUTPATIENT
Start: 2024-02-13 | End: 2024-02-13 | Stop reason: SDUPTHER

## 2024-02-13 RX ORDER — FENTANYL CITRATE 50 UG/ML
25 INJECTION, SOLUTION INTRAMUSCULAR; INTRAVENOUS EVERY 5 MIN PRN
Status: DISCONTINUED | OUTPATIENT
Start: 2024-02-13 | End: 2024-02-13 | Stop reason: HOSPADM

## 2024-02-13 RX ORDER — HEPARIN SODIUM 1000 [USP'U]/ML
INJECTION, SOLUTION INTRAVENOUS; SUBCUTANEOUS PRN
Status: DISCONTINUED | OUTPATIENT
Start: 2024-02-13 | End: 2024-02-13 | Stop reason: SDUPTHER

## 2024-02-13 RX ORDER — SODIUM CHLORIDE 9 MG/ML
INJECTION, SOLUTION INTRAVENOUS CONTINUOUS PRN
Status: DISCONTINUED | OUTPATIENT
Start: 2024-02-13 | End: 2024-02-13 | Stop reason: SDUPTHER

## 2024-02-13 RX ORDER — LORAZEPAM 2 MG/ML
0.5 INJECTION INTRAMUSCULAR 3 TIMES DAILY PRN
Status: DISCONTINUED | OUTPATIENT
Start: 2024-02-13 | End: 2024-02-17 | Stop reason: HOSPADM

## 2024-02-13 RX ORDER — SODIUM CHLORIDE 0.9 % (FLUSH) 0.9 %
5-40 SYRINGE (ML) INJECTION PRN
Status: DISCONTINUED | OUTPATIENT
Start: 2024-02-13 | End: 2024-02-17 | Stop reason: HOSPADM

## 2024-02-13 RX ORDER — FENTANYL CITRATE 50 UG/ML
50 INJECTION, SOLUTION INTRAMUSCULAR; INTRAVENOUS EVERY 10 MIN PRN
Status: DISCONTINUED | OUTPATIENT
Start: 2024-02-13 | End: 2024-02-13 | Stop reason: HOSPADM

## 2024-02-13 RX ORDER — POTASSIUM CHLORIDE 29.8 MG/ML
20 INJECTION INTRAVENOUS PRN
Status: DISCONTINUED | OUTPATIENT
Start: 2024-02-13 | End: 2024-02-17 | Stop reason: HOSPADM

## 2024-02-13 RX ORDER — HYDROMORPHONE HYDROCHLORIDE 1 MG/ML
0.25 INJECTION, SOLUTION INTRAMUSCULAR; INTRAVENOUS; SUBCUTANEOUS EVERY 10 MIN PRN
Status: COMPLETED | OUTPATIENT
Start: 2024-02-13 | End: 2024-02-14

## 2024-02-13 RX ORDER — FENTANYL CITRATE 50 UG/ML
INJECTION, SOLUTION INTRAMUSCULAR; INTRAVENOUS PRN
Status: DISCONTINUED | OUTPATIENT
Start: 2024-02-13 | End: 2024-02-13 | Stop reason: SDUPTHER

## 2024-02-13 RX ORDER — LABETALOL HYDROCHLORIDE 5 MG/ML
10 INJECTION, SOLUTION INTRAVENOUS
Status: DISCONTINUED | OUTPATIENT
Start: 2024-02-13 | End: 2024-02-17 | Stop reason: HOSPADM

## 2024-02-13 RX ORDER — CEFAZOLIN SODIUM 1 G/3ML
INJECTION, POWDER, FOR SOLUTION INTRAMUSCULAR; INTRAVENOUS PRN
Status: DISCONTINUED | OUTPATIENT
Start: 2024-02-13 | End: 2024-02-13 | Stop reason: SDUPTHER

## 2024-02-13 RX ORDER — ALBUMIN, HUMAN INJ 5% 5 %
SOLUTION INTRAVENOUS PRN
Status: DISCONTINUED | OUTPATIENT
Start: 2024-02-13 | End: 2024-02-13 | Stop reason: SDUPTHER

## 2024-02-13 RX ORDER — LORAZEPAM 2 MG/ML
0.25 INJECTION INTRAMUSCULAR 3 TIMES DAILY PRN
Status: DISCONTINUED | OUTPATIENT
Start: 2024-02-13 | End: 2024-02-13

## 2024-02-13 RX ORDER — SODIUM CHLORIDE, SODIUM LACTATE, POTASSIUM CHLORIDE, CALCIUM CHLORIDE 600; 310; 30; 20 MG/100ML; MG/100ML; MG/100ML; MG/100ML
INJECTION, SOLUTION INTRAVENOUS CONTINUOUS
Status: DISCONTINUED | OUTPATIENT
Start: 2024-02-13 | End: 2024-02-17

## 2024-02-13 RX ORDER — SODIUM CHLORIDE 9 MG/ML
INJECTION, SOLUTION INTRAVENOUS PRN
Status: DISCONTINUED | OUTPATIENT
Start: 2024-02-13 | End: 2024-02-17 | Stop reason: HOSPADM

## 2024-02-13 RX ORDER — POTASSIUM CHLORIDE 7.45 MG/ML
10 INJECTION INTRAVENOUS PRN
Status: DISCONTINUED | OUTPATIENT
Start: 2024-02-13 | End: 2024-02-17 | Stop reason: HOSPADM

## 2024-02-13 RX ORDER — HYDROMORPHONE HYDROCHLORIDE 1 MG/ML
0.5 INJECTION, SOLUTION INTRAMUSCULAR; INTRAVENOUS; SUBCUTANEOUS ONCE
Status: COMPLETED | OUTPATIENT
Start: 2024-02-13 | End: 2024-02-13

## 2024-02-13 RX ORDER — LIDOCAINE HYDROCHLORIDE 10 MG/ML
1 INJECTION, SOLUTION EPIDURAL; INFILTRATION; INTRACAUDAL; PERINEURAL
Status: DISCONTINUED | OUTPATIENT
Start: 2024-02-13 | End: 2024-02-13 | Stop reason: HOSPADM

## 2024-02-13 RX ORDER — SODIUM CHLORIDE 0.9 % (FLUSH) 0.9 %
5-40 SYRINGE (ML) INJECTION EVERY 12 HOURS SCHEDULED
Status: DISCONTINUED | OUTPATIENT
Start: 2024-02-13 | End: 2024-02-17 | Stop reason: HOSPADM

## 2024-02-13 RX ORDER — SODIUM CHLORIDE, SODIUM LACTATE, POTASSIUM CHLORIDE, CALCIUM CHLORIDE 600; 310; 30; 20 MG/100ML; MG/100ML; MG/100ML; MG/100ML
INJECTION, SOLUTION INTRAVENOUS CONTINUOUS
Status: DISCONTINUED | OUTPATIENT
Start: 2024-02-13 | End: 2024-02-13

## 2024-02-13 RX ORDER — MAGNESIUM SULFATE 1 G/100ML
1000 INJECTION INTRAVENOUS PRN
Status: DISCONTINUED | OUTPATIENT
Start: 2024-02-13 | End: 2024-02-17 | Stop reason: HOSPADM

## 2024-02-13 RX ORDER — OXYCODONE HYDROCHLORIDE 5 MG/1
5 TABLET ORAL
Status: DISCONTINUED | OUTPATIENT
Start: 2024-02-13 | End: 2024-02-13 | Stop reason: HOSPADM

## 2024-02-13 RX ORDER — HYDROMORPHONE HYDROCHLORIDE 2 MG/ML
INJECTION, SOLUTION INTRAMUSCULAR; INTRAVENOUS; SUBCUTANEOUS PRN
Status: DISCONTINUED | OUTPATIENT
Start: 2024-02-13 | End: 2024-02-13 | Stop reason: SDUPTHER

## 2024-02-13 RX ORDER — MORPHINE SULFATE 4 MG/ML
4 INJECTION, SOLUTION INTRAMUSCULAR; INTRAVENOUS
Status: DISCONTINUED | OUTPATIENT
Start: 2024-02-13 | End: 2024-02-17

## 2024-02-13 RX ORDER — PROPOFOL 10 MG/ML
INJECTION, EMULSION INTRAVENOUS PRN
Status: DISCONTINUED | OUTPATIENT
Start: 2024-02-13 | End: 2024-02-13 | Stop reason: SDUPTHER

## 2024-02-13 RX ORDER — MORPHINE SULFATE 2 MG/ML
2 INJECTION, SOLUTION INTRAMUSCULAR; INTRAVENOUS
Status: DISCONTINUED | OUTPATIENT
Start: 2024-02-13 | End: 2024-02-17

## 2024-02-13 RX ORDER — SODIUM CHLORIDE 9 MG/ML
INJECTION, SOLUTION INTRAVENOUS CONTINUOUS
Status: DISCONTINUED | OUTPATIENT
Start: 2024-02-13 | End: 2024-02-13

## 2024-02-13 RX ORDER — MANNITOL 250 MG/ML
INJECTION, SOLUTION INTRAVENOUS PRN
Status: DISCONTINUED | OUTPATIENT
Start: 2024-02-13 | End: 2024-02-13 | Stop reason: SDUPTHER

## 2024-02-13 RX ORDER — PHENYLEPHRINE HCL IN 0.9% NACL 1 MG/10 ML
SYRINGE (ML) INTRAVENOUS PRN
Status: DISCONTINUED | OUTPATIENT
Start: 2024-02-13 | End: 2024-02-13 | Stop reason: SDUPTHER

## 2024-02-13 RX ORDER — HYDRALAZINE HYDROCHLORIDE 20 MG/ML
10 INJECTION INTRAMUSCULAR; INTRAVENOUS
Status: DISCONTINUED | OUTPATIENT
Start: 2024-02-13 | End: 2024-02-17 | Stop reason: HOSPADM

## 2024-02-13 RX ORDER — ROCURONIUM BROMIDE 10 MG/ML
INJECTION, SOLUTION INTRAVENOUS PRN
Status: DISCONTINUED | OUTPATIENT
Start: 2024-02-13 | End: 2024-02-13 | Stop reason: SDUPTHER

## 2024-02-13 RX ORDER — PHENYLEPHRINE HYDROCHLORIDE 10 MG/ML
INJECTION INTRAVENOUS PRN
Status: DISCONTINUED | OUTPATIENT
Start: 2024-02-13 | End: 2024-02-13

## 2024-02-13 RX ORDER — ONDANSETRON 2 MG/ML
4 INJECTION INTRAMUSCULAR; INTRAVENOUS
Status: DISCONTINUED | OUTPATIENT
Start: 2024-02-13 | End: 2024-02-13 | Stop reason: HOSPADM

## 2024-02-13 RX ORDER — ONDANSETRON 2 MG/ML
INJECTION INTRAMUSCULAR; INTRAVENOUS PRN
Status: DISCONTINUED | OUTPATIENT
Start: 2024-02-13 | End: 2024-02-13 | Stop reason: SDUPTHER

## 2024-02-13 RX ORDER — CALCIUM CHLORIDE 100 MG/ML
INJECTION INTRAVENOUS; INTRAVENTRICULAR PRN
Status: DISCONTINUED | OUTPATIENT
Start: 2024-02-13 | End: 2024-02-13 | Stop reason: SDUPTHER

## 2024-02-13 RX ORDER — SODIUM CHLORIDE, SODIUM LACTATE, POTASSIUM CHLORIDE, CALCIUM CHLORIDE 600; 310; 30; 20 MG/100ML; MG/100ML; MG/100ML; MG/100ML
INJECTION, SOLUTION INTRAVENOUS CONTINUOUS PRN
Status: DISCONTINUED | OUTPATIENT
Start: 2024-02-13 | End: 2024-02-13 | Stop reason: SDUPTHER

## 2024-02-13 RX ORDER — MIDAZOLAM HYDROCHLORIDE 1 MG/ML
INJECTION INTRAMUSCULAR; INTRAVENOUS PRN
Status: DISCONTINUED | OUTPATIENT
Start: 2024-02-13 | End: 2024-02-13 | Stop reason: SDUPTHER

## 2024-02-13 RX ORDER — LIDOCAINE HYDROCHLORIDE 20 MG/ML
INJECTION, SOLUTION EPIDURAL; INFILTRATION; INTRACAUDAL; PERINEURAL PRN
Status: DISCONTINUED | OUTPATIENT
Start: 2024-02-13 | End: 2024-02-13 | Stop reason: SDUPTHER

## 2024-02-13 RX ADMIN — Medication 200 MCG: at 12:19

## 2024-02-13 RX ADMIN — Medication 100 MCG: at 10:59

## 2024-02-13 RX ADMIN — SUGAMMADEX 200 MG: 100 INJECTION, SOLUTION INTRAVENOUS at 16:04

## 2024-02-13 RX ADMIN — SODIUM CHLORIDE, PRESERVATIVE FREE 10 ML: 5 INJECTION INTRAVENOUS at 22:52

## 2024-02-13 RX ADMIN — Medication 100 MCG: at 11:04

## 2024-02-13 RX ADMIN — FENTANYL CITRATE 50 MCG: 50 INJECTION, SOLUTION INTRAMUSCULAR; INTRAVENOUS at 09:48

## 2024-02-13 RX ADMIN — Medication 100 MCG: at 10:11

## 2024-02-13 RX ADMIN — Medication 2000 MG: at 10:30

## 2024-02-13 RX ADMIN — MORPHINE SULFATE 4 MG: 4 INJECTION, SOLUTION INTRAMUSCULAR; INTRAVENOUS at 20:10

## 2024-02-13 RX ADMIN — PROPOFOL 75 MG: 10 INJECTION, EMULSION INTRAVENOUS at 09:48

## 2024-02-13 RX ADMIN — PROTAMINE SULFATE 15 MG: 10 INJECTION, SOLUTION INTRAVENOUS at 15:12

## 2024-02-13 RX ADMIN — Medication 200 MCG: at 12:22

## 2024-02-13 RX ADMIN — FENTANYL CITRATE 50 MCG: 50 INJECTION, SOLUTION INTRAMUSCULAR; INTRAVENOUS at 11:10

## 2024-02-13 RX ADMIN — Medication 50 MCG: at 10:47

## 2024-02-13 RX ADMIN — ONDANSETRON 4 MG: 2 INJECTION INTRAMUSCULAR; INTRAVENOUS at 15:31

## 2024-02-13 RX ADMIN — ROCURONIUM BROMIDE 50 MG: 10 INJECTION, SOLUTION INTRAVENOUS at 09:48

## 2024-02-13 RX ADMIN — Medication 200 MCG: at 15:03

## 2024-02-13 RX ADMIN — Medication 100 MCG: at 12:09

## 2024-02-13 RX ADMIN — MANNITOL 12.5 G: 12.5 INJECTION, SOLUTION INTRAVENOUS at 12:02

## 2024-02-13 RX ADMIN — SODIUM CHLORIDE, POTASSIUM CHLORIDE, SODIUM LACTATE AND CALCIUM CHLORIDE: 600; 310; 30; 20 INJECTION, SOLUTION INTRAVENOUS at 18:56

## 2024-02-13 RX ADMIN — ALBUMIN (HUMAN) 25 G: 12.5 INJECTION, SOLUTION INTRAVENOUS at 13:23

## 2024-02-13 RX ADMIN — Medication 100 MCG: at 10:16

## 2024-02-13 RX ADMIN — SODIUM CHLORIDE, POTASSIUM CHLORIDE, SODIUM LACTATE AND CALCIUM CHLORIDE: 600; 310; 30; 20 INJECTION, SOLUTION INTRAVENOUS at 12:25

## 2024-02-13 RX ADMIN — FENTANYL CITRATE 50 MCG: 50 INJECTION INTRAMUSCULAR; INTRAVENOUS at 17:20

## 2024-02-13 RX ADMIN — Medication 100 MCG: at 10:31

## 2024-02-13 RX ADMIN — DEXAMETHASONE SODIUM PHOSPHATE 10 MG: 10 INJECTION, SOLUTION INTRAMUSCULAR; INTRAVENOUS at 10:10

## 2024-02-13 RX ADMIN — HEPARIN SODIUM 5000 UNITS: 1000 INJECTION INTRAVENOUS; SUBCUTANEOUS at 12:14

## 2024-02-13 RX ADMIN — Medication 200 MCG: at 15:05

## 2024-02-13 RX ADMIN — MORPHINE SULFATE 4 MG: 4 INJECTION, SOLUTION INTRAMUSCULAR; INTRAVENOUS at 22:21

## 2024-02-13 RX ADMIN — Medication 200 MCG: at 12:32

## 2024-02-13 RX ADMIN — MIDAZOLAM 1 MG: 1 INJECTION INTRAMUSCULAR; INTRAVENOUS at 09:43

## 2024-02-13 RX ADMIN — Medication 200 MCG: at 12:21

## 2024-02-13 RX ADMIN — PHENYLEPHRINE HYDROCHLORIDE 50 MCG/MIN: 10 INJECTION INTRAVENOUS at 12:34

## 2024-02-13 RX ADMIN — HYDROMORPHONE HYDROCHLORIDE 0.5 MG: 2 INJECTION, SOLUTION INTRAMUSCULAR; INTRAVENOUS; SUBCUTANEOUS at 16:16

## 2024-02-13 RX ADMIN — SODIUM CHLORIDE: 9 INJECTION, SOLUTION INTRAVENOUS at 10:00

## 2024-02-13 RX ADMIN — HYDROMORPHONE HYDROCHLORIDE 0.5 MG: 1 INJECTION, SOLUTION INTRAMUSCULAR; INTRAVENOUS; SUBCUTANEOUS at 17:10

## 2024-02-13 RX ADMIN — Medication 100 MCG: at 15:48

## 2024-02-13 RX ADMIN — ROCURONIUM BROMIDE 10 MG: 10 INJECTION, SOLUTION INTRAVENOUS at 11:49

## 2024-02-13 RX ADMIN — SODIUM CHLORIDE, POTASSIUM CHLORIDE, SODIUM LACTATE AND CALCIUM CHLORIDE: 600; 310; 30; 20 INJECTION, SOLUTION INTRAVENOUS at 13:15

## 2024-02-13 RX ADMIN — HYDROMORPHONE HYDROCHLORIDE 0.5 MG: 2 INJECTION, SOLUTION INTRAMUSCULAR; INTRAVENOUS; SUBCUTANEOUS at 16:05

## 2024-02-13 RX ADMIN — Medication 100 MCG: at 10:08

## 2024-02-13 RX ADMIN — Medication 2000 MG: at 19:55

## 2024-02-13 RX ADMIN — SODIUM CHLORIDE: 9 INJECTION, SOLUTION INTRAVENOUS at 14:37

## 2024-02-13 RX ADMIN — SODIUM CHLORIDE, POTASSIUM CHLORIDE, SODIUM LACTATE AND CALCIUM CHLORIDE: 600; 310; 30; 20 INJECTION, SOLUTION INTRAVENOUS at 23:38

## 2024-02-13 RX ADMIN — ROCURONIUM BROMIDE 10 MG: 10 INJECTION, SOLUTION INTRAVENOUS at 14:45

## 2024-02-13 RX ADMIN — HYDROMORPHONE HYDROCHLORIDE 0.25 MG: 1 INJECTION, SOLUTION INTRAMUSCULAR; INTRAVENOUS; SUBCUTANEOUS at 19:02

## 2024-02-13 RX ADMIN — Medication 100 MCG: at 09:59

## 2024-02-13 RX ADMIN — Medication 100 MCG: at 15:58

## 2024-02-13 RX ADMIN — HYDROMORPHONE HYDROCHLORIDE 0.5 MG: 2 INJECTION, SOLUTION INTRAMUSCULAR; INTRAVENOUS; SUBCUTANEOUS at 16:04

## 2024-02-13 RX ADMIN — ROCURONIUM BROMIDE 10 MG: 10 INJECTION, SOLUTION INTRAVENOUS at 12:49

## 2024-02-13 RX ADMIN — Medication 200 MCG: at 11:20

## 2024-02-13 RX ADMIN — MIDAZOLAM 1 MG: 1 INJECTION INTRAMUSCULAR; INTRAVENOUS at 09:45

## 2024-02-13 RX ADMIN — CALCIUM CHLORIDE 0.5 G: 100 INJECTION INTRAVENOUS; INTRAVENTRICULAR at 14:17

## 2024-02-13 RX ADMIN — ROCURONIUM BROMIDE 10 MG: 10 INJECTION, SOLUTION INTRAVENOUS at 14:00

## 2024-02-13 RX ADMIN — Medication 150 MCG: at 16:03

## 2024-02-13 RX ADMIN — HYDROMORPHONE HYDROCHLORIDE 0.5 MG: 1 INJECTION, SOLUTION INTRAMUSCULAR; INTRAVENOUS; SUBCUTANEOUS at 17:06

## 2024-02-13 RX ADMIN — ROCURONIUM BROMIDE 10 MG: 10 INJECTION, SOLUTION INTRAVENOUS at 12:25

## 2024-02-13 RX ADMIN — Medication 100 MCG: at 09:57

## 2024-02-13 RX ADMIN — FENTANYL CITRATE 25 MCG: 50 INJECTION INTRAMUSCULAR; INTRAVENOUS at 18:13

## 2024-02-13 RX ADMIN — Medication 100 MCG: at 10:21

## 2024-02-13 RX ADMIN — MANNITOL 12.5 G: 12.5 INJECTION, SOLUTION INTRAVENOUS at 12:06

## 2024-02-13 RX ADMIN — ALBUMIN (HUMAN) 25 G: 12.5 INJECTION, SOLUTION INTRAVENOUS at 15:27

## 2024-02-13 RX ADMIN — Medication 100 MCG: at 16:00

## 2024-02-13 RX ADMIN — ROCURONIUM BROMIDE 10 MG: 10 INJECTION, SOLUTION INTRAVENOUS at 13:15

## 2024-02-13 RX ADMIN — FENTANYL CITRATE 25 MCG: 50 INJECTION INTRAMUSCULAR; INTRAVENOUS at 17:43

## 2024-02-13 RX ADMIN — SODIUM CHLORIDE, POTASSIUM CHLORIDE, SODIUM LACTATE AND CALCIUM CHLORIDE: 600; 310; 30; 20 INJECTION, SOLUTION INTRAVENOUS at 09:08

## 2024-02-13 RX ADMIN — CEFAZOLIN 2 G: 1 INJECTION, POWDER, FOR SOLUTION INTRAMUSCULAR; INTRAVENOUS at 14:23

## 2024-02-13 RX ADMIN — LIDOCAINE HYDROCHLORIDE 60 MG: 20 INJECTION, SOLUTION EPIDURAL; INFILTRATION; INTRACAUDAL; PERINEURAL at 09:48

## 2024-02-13 RX ADMIN — ROCURONIUM BROMIDE 20 MG: 10 INJECTION, SOLUTION INTRAVENOUS at 10:55

## 2024-02-13 RX ADMIN — Medication 50 MCG: at 11:16

## 2024-02-13 RX ADMIN — LORAZEPAM 0.5 MG: 2 INJECTION INTRAMUSCULAR; INTRAVENOUS at 23:22

## 2024-02-13 RX ADMIN — FENTANYL CITRATE 50 MCG: 50 INJECTION, SOLUTION INTRAMUSCULAR; INTRAVENOUS at 16:40

## 2024-02-13 RX ADMIN — HYDROMORPHONE HYDROCHLORIDE 0.5 MG: 2 INJECTION, SOLUTION INTRAMUSCULAR; INTRAVENOUS; SUBCUTANEOUS at 16:09

## 2024-02-13 RX ADMIN — Medication 100 MCG: at 15:50

## 2024-02-13 ASSESSMENT — PAIN SCALES - GENERAL
PAINLEVEL_OUTOF10: 5
PAINLEVEL_OUTOF10: 8
PAINLEVEL_OUTOF10: 5
PAINLEVEL_OUTOF10: 6
PAINLEVEL_OUTOF10: 6
PAINLEVEL_OUTOF10: 8
PAINLEVEL_OUTOF10: 6
PAINLEVEL_OUTOF10: 8

## 2024-02-13 ASSESSMENT — PAIN DESCRIPTION - DESCRIPTORS
DESCRIPTORS: ACHING
DESCRIPTORS: SORE
DESCRIPTORS: ACHING
DESCRIPTORS: SHARP
DESCRIPTORS: DISCOMFORT
DESCRIPTORS: SHARP
DESCRIPTORS: SHARP
DESCRIPTORS: DISCOMFORT
DESCRIPTORS: SHARP

## 2024-02-13 ASSESSMENT — PAIN - FUNCTIONAL ASSESSMENT
PAIN_FUNCTIONAL_ASSESSMENT: FACE, LEGS, ACTIVITY, CRY, AND CONSOLABILITY (FLACC)
PAIN_FUNCTIONAL_ASSESSMENT: PREVENTS OR INTERFERES SOME ACTIVE ACTIVITIES AND ADLS
PAIN_FUNCTIONAL_ASSESSMENT: 0-10

## 2024-02-13 ASSESSMENT — PAIN DESCRIPTION - LOCATION
LOCATION: INCISION
LOCATION: ABDOMEN
LOCATION: ABDOMEN
LOCATION: ABDOMEN;GROIN
LOCATION: ABDOMEN

## 2024-02-13 ASSESSMENT — PAIN DESCRIPTION - ORIENTATION
ORIENTATION: MID

## 2024-02-13 NOTE — ANESTHESIA PROCEDURE NOTES
Arterial Line:    An arterial line was placed in the OR for the following indication(s): .    A 20 gauge (size) (length), Arrow (type) catheter was placed, Seldinger technique used, into the left radial artery, secured by .  Anesthesia type: General    Events:  patient tolerated procedure well with no complications.2/13/2024 10:03 AM2/13/2024 10:07 AM  Anesthesiologist: Yoandy Jimenez MD  Performed: Anesthesiologist   Preanesthetic Checklist  Completed: patient identified, IV checked, site marked, risks and benefits discussed, surgical/procedural consents, equipment checked, pre-op evaluation, timeout performed, anesthesia consent given, oxygen available, monitors applied/VS acknowledged, fire risk safety assessment completed and verbalized and blood product R/B/A discussed and consented          
Central Venous Line:    A central venous line was placed using surface landmarks, in the OR for the following indication(s): central venous access and CVP monitoring.2/13/2024 10:04 AM2/13/2024 10:14 AM    Sterility preparation included the following: hand hygiene performed prior to procedure, maximum sterile barriers used and sterile technique used to drape from head to toe.    The patient was placed in Trendelenburg position.The right internal jugular vein was prepped.triple lumen was placed.    During the procedure, the following specific steps were taken: target vein identified, needle advanced into vein and blood aspirated and guidewire advanced into vein.  Anesthesia type: general..No  Staffing  Performed: Anesthesiologist   Anesthesiologist: Yoandy Jimenez MD  Performed by: Yoandy Jimenez MD  Authorized by: Yoandy Jimenez MD    Preanesthetic Checklist  Completed: patient identified, IV checked, site marked, risks and benefits discussed, surgical/procedural consents, equipment checked, pre-op evaluation, timeout performed, anesthesia consent given, oxygen available, monitors applied/VS acknowledged, fire risk safety assessment completed and verbalized and blood product R/B/A discussed and consented          
Procedure Performed: KENYETTA       Start Time:  2/13/2024 4:30 PM       End Time:   2/13/2024 4:30 PM    Preanesthesia Checklist:  Patient identified, IV assessed, risks and benefits discussed, monitors and equipment assessed, procedure being performed at surgeon's request and anesthesia consent obtained.    General Procedure Information  Diagnostic Indications for Echo:  assessment of ascending aorta, hemodynamic monitoring and assessment of valve function  Physician Requesting Echo: Ras Ramires MD  CPT Code:  51139  mod 59  Location performed:  OR  Intubated  Bite block placed  Heart visualized  Probe Insertion:  Easy  Probe Type:  Mulitplane  Modalities:  2D, color flow mapping and continuous wave Doppler    Echocardiographic and Doppler Measurements    Ventricles    Other Ventricular Findings:       Mild biventricular dysfunction  No rwma        Valves    Aortic Valve:  Stenosis mild.  Leaflets calcified.      Mitral Valve:  Regurgitation none.      Other Valve Findings:       Small mobile echodensity seen on tip of aortic valve leaflet c/w degenerative pathology    Aorta    Other Aortic Findings:       Severe plaque disease of descending aorta and distal arch  No significant disease seen in visualized portions of ascending aorta                    Anesthesia Information  Performed Personally  Anesthesiologist:  Yoandy Jimenez MD          
05-Jan-2023 17:19

## 2024-02-13 NOTE — ANESTHESIA PRE PROCEDURE
Department of Anesthesiology  Preprocedure Note       Name:  Nato Beckham   Age:  64 y.o.  :  1960                                          MRN:  [id number]         Date:  2024      Surgeon: Surgeon(s):  Ras Ramires MD    Procedure: Procedure(s):  AORTO BIFEM BYPASS - CELLSAVER  BILATERAL FEMORAL ENDARTERECTOMY    Medications prior to admission:   Prior to Admission medications    Medication Sig Start Date End Date Taking? Authorizing Provider   aspirin 81 MG EC tablet Take 1 tablet by mouth daily   Yes Willis Chaparro MD   clopidogrel (PLAVIX) 75 MG tablet Take 1 tablet by mouth daily   Yes Willis Chaparro MD   atorvastatin (LIPITOR) 40 MG tablet Take 1 tablet by mouth daily   Yes Willis Chaparro MD   lisinopril (PRINIVIL;ZESTRIL) 5 MG tablet Take 1 tablet by mouth daily   Yes Willis Chaparro MD   albuterol sulfate HFA (VENTOLIN HFA) 108 (90 Base) MCG/ACT inhaler Inhale 2 puffs into the lungs every 4 hours as needed for Wheezing   Yes Willis Chaparro MD   ferrous sulfate (FE TABS 325) 325 (65 Fe) MG EC tablet Take 1 tablet by mouth 2 times daily (with meals)  Patient not taking: Reported on 1/15/2024 9/23/21   Latosha Lares MD   senna-docusate (STIMULANT LAXATIVE) 8.6-50 MG per tablet Take 1 tablet by mouth as needed for Constipation    Willis Chaparro MD   oxyCODONE-acetaminophen (PERCOCET)  MG per tablet Take 1 tablet by mouth every 6 hours as needed for Pain.    Willis Chaparro MD   ANORO ELLIPTA 62.5-25 MCG/INH AEPB inhaler Inhale 1 puff into the lungs daily 9/3/21   Willis Chaparro MD   Gabapentin, Once-Daily, (GRALISE) 600 MG TABS Take 1,800 mg by mouth nightly for 30 days.  Patient taking differently: Take 1,800 mg by mouth at bedtime. 21  Daniel Lua MD   cyclobenzaprine (FLEXERIL) 10 MG tablet Take 1 tablet by mouth 3 times daily as needed for Muscle spasms  Patient taking differently: Take 1 tablet by mouth 3  Pt admitted secondary to leg pain, unsteady gait. US shows" There are changes probably related to a partially ruptured left popliteal "

## 2024-02-13 NOTE — H&P
Interval H&P Note    Pt Name: Nato Beckham  MRN: [id number]  YOB: 1960  Date of evaluation: 2/13/2024      [x] I have reviewed in Epic the H&P by NATALIO Hastings dated 01/15/2024, attached below for an Interval History and Physical note.     [x] I have examined  Nato Beckham, a 64 y.o. male.There are no changes to the patient who is scheduled for AORTO BIFEM BYPASS - CELLSAVER, BILATERAL FEMORAL ENDARTERECTOMY by Ras Ramires MD for Iliac artery occlusion (HCC). The patient denies new health changes, fever, chills, wheezing, cough, increased SOB, chest pain, open sores or wounds. Denies hx of diabetes. Last Plavix 02/05/2024, aspirin 02/12/2024.     Vital signs: /88   Pulse 97   Temp 97.3 °F (36.3 °C)   SpO2 97%     Allergies:  Patient has no known allergies.    Medications:    Prior to Admission medications    Medication Sig Start Date End Date Taking? Authorizing Provider   aspirin 81 MG EC tablet Take 1 tablet by mouth daily   Yes ProviderWillis MD   clopidogrel (PLAVIX) 75 MG tablet Take 1 tablet by mouth daily   Yes ProviderWillis MD   atorvastatin (LIPITOR) 40 MG tablet Take 1 tablet by mouth daily   Yes ProviderWillis MD   lisinopril (PRINIVIL;ZESTRIL) 5 MG tablet Take 1 tablet by mouth daily   Yes ProviderWillis MD   albuterol sulfate HFA (VENTOLIN HFA) 108 (90 Base) MCG/ACT inhaler Inhale 2 puffs into the lungs every 4 hours as needed for Wheezing   Yes Provider, MD Willis   ferrous sulfate (FE TABS 325) 325 (65 Fe) MG EC tablet Take 1 tablet by mouth 2 times daily (with meals)  Patient not taking: Reported on 1/15/2024 9/23/21   Latosha Lares MD   senna-docusate (STIMULANT LAXATIVE) 8.6-50 MG per tablet Take 1 tablet by mouth as needed for Constipation    ProviderWillis MD   oxyCODONE-acetaminophen (PERCOCET)  MG per tablet Take 1 tablet by mouth every 6 hours as needed for Pain.    Provider, Historical, MD   ANORO  of Present Illness:     This is Nato Beckham a 64 y.o. male who presents for a pre-admission testing appointment for an upcoming AORTO BIFEM BYPASS - CELLSAVER, BILATERAL FEMORAL ENDARTERECTOMY by Ras Ramires MD scheduled on 2/13/2024 at 0930 due to iliac artery occlusion. Patient's chief complaint is bilateral leg pain which has become severe when walking. He states the pain is made worse when carrying objects. Patient states pain started after his neck surgery in 2021. He has to sit down after walking any distance. Pain is minimally relieved with Percocet and Gabapentin. Patient had outpatient CTA abdomen pelvis which showed severe arteriovascular disease. He was referred to Dr. Ramires and recommended surgical intervention. Patient had outpatient stress test and echocardiogram for cardiac clearance (in paper chart).     Functional Capacity per pt:  1) Pt is able to walk 2 city blocks on level ground without SOB. Patient has difficulty walking long distances due to leg pain.  2) Pt is unable to climb 2 flights of stairs due to leg pain.    Past Medical History:     Past Medical History:   Diagnosis Date    Arthralgia     Bilateral iliac artery occlusion (HCC)     Bruises easily     takes blood thinners    Cervical myelopathy (HCC)     COPD (chronic obstructive pulmonary disease) (HCC)     Dr Pratt for Pulmonology    DDD (degenerative disc disease), lumbar     Hyperlipidemia     Hypertension     Leg pain, bilateral     left leg is worse per patient    Lumbar disc herniation     Retrolisthesis     Smoker     Spondylolisthesis of lumbar region     Weight loss         Past Surgical History:     Past Surgical History:   Procedure Laterality Date    BACK SURGERY  06/01/2018    lumbar (2) disc removed and axel placed    CERVICAL FUSION N/A 05/24/2021    C 4-6 ACDF        HARDWARE REMOVAL        C6-7 PLATE - [employer]   NEURO MONITORING performed by aDniel Lua MD at [facility] OR    CHOLECYSTECTOMY      CT  anterior and posterior tibial arterial tree.    Musculoskeletal: No focal thoracolumbar vertebral body height loss. Broad-based disc-osteophytic complex with at least moderate stenosis at L5-S1.  Postoperative changes at L3-4 and L5-S1. Mild to moderate SI joint arthropathy. Mild to moderate pubic symphysitis.    3D images: Complete occlusion of the right superficial femoral artery. Hypoplasia of the distal arteries at the ankle and heel with limited contrast  within the anterior and posterior tibial arteries at level of the tibiotalar articulation. Complete occlusion of the left external iliac arterial  tree. Additionally, there is prominent lumen irregularity and plaque deposition within the distal aorta, deep arteries of the pelvis, proximal hips  and thighs to level of the knees bilaterally.    IMPRESSION:    Very prominent arteriovascular plaque deposition in areas of complete occlusion of the distal aorta, right common iliac arterial tree, left common  iliac arterial tree, right more than left superficial femoral arterial tree to the level of the popliteal arteries bilaterally. 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 There is very limited contrast within the feet, if any at all  distally.    Transcribed by: CHARLES BLANTON10/02/2023 10:06    Sincerely,    MAE MILLER MD    Electronically Signed: 10/02/2023 12:15     EKG: See paper chart.    Diagnosis:      1. Iliac artery occlusion    Plans:     1. AORTO BIFEM BYPASS - CELLSAVER, BILATERAL FEMORAL ENDARTERECTOMY      ADELAIDE De Luna - CNP  1/15/2024  2:53 PM

## 2024-02-13 NOTE — PROGRESS NOTES
RIGHT PEDAL AND POST TIBIAL PULSES CHECKED PREPROCEDURE WITH DOPPLER.  LEFT PEDAL AND POST TIBIAL PULSES DIFFICULT TO ASSESS, DR JO AWARE.

## 2024-02-13 NOTE — BRIEF OP NOTE
Brief Postoperative Note      Patient: Nato Beckham  YOB: 1960  MRN: [id number]    Date of Procedure: 2/13/2024    Pre-Op Diagnosis Codes:     * Aortic occlusion     * Iliac artery occlusion (HCC) [I74.5]     * Bilateral femoral artery occlusive disease    Post-Op Diagnosis: Same       Procedure(s):  AORTO BIFEMORAL BYPASS - CELLSAVER  BILATERAL FEMORAL ENDARTERECTOMY  Bilateral iliac endarterectomy    Surgeon(s):  Ras Ramires MD    Assistant:  Surgical Assistant: Mayelin Manning    Anesthesia: General    Estimated Blood Loss (mL): 2300    Fluids: 4500 mL crystalloid, 1000 mL albumin, 1500 mL Cell Saver    Complications: None    Specimens:   ID Type Source Tests Collected by Time Destination   A : AORTA, FEMORAL, AND ILIAC PLAQUE Tissue Aorta SURGICAL PATHOLOGY Ras Ramires MD 2/13/2024 1444        Implants:  Implant Name Type Inv. Item Serial No.  Lot No. LRB No. Used Action   GRAFT VASC L400MM [id number] BRANCH DIA8MM UNIV CLLGN DBL ALICIA - [id number] Vascular grafts GRAFT VASC L400MM [id number] BRANCH DIA8MM UNIV CLLGN DBL ALICIA  [employer]WD [id number] N/A 1 Implanted         Drains:   NG/OG/NJ/NE Tube Nasogastric 18 fr Left nostril (Active)       Urinary Catheter 02/13/24 [place] (Active)       Findings: Complete occlusion of the distal aorta and iliac arteries bilaterally with extensive plaque within the external iliac arteries and near occlusion of the common femoral arteries bilaterally.  Left superficial femoral artery is occluded at its midportion.      Electronically signed by Ras Ramires MD on 2/13/2024 at 4:38 PM

## 2024-02-14 ENCOUNTER — APPOINTMENT (OUTPATIENT)
Dept: GENERAL RADIOLOGY | Age: 64
DRG: 271 | End: 2024-02-14
Attending: SURGERY
Payer: COMMERCIAL

## 2024-02-14 LAB
ANION GAP SERPL CALCULATED.3IONS-SCNC: 11 MMOL/L (ref 9–17)
BUN SERPL-MCNC: 16 MG/DL (ref 8–23)
BUN/CREAT SERPL: 18 (ref 9–20)
CALCIUM SERPL-MCNC: 7.6 MG/DL (ref 8.6–10.4)
CHLORIDE SERPL-SCNC: 107 MMOL/L (ref 98–107)
CO2 SERPL-SCNC: 24 MMOL/L (ref 20–31)
CREAT SERPL-MCNC: 0.9 MG/DL (ref 0.7–1.2)
ERYTHROCYTE [DISTWIDTH] IN BLOOD BY AUTOMATED COUNT: 14 % (ref 11.8–14.4)
GFR SERPL CREATININE-BSD FRML MDRD: >60 ML/MIN/1.73M2
GLUCOSE SERPL-MCNC: 132 MG/DL (ref 70–99)
HCT VFR BLD AUTO: 28.2 % (ref 40.7–50.3)
HGB BLD-MCNC: 8.6 G/DL (ref 13–17)
MAGNESIUM SERPL-MCNC: 2.2 MG/DL (ref 1.6–2.6)
MCH RBC QN AUTO: 32.6 PG (ref 25.2–33.5)
MCHC RBC AUTO-ENTMCNC: 30.5 G/DL (ref 28–38)
MCV RBC AUTO: 106.8 FL (ref 82.6–102.9)
PHOSPHATE SERPL-MCNC: 4.9 MG/DL (ref 2.5–4.5)
PLATELET # BLD AUTO: 129 K/UL (ref 130–400)
POTASSIUM SERPL-SCNC: 4.7 MMOL/L (ref 3.7–5.3)
RBC # BLD AUTO: 2.64 M/UL (ref 4.21–5.77)
SODIUM SERPL-SCNC: 142 MMOL/L (ref 135–144)
WBC OTHER # BLD: 10.8 K/UL (ref 3.5–11.3)

## 2024-02-14 PROCEDURE — 2700000000 HC OXYGEN THERAPY PER DAY

## 2024-02-14 PROCEDURE — 2000000000 HC ICU R&B

## 2024-02-14 PROCEDURE — 85027 COMPLETE CBC AUTOMATED: CPT

## 2024-02-14 PROCEDURE — 6360000002 HC RX W HCPCS: Performed by: SURGERY

## 2024-02-14 PROCEDURE — 6370000000 HC RX 637 (ALT 250 FOR IP): Performed by: SURGERY

## 2024-02-14 PROCEDURE — 83735 ASSAY OF MAGNESIUM: CPT

## 2024-02-14 PROCEDURE — 6360000002 HC RX W HCPCS: Performed by: ANESTHESIOLOGY

## 2024-02-14 PROCEDURE — 71045 X-RAY EXAM CHEST 1 VIEW: CPT

## 2024-02-14 PROCEDURE — 6360000002 HC RX W HCPCS: Performed by: FAMILY MEDICINE

## 2024-02-14 PROCEDURE — 84100 ASSAY OF PHOSPHORUS: CPT

## 2024-02-14 PROCEDURE — 36415 COLL VENOUS BLD VENIPUNCTURE: CPT

## 2024-02-14 PROCEDURE — 80048 BASIC METABOLIC PNL TOTAL CA: CPT

## 2024-02-14 PROCEDURE — 2580000003 HC RX 258: Performed by: SURGERY

## 2024-02-14 PROCEDURE — 85055 RETICULATED PLATELET ASSAY: CPT

## 2024-02-14 PROCEDURE — 94761 N-INVAS EAR/PLS OXIMETRY MLT: CPT

## 2024-02-14 RX ORDER — CALCIUM GLUCONATE 10 MG/ML
1000 INJECTION, SOLUTION INTRAVENOUS PRN
Status: DISCONTINUED | OUTPATIENT
Start: 2024-02-14 | End: 2024-02-17 | Stop reason: HOSPADM

## 2024-02-14 RX ORDER — ASPIRIN 81 MG/1
81 TABLET, CHEWABLE ORAL DAILY
Status: DISCONTINUED | OUTPATIENT
Start: 2024-02-14 | End: 2024-02-17 | Stop reason: HOSPADM

## 2024-02-14 RX ORDER — CALCIUM GLUCONATE 20 MG/ML
2000 INJECTION, SOLUTION INTRAVENOUS PRN
Status: DISCONTINUED | OUTPATIENT
Start: 2024-02-14 | End: 2024-02-17 | Stop reason: HOSPADM

## 2024-02-14 RX ADMIN — Medication 2000 MG: at 19:53

## 2024-02-14 RX ADMIN — MORPHINE SULFATE 4 MG: 4 INJECTION, SOLUTION INTRAMUSCULAR; INTRAVENOUS at 15:16

## 2024-02-14 RX ADMIN — MORPHINE SULFATE 4 MG: 4 INJECTION, SOLUTION INTRAMUSCULAR; INTRAVENOUS at 09:52

## 2024-02-14 RX ADMIN — SODIUM CHLORIDE, POTASSIUM CHLORIDE, SODIUM LACTATE AND CALCIUM CHLORIDE: 600; 310; 30; 20 INJECTION, SOLUTION INTRAVENOUS at 12:40

## 2024-02-14 RX ADMIN — LORAZEPAM 0.5 MG: 2 INJECTION INTRAMUSCULAR; INTRAVENOUS at 16:30

## 2024-02-14 RX ADMIN — SODIUM CHLORIDE, POTASSIUM CHLORIDE, SODIUM LACTATE AND CALCIUM CHLORIDE: 600; 310; 30; 20 INJECTION, SOLUTION INTRAVENOUS at 19:13

## 2024-02-14 RX ADMIN — SODIUM CHLORIDE, PRESERVATIVE FREE 10 ML: 5 INJECTION INTRAVENOUS at 09:09

## 2024-02-14 RX ADMIN — HYDROMORPHONE HYDROCHLORIDE 0.25 MG: 1 INJECTION, SOLUTION INTRAMUSCULAR; INTRAVENOUS; SUBCUTANEOUS at 03:55

## 2024-02-14 RX ADMIN — SODIUM CHLORIDE, POTASSIUM CHLORIDE, SODIUM LACTATE AND CALCIUM CHLORIDE: 600; 310; 30; 20 INJECTION, SOLUTION INTRAVENOUS at 05:39

## 2024-02-14 RX ADMIN — MORPHINE SULFATE 4 MG: 4 INJECTION, SOLUTION INTRAMUSCULAR; INTRAVENOUS at 23:24

## 2024-02-14 RX ADMIN — ASPIRIN 81 MG CHEWABLE TABLET 81 MG: 81 TABLET CHEWABLE at 19:47

## 2024-02-14 RX ADMIN — SODIUM CHLORIDE, PRESERVATIVE FREE 10 ML: 5 INJECTION INTRAVENOUS at 19:47

## 2024-02-14 RX ADMIN — Medication 2000 MG: at 02:59

## 2024-02-14 RX ADMIN — LORAZEPAM 0.5 MG: 2 INJECTION INTRAMUSCULAR; INTRAVENOUS at 09:52

## 2024-02-14 RX ADMIN — Medication 2000 MG: at 11:25

## 2024-02-14 RX ADMIN — LORAZEPAM 0.5 MG: 2 INJECTION INTRAMUSCULAR; INTRAVENOUS at 20:59

## 2024-02-14 RX ADMIN — HYDROMORPHONE HYDROCHLORIDE 0.25 MG: 1 INJECTION, SOLUTION INTRAMUSCULAR; INTRAVENOUS; SUBCUTANEOUS at 01:57

## 2024-02-14 RX ADMIN — MORPHINE SULFATE 4 MG: 4 INJECTION, SOLUTION INTRAMUSCULAR; INTRAVENOUS at 05:24

## 2024-02-14 RX ADMIN — MORPHINE SULFATE 4 MG: 4 INJECTION, SOLUTION INTRAMUSCULAR; INTRAVENOUS at 12:23

## 2024-02-14 RX ADMIN — MORPHINE SULFATE 4 MG: 4 INJECTION, SOLUTION INTRAMUSCULAR; INTRAVENOUS at 19:47

## 2024-02-14 RX ADMIN — MORPHINE SULFATE 4 MG: 4 INJECTION, SOLUTION INTRAMUSCULAR; INTRAVENOUS at 07:58

## 2024-02-14 RX ADMIN — HYDROMORPHONE HYDROCHLORIDE 0.25 MG: 1 INJECTION, SOLUTION INTRAMUSCULAR; INTRAVENOUS; SUBCUTANEOUS at 00:43

## 2024-02-14 RX ADMIN — MORPHINE SULFATE 4 MG: 4 INJECTION, SOLUTION INTRAMUSCULAR; INTRAVENOUS at 17:42

## 2024-02-14 RX ADMIN — MORPHINE SULFATE 4 MG: 4 INJECTION, SOLUTION INTRAMUSCULAR; INTRAVENOUS at 03:08

## 2024-02-14 ASSESSMENT — PAIN DESCRIPTION - ORIENTATION
ORIENTATION: MID

## 2024-02-14 ASSESSMENT — PAIN DESCRIPTION - PAIN TYPE
TYPE: CHRONIC PAIN;SURGICAL PAIN
TYPE: SURGICAL PAIN
TYPE: CHRONIC PAIN;SURGICAL PAIN

## 2024-02-14 ASSESSMENT — PAIN DESCRIPTION - LOCATION
LOCATION: ABDOMEN
LOCATION: ABDOMEN;BACK
LOCATION: ABDOMEN
LOCATION: ABDOMEN;BACK
LOCATION: ABDOMEN
LOCATION: ABDOMEN;BACK
LOCATION: ABDOMEN

## 2024-02-14 ASSESSMENT — PAIN SCALES - GENERAL
PAINLEVEL_OUTOF10: 8
PAINLEVEL_OUTOF10: 10
PAINLEVEL_OUTOF10: 8
PAINLEVEL_OUTOF10: 8
PAINLEVEL_OUTOF10: 9
PAINLEVEL_OUTOF10: 8
PAINLEVEL_OUTOF10: 3
PAINLEVEL_OUTOF10: 8
PAINLEVEL_OUTOF10: 3
PAINLEVEL_OUTOF10: 4
PAINLEVEL_OUTOF10: 5
PAINLEVEL_OUTOF10: 8
PAINLEVEL_OUTOF10: 6
PAINLEVEL_OUTOF10: 3
PAINLEVEL_OUTOF10: 8
PAINLEVEL_OUTOF10: 9

## 2024-02-14 ASSESSMENT — PAIN DESCRIPTION - DESCRIPTORS
DESCRIPTORS: DISCOMFORT
DESCRIPTORS: DISCOMFORT
DESCRIPTORS: DISCOMFORT;SHARP
DESCRIPTORS: SHARP
DESCRIPTORS: DISCOMFORT

## 2024-02-14 ASSESSMENT — PAIN DESCRIPTION - FREQUENCY
FREQUENCY: INTERMITTENT

## 2024-02-14 NOTE — ANESTHESIA POSTPROCEDURE EVALUATION
Department of Anesthesiology  Postprocedure Note    Patient: Nato Beckham  MRN: [id number]  YOB: 1960  Date of evaluation: 02/13/24    Procedure Summary       Date: 02/13/24 Room / Location: [address]    Anesthesia Start: 0943 Anesthesia Stop: 1646    Procedures:       AORTO BIFEM BYPASS - CELLSAVER (Bilateral)      BILATERAL FEMORAL ENDARTERECTOMY (Bilateral) Diagnosis:       Iliac artery occlusion (HCC)      (Iliac artery occlusion (HCC) [I74.5])    Surgeons: Ras Ramires MD Responsible Provider: Yoandy Jimenez MD    Anesthesia Type: general ASA Status: 3            Anesthesia Type: No value filed.    Brent Phase I: Brent Score: 8    Brent Phase II:      Anesthesia Post Evaluation    Patient location during evaluation: PACU  Patient participation: complete - patient participated  Level of consciousness: awake and alert  Airway patency: patent  Nausea & Vomiting: no nausea and no vomiting  Cardiovascular status: hemodynamically stable  Respiratory status: acceptable  Hydration status: stable  Pain management: adequate        No notable events documented.

## 2024-02-14 NOTE — PROGRESS NOTES
Discontinued kasey and cvp monitoring at this time. Assisted pt up to the chair using 2 staff members and walker. Pt tolerated fairly well. Only steadying assistance needed. Pt did get tachy up to 130 (has been sitting in the low 100s), but recovered down to the 90s. Pain continues to be managed with 4mg IV morphine q2.

## 2024-02-14 NOTE — PROGRESS NOTES
Rounded with Dr. Ramires at bedside. No immediate concerns. Continue NG, ac, IV fluids, pain meds, and maintain surgical dressings and NPO status. VS remain stable. Ok to ambulate and remove a-line. New orders placed for routine labs and PRN lyte replacements.

## 2024-02-14 NOTE — OP NOTE
[address] [id number]                                OPERATIVE REPORT    PATIENT NAME: GEE LLANES                 :        1960  MED REC NO:   [id number]                             ROOM:         ACCOUNT NO:   [id number]                           ADMIT DATE: 2024  PROVIDER:     Ras Ramires MD    DATE OF PROCEDURE:  2024    PREOPERATIVE DIAGNOSES:  1.  Aortoiliac occlusion.  2.  Severe bilateral femoral occlusive disease.    POSTOPERATIVE DIAGNOSES:  1.  Aortoiliac occlusion.  2.  Severe bilateral femoral occlusive disease.    PROCEDURES:  1.  Aortobifemoral bypass with 14 x 8 mm Hemashield bifurcated graft.  2.  Aortic and bilateral common iliac artery endarterectomy.  3.  Bilateral femoral endarterectomy.    SURGEON:  Ras Ramires MD    ANESTHESIA:  General endotracheal.    ESTIMATED BLOOD LOSS:  2300 mL.    FLUIDS:  4500 mm crystalloid, 1000 mL albumin, 1500 mL Cell Saver.    COMPLICATIONS:  None.    OPERATIVE INDICATIONS:  The patient is a 64-year-old male who is a heavy  smoker who presents with bilateral rest pain.  CTA demonstrates  occlusion of the distal aorta as well as the iliac arteries bilaterally  with severe calcific plaque involving the external iliac arteries down  to the common femoral level where there is severe plaque at the common  femoral level and near occlusion bilaterally.  Distally on the left, the  superficial femoral artery is occluded at its midportion with  reconstitution via profunda collaterals.  At this time, he is being  taken to the operating room for elective aortobifemoral bypass with  aortoiliac and bilateral femoral endarterectomies.    OPERATIVE TECHNIQUE:  After informed consent had been obtained, the  patient was brought to operating room, and placed in a supine position  and general endotracheal anesthesia was induced.  The patient was  then  prepped and draped in the usual sterile fashion.  Oblique incisions were  made in both groins.  Subcutaneous tissue was sharply taken down.   Bleeding points being controlled with electrocautery.  The femoral  vessels were readily identified and noted to be densely calcified  throughout.  The common femoral, superficial femoral, and profunda  femoral arteries were circumferentially dissected, and vessels placed  around them.  Antibiotic soaked sponges were placed in both groins.  A  midline incision was then made and carried down below the fascia.  The  fascia was incised and the abdomen entered.  The incision was then  extended along its superior and inferior extents.  The abdomen was then  explored and the stomach, liver, small bowel, and colon appeared grossly  normal.  Nasogastric tube was positioned within the stomach and secured.  The duodenum was then mobilized from the retroperitoneum, and the bowels  were placed into a bowel bag and retracted laterally using a Bookwalter  retractor.  Retroperitoneum was incised and the aorta and iliac vessels  were dissected free.  Edges of the proximal aorta was then dissected and  the renal arteries were identified with left renal artery being the  lowest.  Retroureteral tunnels were then created between the abdomen and  both groins and red rubber catheters were placed through the tunnels.   At this point, the patient was intravenously heparinized with 5000 units  of aqueous heparin.  The 25 gm of mannitol was given intravenously.  The  aorta was then crossclamped in the infrarenal position with a Zanger aortic clamp and angled hypogastric clamps placed  at the iliac bifurcations.  The aorta was then incised longitudinally  and extended.  Very large lumbar and middle sacral vessels were  identified along with a patent inferior mesenteric artery.  The inferior  mesenteric artery was suture ligated with 3-0 Ethibond.  Majority of the  blood vessels due to these large  passed through the retroureteral tunnel and trimmed and  spatulated.  End-to-side anastomosis was created through the graft and  the right common femoral artery using running 5-0 Prolene suture.   Vessels were all back-bled and the graft flushed antegrade.  Closure was  completed and flow reestablished to the right lower extremity.  Thrombin  and Gelfoam were placed at the closure site.  Antibiotic Kaleb-Jose Carlos was  placed in the right groin.  Attention then turned to left side.  The  femoral vessels were clamped proximally and distally.  On the common  femoral artery was then opened longitudinally and extended onto the  proximal portion of the superficial femoral artery.  Again, there was  nearly occlusive plaque present within the femoral vessels at this  level.  Clamp was released from the superficial femoral artery and there  was no evidence of back bleeding.  The left common femoral and  bifurcation was also then endarterectomized in a standard fashion with  excellent distal taper point at the profunda femoral artery.  Large  amount of plaque was removed from the superficial femoral artery.   However, no significant flow remained from the superficial femoral  artery to the mid occlusion.  Vessels were irrigated with heparinized  saline and small debris fragments were removed.  The graft was then  trimmed to appropriate length and spatulated.  End-to-side anastomosis  was created using graft and the left common femoral artery using running  5-0 Prolene suture.  Prior to completing closure, the vessels were all  back-bled and the graft flushed antegrade.  Closure was completed and  flow reestablished to the left lower extremity.  Excellent Doppler  signal was present within the profunda femoral artery; however, limited  signals present within the left superficial femoral artery.  On the  right side, good Doppler signals were present in the superficial femoral  and profunda femoral arteries.  Thrombin Gelfoam was

## 2024-02-14 NOTE — CARE COORDINATION
Case Management Assessment  Initial Evaluation    Date/Time of Evaluation: 2/14/2024 4:11 PM  Assessment Completed by: Josie Torres RN    If patient is discharged prior to next notation, then this note serves as note for discharge by case management.    Patient Name: Nato Beckham                   YOB: 1960  Diagnosis: Iliac artery occlusion (HCC) [I74.5]  Aortoiliac occlusive disease (HCC) [I74.09]                   Date / Time: 2/13/2024  7:43 AM    Patient Admission Status: Inpatient   Readmission Risk (Low < 19, Mod (19-27), High > 27): Readmission Risk Score: 12.5    Current PCP: Ricardo Khalil MD  PCP verified by ? Yes    Chart Reviewed: Yes      History Provided by: Patient  Patient Orientation: Alert and Oriented, Person, Place, Situation, Self    Patient Cognition: Alert    Hospitalization in the last 30 days (Readmission):  No    If yes, Readmission Assessment in  Navigator will be completed.    Advance Directives:      Code Status: Full Code   Patient's Primary Decision Maker is: Legal Next of Kin      Discharge Planning:    Patient lives with: Spouse/Significant Other Type of Home: House  Primary Care Giver: Self  Patient Support Systems include: Spouse/Significant Other   Current Financial resources: None  Current community resources: None  Current services prior to admission: None            Current DME:              Type of Home Care services:  OT, PT, Skilled Therapy    ADLS  Prior functional level: Independent in ADLs/IADLs  Current functional level: Assistance with the following:, Bathing, Dressing, Toileting, Cooking, Housework, Feeding, Shopping, Mobility    PT AM-PAC:   /24  OT AM-PAC:   /24    Family can provide assistance at DC: Yes  Would you like Case Management to discuss the discharge plan with any other family members/significant others, and if so, who? Yes (spouse)  Plans to Return to Present Housing: Unknown at present  Other Identified

## 2024-02-14 NOTE — PROGRESS NOTES
VASCULAR SURGERY  PROGRESS NOTE      2/14/2024 10:07 AM  Subjective:   Admit Date: 2/13/2024  PCP: Ricardo Khalil MD    No chief complaint on file.    Interval History: No complaints.  Some bilious NG drainage.    Diet: Diet NPO    Medications:   Scheduled Meds:   sodium chloride flush  5-40 mL IntraVENous 2 times per day    ceFAZolin  2,000 mg IntraVENous Q8H     Continuous Infusions:   sodium chloride      sodium chloride      lactated ringers IV soln 150 mL/hr at 02/14/24 0645    sodium chloride           Labs:   CBC:   Recent Labs     02/13/24  1903 02/14/24  0413   WBC 14.1* 10.8   HGB 10.6* 8.6*   * 129*     BMP:    Recent Labs     02/13/24  1523 02/14/24  0413   NA  --  142   K  --  4.7   CL  --  107   CO2  --  24   BUN  --  16   CREATININE 0.7 0.9   GLUCOSE  --  132*     Hepatic: No results for input(s): \"AST\", \"ALT\", \"ALB\", \"BILITOT\", \"ALKPHOS\" in the last 72 hours.  Troponin: Invalid input(s): \"TROPONIN\"  BNP: No results for input(s): \"BNP\" in the last 72 hours.  Lipids: No results for input(s): \"CHOL\", \"HDL\" in the last 72 hours.    Invalid input(s): \"LDLCALCU\"  INR: No results for input(s): \"INR\" in the last 72 hours.    Objective:   Vitals: /70   Pulse (!) 107   Temp 98.2 °F (36.8 °C) (Temporal)   Resp 21   Ht 1.727 m (5' 7.99\")   Wt 55.5 kg (122 lb 5.7 oz)   SpO2 97%   BMI 18.61 kg/m²   General appearance: alert, cooperative and no distress  Mental Status: oriented to person, place and time with normal affect  Neck: good carotid pulses, no JVD  Lungs: clear to auscultation bilaterally, normal effort  Heart: regular rate and rhythm, no murmur,  Abdomen: soft, incisional tenderness, nondistended, dressing clean and dry  Extremities: no edema, erythema or tenderness in the calves, pulses by Doppler  Skin: no gross lesions, rashes, or induration    Assessment:   64-year-old male doing well status post aortobifemoral bypass with aortoiliac and bilateral femoral

## 2024-02-14 NOTE — CONSULTS
Consult  [facility].,    Adult Hospitalist      Name: Nato Beckham  MRN: [id number]     Acct: [id number]  Room: 2035/2035-01    Admit Date: 2/13/2024  7:43 AM  PCP: Ricardo Khalil MD    Primary Problem  Principal Problem:    Aortoiliac occlusive disease (HCC)  Resolved Problems:    * No resolved hospital problems. *        Assesment:     Aortoiliac occlusion  Bilateral severe femoral occlusive disease  Status post aortobifemoral bypass 2/13/2024  Status post aortic and bilateral common Ilac artery endarterectomy  Status post bilateral femoral endarterectomy  Hypertensive renal disease  Mixed hyperlipidemia  Chronic obstructive pulmonary disease, unspecified  Degenerative disc disease, lumbar  Iron deficiency anemia  Anxiety disorder  Insomnia        Plan:     Admitted to CVTU  Monitor vitals closely  Keep SpO2 above 90%  I's and O's  IV fluids  Pain control  Antiemetics as needed  NPO at this time  NG  Switch essential home medication to IV  Add parameters  Ativan IV as needed with caution for anxiety  CBC, BMP  Discussed with RN  DVT and GI prophylaxis.        Chief Complaint:     No chief complaint on file.    Medical management    History of Present Illness:      Nato Beckham is a 64 y.o.  male who presents with No chief complaint on file.    Patient underwent aortobifemoral bypass aortic and bilateral common iliac artery endarterectomy bilateral femoral endarterectomy without incident.  Patient has complained of some pain in given Dilaudid IV recently.      Patient denies any chest pain, dyspnea or orthopnea.  Denies vomiting, headache, vision change.  Denies leg pain.  Patient complains of low back pain and has been placed with a slight angle to his left.    I have personally reviewed the past medical history, past surgical history, medications, social history, and family history, and summarized in the note.    Review of Systems:     All 10 point system is reviewed and negative  MD Willis   ferrous sulfate (FE TABS 325) 325 (65 Fe) MG EC tablet Take 1 tablet by mouth 2 times daily (with meals)  Patient not taking: Reported on 1/15/2024 9/23/21   Latosha Lares MD   senna-docusate (STIMULANT LAXATIVE) 8.6-50 MG per tablet Take 1 tablet by mouth as needed for Constipation    ProviderWillis MD   oxyCODONE-acetaminophen (PERCOCET)  MG per tablet Take 1 tablet by mouth every 6 hours as needed for Pain.    Willis Chaparro MD   ANORO ELLIPTA 62.5-25 MCG/INH AEPB inhaler Inhale 1 puff into the lungs daily 9/3/21   Willis Chaparro MD   Gabapentin, Once-Daily, (GRALISE) 600 MG TABS Take 1,800 mg by mouth nightly for 30 days.  Patient taking differently: Take 1,800 mg by mouth at bedtime. 21  Daniel Lua MD   cyclobenzaprine (FLEXERIL) 10 MG tablet Take 1 tablet by mouth 3 times daily as needed for Muscle spasms  Patient taking differently: Take 1 tablet by mouth 3 times daily as needed for Muscle spasms Prescription is for TID prn , but Pt reports he only takes once daily. 21   Daniel Lua MD   traZODone (DESYREL) 100 MG tablet Take 2 tablets by mouth nightly    ProviderWillis MD        Allergies:       Patient has no known allergies.    Social History:     Tobacco:    reports that he has been smoking cigarettes. He has a 70.0 pack-year smoking history. He has never used smokeless tobacco.  Alcohol:      reports current alcohol use.  Drug Use:  reports no history of drug use.    Family History:     Family History   Problem Relation Age of Onset    Asthma Mother     Other Father     Cancer Brother          Physical Exam:     Vitals:  /76   Pulse (!) 107   Temp 98.1 °F (36.7 °C)   Resp 15   Ht 1.727 m (5' 7.99\")   Wt 54.9 kg (121 lb 0.5 oz)   SpO2 99%   BMI 18.41 kg/m²   Temp (24hrs), Av.8 °F (36.6 °C), Min:97.3 °F (36.3 °C), Max:98.1 °F (36.7 °C)          General appearance - alert, well appearing, and in mild acute

## 2024-02-15 LAB
ANION GAP SERPL CALCULATED.3IONS-SCNC: 10 MMOL/L (ref 9–17)
BLOOD BANK DISPENSE STATUS: NORMAL
BPU ID: NORMAL
BUN SERPL-MCNC: 13 MG/DL (ref 8–23)
BUN/CREAT SERPL: 22 (ref 9–20)
CA-I BLD-SCNC: 1.14 MMOL/L (ref 1.15–1.33)
CALCIUM SERPL-MCNC: 7.6 MG/DL (ref 8.6–10.4)
CHLORIDE SERPL-SCNC: 105 MMOL/L (ref 98–107)
CO2 SERPL-SCNC: 27 MMOL/L (ref 20–31)
COMPONENT: NORMAL
CREAT SERPL-MCNC: 0.6 MG/DL (ref 0.7–1.2)
ERYTHROCYTE [DISTWIDTH] IN BLOOD BY AUTOMATED COUNT: 14 % (ref 11.8–14.4)
GFR SERPL CREATININE-BSD FRML MDRD: >60 ML/MIN/1.73M2
GLUCOSE SERPL-MCNC: 105 MG/DL (ref 70–99)
HCT VFR BLD AUTO: 17.9 % (ref 40.7–50.3)
HCT VFR BLD AUTO: 19.8 % (ref 40.7–50.3)
HCT VFR BLD AUTO: 23.5 % (ref 40.7–50.3)
HGB BLD-MCNC: 6 G/DL (ref 13–17)
HGB BLD-MCNC: 6.1 G/DL (ref 13–17)
HGB BLD-MCNC: 7.8 G/DL (ref 13–17)
MAGNESIUM SERPL-MCNC: 2.1 MG/DL (ref 1.6–2.6)
MCH RBC QN AUTO: 32.1 PG (ref 25.2–33.5)
MCHC RBC AUTO-ENTMCNC: 30.8 G/DL (ref 28.4–34.8)
MCV RBC AUTO: 104.2 FL (ref 82.6–102.9)
NRBC BLD-RTO: 0 PER 100 WBC
PHOSPHATE SERPL-MCNC: 2.3 MG/DL (ref 2.5–4.5)
PLATELET # BLD AUTO: 97 K/UL (ref 138–453)
PMV BLD AUTO: 10.2 FL (ref 8.1–13.5)
POTASSIUM SERPL-SCNC: 4 MMOL/L (ref 3.7–5.3)
RBC # BLD AUTO: 1.9 M/UL (ref 4.21–5.77)
SODIUM SERPL-SCNC: 142 MMOL/L (ref 135–144)
SURGICAL PATHOLOGY REPORT: NORMAL
TRANSFUSION STATUS: NORMAL
UNIT DIVISION: 0
WBC OTHER # BLD: 11 K/UL (ref 3.5–11.3)

## 2024-02-15 PROCEDURE — 97116 GAIT TRAINING THERAPY: CPT

## 2024-02-15 PROCEDURE — 6370000000 HC RX 637 (ALT 250 FOR IP): Performed by: SURGERY

## 2024-02-15 PROCEDURE — 2580000003 HC RX 258: Performed by: SURGERY

## 2024-02-15 PROCEDURE — 83735 ASSAY OF MAGNESIUM: CPT

## 2024-02-15 PROCEDURE — 94761 N-INVAS EAR/PLS OXIMETRY MLT: CPT

## 2024-02-15 PROCEDURE — 85018 HEMOGLOBIN: CPT

## 2024-02-15 PROCEDURE — 36430 TRANSFUSION BLD/BLD COMPNT: CPT

## 2024-02-15 PROCEDURE — 6360000002 HC RX W HCPCS: Performed by: SURGERY

## 2024-02-15 PROCEDURE — 36415 COLL VENOUS BLD VENIPUNCTURE: CPT

## 2024-02-15 PROCEDURE — 84100 ASSAY OF PHOSPHORUS: CPT

## 2024-02-15 PROCEDURE — 97163 PT EVAL HIGH COMPLEX 45 MIN: CPT

## 2024-02-15 PROCEDURE — 97530 THERAPEUTIC ACTIVITIES: CPT

## 2024-02-15 PROCEDURE — P9016 RBC LEUKOCYTES REDUCED: HCPCS

## 2024-02-15 PROCEDURE — 97166 OT EVAL MOD COMPLEX 45 MIN: CPT

## 2024-02-15 PROCEDURE — 97535 SELF CARE MNGMENT TRAINING: CPT

## 2024-02-15 PROCEDURE — 2000000000 HC ICU R&B

## 2024-02-15 PROCEDURE — 2700000000 HC OXYGEN THERAPY PER DAY

## 2024-02-15 PROCEDURE — 82330 ASSAY OF CALCIUM: CPT

## 2024-02-15 PROCEDURE — 6370000000 HC RX 637 (ALT 250 FOR IP): Performed by: FAMILY MEDICINE

## 2024-02-15 PROCEDURE — 94640 AIRWAY INHALATION TREATMENT: CPT

## 2024-02-15 PROCEDURE — 6360000002 HC RX W HCPCS: Performed by: FAMILY MEDICINE

## 2024-02-15 PROCEDURE — 85027 COMPLETE CBC AUTOMATED: CPT

## 2024-02-15 PROCEDURE — 85014 HEMATOCRIT: CPT

## 2024-02-15 PROCEDURE — 80048 BASIC METABOLIC PNL TOTAL CA: CPT

## 2024-02-15 RX ORDER — CYCLOBENZAPRINE HCL 10 MG
10 TABLET ORAL 3 TIMES DAILY PRN
Status: DISCONTINUED | OUTPATIENT
Start: 2024-02-15 | End: 2024-02-17 | Stop reason: HOSPADM

## 2024-02-15 RX ORDER — ATORVASTATIN CALCIUM 40 MG/1
40 TABLET, FILM COATED ORAL NIGHTLY
Status: DISCONTINUED | OUTPATIENT
Start: 2024-02-15 | End: 2024-02-17 | Stop reason: HOSPADM

## 2024-02-15 RX ORDER — GABAPENTIN 300 MG/1
1800 CAPSULE ORAL NIGHTLY
Status: DISCONTINUED | OUTPATIENT
Start: 2024-02-15 | End: 2024-02-17 | Stop reason: HOSPADM

## 2024-02-15 RX ORDER — ALBUTEROL SULFATE 90 UG/1
2 AEROSOL, METERED RESPIRATORY (INHALATION) EVERY 6 HOURS PRN
Status: DISCONTINUED | OUTPATIENT
Start: 2024-02-15 | End: 2024-02-17 | Stop reason: HOSPADM

## 2024-02-15 RX ORDER — OXYCODONE AND ACETAMINOPHEN 10; 325 MG/1; MG/1
1 TABLET ORAL EVERY 6 HOURS PRN
Status: DISCONTINUED | OUTPATIENT
Start: 2024-02-15 | End: 2024-02-17

## 2024-02-15 RX ORDER — IPRATROPIUM BROMIDE AND ALBUTEROL SULFATE 2.5; .5 MG/3ML; MG/3ML
1 SOLUTION RESPIRATORY (INHALATION) EVERY 4 HOURS PRN
Status: DISCONTINUED | OUTPATIENT
Start: 2024-02-15 | End: 2024-02-15

## 2024-02-15 RX ORDER — SODIUM CHLORIDE 9 MG/ML
INJECTION, SOLUTION INTRAVENOUS PRN
Status: DISCONTINUED | OUTPATIENT
Start: 2024-02-15 | End: 2024-02-17 | Stop reason: HOSPADM

## 2024-02-15 RX ORDER — SENNA AND DOCUSATE SODIUM 50; 8.6 MG/1; MG/1
1 TABLET, FILM COATED ORAL PRN
Status: DISCONTINUED | OUTPATIENT
Start: 2024-02-15 | End: 2024-02-17 | Stop reason: HOSPADM

## 2024-02-15 RX ORDER — ALBUTEROL SULFATE 90 UG/1
2 AEROSOL, METERED RESPIRATORY (INHALATION) EVERY 4 HOURS PRN
Status: DISCONTINUED | OUTPATIENT
Start: 2024-02-15 | End: 2024-02-15 | Stop reason: SDUPTHER

## 2024-02-15 RX ADMIN — SODIUM CHLORIDE, POTASSIUM CHLORIDE, SODIUM LACTATE AND CALCIUM CHLORIDE: 600; 310; 30; 20 INJECTION, SOLUTION INTRAVENOUS at 08:13

## 2024-02-15 RX ADMIN — MORPHINE SULFATE 4 MG: 4 INJECTION, SOLUTION INTRAMUSCULAR; INTRAVENOUS at 05:01

## 2024-02-15 RX ADMIN — ALBUTEROL SULFATE 2 PUFF: 90 AEROSOL, METERED RESPIRATORY (INHALATION) at 22:24

## 2024-02-15 RX ADMIN — ASPIRIN 81 MG CHEWABLE TABLET 81 MG: 81 TABLET CHEWABLE at 08:24

## 2024-02-15 RX ADMIN — MORPHINE SULFATE 4 MG: 4 INJECTION, SOLUTION INTRAMUSCULAR; INTRAVENOUS at 22:48

## 2024-02-15 RX ADMIN — SODIUM CHLORIDE, PRESERVATIVE FREE 10 ML: 5 INJECTION INTRAVENOUS at 20:50

## 2024-02-15 RX ADMIN — Medication 2000 MG: at 11:28

## 2024-02-15 RX ADMIN — LORAZEPAM 0.5 MG: 2 INJECTION INTRAMUSCULAR; INTRAVENOUS at 08:14

## 2024-02-15 RX ADMIN — OXYCODONE AND ACETAMINOPHEN 1 TABLET: 325; 10 TABLET ORAL at 16:49

## 2024-02-15 RX ADMIN — MORPHINE SULFATE 4 MG: 4 INJECTION, SOLUTION INTRAMUSCULAR; INTRAVENOUS at 02:10

## 2024-02-15 RX ADMIN — Medication 2000 MG: at 02:09

## 2024-02-15 RX ADMIN — SODIUM CHLORIDE, PRESERVATIVE FREE 10 ML: 5 INJECTION INTRAVENOUS at 08:24

## 2024-02-15 RX ADMIN — ATORVASTATIN CALCIUM 40 MG: 40 TABLET, FILM COATED ORAL at 20:49

## 2024-02-15 RX ADMIN — MORPHINE SULFATE 4 MG: 4 INJECTION, SOLUTION INTRAMUSCULAR; INTRAVENOUS at 12:14

## 2024-02-15 RX ADMIN — MORPHINE SULFATE 4 MG: 4 INJECTION, SOLUTION INTRAMUSCULAR; INTRAVENOUS at 07:39

## 2024-02-15 RX ADMIN — ALBUTEROL SULFATE 2 PUFF: 90 AEROSOL, METERED RESPIRATORY (INHALATION) at 11:48

## 2024-02-15 RX ADMIN — SODIUM CHLORIDE, POTASSIUM CHLORIDE, SODIUM LACTATE AND CALCIUM CHLORIDE: 600; 310; 30; 20 INJECTION, SOLUTION INTRAVENOUS at 02:08

## 2024-02-15 RX ADMIN — GABAPENTIN 1800 MG: 300 CAPSULE ORAL at 20:49

## 2024-02-15 RX ADMIN — SODIUM CHLORIDE, POTASSIUM CHLORIDE, SODIUM LACTATE AND CALCIUM CHLORIDE: 600; 310; 30; 20 INJECTION, SOLUTION INTRAVENOUS at 13:57

## 2024-02-15 ASSESSMENT — PAIN DESCRIPTION - LOCATION
LOCATION: ABDOMEN;GROIN
LOCATION: ABDOMEN;BACK
LOCATION: ABDOMEN

## 2024-02-15 ASSESSMENT — PAIN SCALES - GENERAL
PAINLEVEL_OUTOF10: 3
PAINLEVEL_OUTOF10: 7
PAINLEVEL_OUTOF10: 5
PAINLEVEL_OUTOF10: 8
PAINLEVEL_OUTOF10: 3
PAINLEVEL_OUTOF10: 8
PAINLEVEL_OUTOF10: 5
PAINLEVEL_OUTOF10: 8
PAINLEVEL_OUTOF10: 3
PAINLEVEL_OUTOF10: 3
PAINLEVEL_OUTOF10: 5
PAINLEVEL_OUTOF10: 10
PAINLEVEL_OUTOF10: 7

## 2024-02-15 ASSESSMENT — PAIN DESCRIPTION - FREQUENCY
FREQUENCY: INTERMITTENT
FREQUENCY: INTERMITTENT

## 2024-02-15 ASSESSMENT — PAIN DESCRIPTION - DESCRIPTORS
DESCRIPTORS: DISCOMFORT
DESCRIPTORS: DISCOMFORT
DESCRIPTORS: ACHING
DESCRIPTORS: DISCOMFORT

## 2024-02-15 ASSESSMENT — PAIN DESCRIPTION - PAIN TYPE
TYPE: CHRONIC PAIN;SURGICAL PAIN
TYPE: SURGICAL PAIN
TYPE: ACUTE PAIN
TYPE: CHRONIC PAIN;SURGICAL PAIN

## 2024-02-15 ASSESSMENT — PAIN - FUNCTIONAL ASSESSMENT
PAIN_FUNCTIONAL_ASSESSMENT: PREVENTS OR INTERFERES SOME ACTIVE ACTIVITIES AND ADLS
PAIN_FUNCTIONAL_ASSESSMENT: ACTIVITIES ARE NOT PREVENTED
PAIN_FUNCTIONAL_ASSESSMENT: PREVENTS OR INTERFERES SOME ACTIVE ACTIVITIES AND ADLS

## 2024-02-15 ASSESSMENT — PAIN DESCRIPTION - ORIENTATION
ORIENTATION: MID
ORIENTATION: RIGHT;LEFT;MID
ORIENTATION: MID

## 2024-02-15 ASSESSMENT — PAIN DESCRIPTION - ONSET: ONSET: GRADUAL

## 2024-02-15 NOTE — PROGRESS NOTES
Consult  [facility].,    Adult Hospitalist      Name: Nato Beckham  MRN: [id number]     Acct: [id number]  Room: 2035/2035-01    Admit Date: 2/13/2024  7:43 AM  PCP: Ricardo Khalil MD    Primary Problem  Principal Problem:    Aortoiliac occlusive disease (HCC)  Resolved Problems:    * No resolved hospital problems. *        Assesment:     Aortoiliac occlusion  Bilateral severe femoral occlusive disease  Status post aortobifemoral bypass 2/13/2024  Status post aortic and bilateral common Ilac artery endarterectomy  Status post bilateral femoral endarterectomy  Hypertensive renal disease  Mixed hyperlipidemia  Chronic obstructive pulmonary disease, unspecified  Degenerative disc disease, lumbar  Iron deficiency anemia  Anxiety disorder  Insomnia  Thrombocytopenia        Plan:     Admitted to CVTU  Monitor vitals closely  Keep SpO2 above 90%  I's and O's  IV fluids  Pain control  Antiemetics as needed  NPO at this time  NG  Switch essential home medication to IV  Add parameters  Ativan IV as needed with caution for anxiety  CBC, BMP  Discussed with RN  DVT and GI prophylaxis.      Thank you for consulting us!      Chief Complaint:     No chief complaint on file.    Medical management    History of Present Illness:        Patient seen and examined at bedside  Last 24-hour events reviewed with nursing  Patient says he feels better now  Pain better controlled  Able to move his limbs better    Denies chest pain, dyspnea orthopnea  Denies nausea, vomiting   Complains of abdominal pain and received pain medication now  Denies orthopnea, headache, vision change  Denies paresthesias      Initial HPI  Nato Beckham is a 64 y.o.  male who presents with No chief complaint on file.    Patient underwent aortobifemoral bypass aortic and bilateral common iliac artery endarterectomy bilateral femoral endarterectomy without incident.  Patient has complained of some pain in given Dilaudid IV recently.     reports that he has been smoking cigarettes. He has a 70.0 pack-year smoking history. He has never used smokeless tobacco.  Alcohol:      reports current alcohol use.  Drug Use:  reports no history of drug use.    Family History:     Family History   Problem Relation Age of Onset    Asthma Mother     Other Father     Cancer Brother          Physical Exam:     Vitals:  /67   Pulse (!) 103   Temp 97.8 °F (36.6 °C) (Temporal)   Resp 18   Ht 1.727 m (5' 7.99\")   Wt 55.5 kg (122 lb 5.7 oz)   SpO2 100%   BMI 18.61 kg/m²   Temp (24hrs), Av.6 °F (36.4 °C), Min:96.9 °F (36.1 °C), Max:98.2 °F (36.8 °C)          General appearance - alert, well appearing, and in mild acute distress.  NG.  O2 via nasal cannula  Mental status - oriented to person, place, and time with normal affect  Head - normocephalic and atraumatic  Eyes - pupils equal and reactive, extraocular eye movements intact, conjunctiva clear  Ears - hearing appears to be intact  Nose - no drainage noted  Mouth - mucous membranes moist  Neck - supple, no carotid bruits, thyroid not palpable  Chest - clear to auscultation, normal effort  Heart - normal rate, regular rhythm, no murmur  Abdomen - soft, nontender, nondistended, bowel sounds present all four quadrants, no masses, hepatomegaly or splenomegaly  Neurological - normal speech, no focal findings or movement disorder noted, cranial nerves II through XII grossly intact  Extremities - peripheral pulses palpable, no pedal edema or calf pain with palpation  Skin - no gross lesions, rashes, or induration noted        Data:     Labs:    Hematology:  Recent Labs     24  1903 24  0413   WBC 14.1* 10.8   RBC 3.24* 2.64*   HGB 10.6* 8.6*   HCT 33.9* 28.2*   .6* 106.8*   MCH 32.7 32.6   MCHC 31.3 30.5   RDW 14.0 14.0   * 129*   MPV 9.3  --        Chemistry:  Recent Labs     24  1523 24  0413   NA  --  142   K  --  4.7   CL  --  107   CO2  --  24   GLUCOSE  --  132*

## 2024-02-15 NOTE — PROGRESS NOTES
I attended the patient during the first 15 minutes of the blood transfusion and did not recognize a potential blood reaction.

## 2024-02-15 NOTE — PROGRESS NOTES
Occupational Therapy  Facility/Department: [facility]  Occupational Therapy Initial Assessment    Name: Nato Beckham  : 1960  MRN: [id number]  Date of Service: 2/15/2024    Discharge Recommendations:  Patient would benefit from continued therapy after discharge  OT Equipment Recommendations  Equipment Needed:  (CTA)       Patient Diagnosis(es): The primary encounter diagnosis was Aortic atherosclerosis (HCC). A diagnosis of Iliac artery occlusion (HCC) was also pertinent to this visit.  Past Medical History:  has a past medical history of Arthralgia, Bilateral iliac artery occlusion (HCC), Bruises easily, Cervical myelopathy (HCC), COPD (chronic obstructive pulmonary disease) (HCC), DDD (degenerative disc disease), lumbar, Hyperlipidemia, Hypertension, Leg pain, bilateral, Lumbar disc herniation, Retrolisthesis, Smoker, Spondylolisthesis of lumbar region, and Weight loss.  Past Surgical History:  has a past surgical history that includes Cholecystectomy; back surgery (2018); Neck surgery; cervical fusion (N/A, 2021); CT NEEDLE BIOPSY LUNG PERCUTANEOUS (2021); CT GUIDED CHEST TUBE (2021); CT GUIDED CHEST TUBE (2021); Aorto-femoral Bypass Graft (Bilateral, 2024); and vascular surgery (Bilateral, 2024).             Per H&P:   64 y.o. male.There are no changes to the patient who is scheduled for AORTO BIFEM BYPASS - CELLSAVER, BILATERAL FEMORAL ENDARTERECTOMY by Ras Ramires MD for Iliac artery occlusion (HCC). The patient denies new health changes, fever, chills, wheezing, cough, increased SOB, chest pain, open sores or wounds. Denies hx of diabetes. Last Plavix 2024, aspirin 2024.       Date of Procedure: 2024  Pre-Op Diagnosis Codes:     * Aortic occlusion     * Iliac artery occlusion (HCC) [I74.5]     * Bilateral femoral artery occlusive disease  Post-Op Diagnosis: Same  Procedure(s):  AORTO BIFEMORAL BYPASS - CELLSAVER  BILATERAL FEMORAL

## 2024-02-15 NOTE — PROGRESS NOTES
Physical Therapy  Facility/Department: [facility]  Physical Therapy Initial Assessment    Name: Nato Beckham  : 1960  MRN: [id number]  Date of Service: 2/15/2024      Per H&P:   64 y.o. male.There are no changes to the patient who is scheduled for AORTO BIFEM BYPASS - CELLSAVER, BILATERAL FEMORAL ENDARTERECTOMY by Ras Ramires MD for Iliac artery occlusion (HCC). The patient denies new health changes, fever, chills, wheezing, cough, increased SOB, chest pain, open sores or wounds. Denies hx of diabetes. Last Plavix 2024, aspirin 2024.      Date of Procedure: 2024  Pre-Op Diagnosis Codes:     * Aortic occlusion     * Iliac artery occlusion (HCC) [I74.5]     * Bilateral femoral artery occlusive disease  Post-Op Diagnosis: Same  Procedure(s):  AORTO BIFEMORAL BYPASS - CELLSAVER  BILATERAL FEMORAL ENDARTERECTOMY  Bilateral iliac endarterectomy  Surgeon(s):  Ras Ramires MD  Assistant:  Surgical Assistant: Mayelin Manning  Anesthesia: General  Estimated Blood Loss (mL): 2300  Fluids: 4500 mL crystalloid, 1000 mL albumin, 1500 mL Cell Saver  Complications: None      RN Leidy/Andreina reports patient is medically stable for therapy treatment this date.    Chart reviewed prior to treatment and patient is agreeable for therapy.  All lines intact and patient positioned comfortably at end of treatment.  All patient needs addressed prior to ending therapy session.     Discharge Recommendations:  Patient would benefit from continued therapy after discharge   Pt currently functioning below baseline.  Recommend daily inpatient skilled therapy at time of discharge to maximize long term outcomes and prevent re-admission. Please refer to AM-PAC score for current level of function.   PT Equipment Recommendations  Equipment Needed: No      Patient Diagnosis(es): The primary encounter diagnosis was Aortic atherosclerosis (HCC). A diagnosis of Iliac artery occlusion (HCC) was also pertinent to this  Strategies  Verbal edu provided regarding fall prevention in the areas of community safety, transportation, proper footwear and clothing, reducing risk of falls, environmental modifications, importance of exercise, consequences of falling, plan if a fall occurs (\"rest and wait\" vs \"up and about\").   Pt is at risk for skin breakdown.  Pt educated on pressure relief measures.  Pt reports being comfortable at end of treatment.        Education Method: Demonstration;Verbal  Barriers to Learning: Cognition  Education Outcome: Continued education needed      Therapy Time   Individual Concurrent Group Co-treatment   Time In 1020         Time Out 1104         Minutes 44 + 10 = 54 minutes          Additional 10 minutes for chart review.  Treatment Time: 38 minutes      Co-treatment with OT warranted secondary to decreased safety and independence requiring 2 skilled therapy professionals to address individual discipline's goals. PT addressing core control in transitions with bed mobility & transfers, seated/standing posture, pressure relief, seated & standing postural alignment and breathing techniques, safety/scanning, & fall prevention in ambulation techniques.        Selin Arzola, PT

## 2024-02-15 NOTE — PROGRESS NOTES
End Of Shift Note  [facility]  Summary of shift: Patient up to chair for 2.5hrs today, did talk about trouble swallowing which existed before hospital stay. Explained that swallow study would be likely if a bedside swallow failed. Some home PO meds restarted. Patient experienced some disorientation after a dose of ativan which was not observed the day prior. Nurse decision to hold ativan in the future.     Vitals:    Vitals:    02/15/24 1500 02/15/24 1600 02/15/24 1700 02/15/24 1800   BP: (!) 107/56 (!) 113/58 122/70 110/72   Pulse: 88 82 91 83   Resp: 16 21 25 20   Temp:  97.5 °F (36.4 °C)     TempSrc:  Temporal     SpO2:   94% 96%   Weight:       Height:            I&O:   Intake/Output Summary (Last 24 hours) at 2/15/2024 1841  Last data filed at 2/15/2024 1600  Gross per 24 hour   Intake 4610.55 ml   Output 1615 ml   Net 2995.55 ml       Resp Status: 2L NC        LDA:   Peripheral IV 02/15/24 Right Antecubital (Active)   Number of days: 0       Peripheral IV 02/15/24 Right Forearm (Active)   Number of days: 0       Urinary Catheter 02/13/24 [place] (Active)   Number of days: 2       Puncture 09/14/21 Back Right (Active)   Number of days: 884       Incision 02/13/24 Femoral Proximal;Right (Active)   Number of days: 2       Incision 02/13/24 Femoral Left;Proximal (Active)   Number of days: 2       Incision 02/13/24 Abdomen (Active)   Number of days: 2

## 2024-02-15 NOTE — PLAN OF CARE
Problem: Discharge Planning  Goal: Discharge to home or other facility with appropriate resources  Outcome: Progressing  Flowsheets (Taken 2/14/2024 1906)  Discharge to home or other facility with appropriate resources:   Identify barriers to discharge with patient and caregiver   Arrange for needed discharge resources and transportation as appropriate   Identify discharge learning needs (meds, wound care, etc)     Problem: Pain  Goal: Verbalizes/displays adequate comfort level or baseline comfort level  Outcome: Progressing  Flowsheets (Taken 2/13/2024 2000 by Mark Whitehead RN)  Verbalizes/displays adequate comfort level or baseline comfort level:   Encourage patient to monitor pain and request assistance   Assess pain using appropriate pain scale   Administer analgesics based on type and severity of pain and evaluate response   Implement non-pharmacological measures as appropriate and evaluate response   Consider cultural and social influences on pain and pain management   Notify Licensed Independent Practitioner if interventions unsuccessful or patient reports new pain     Problem: ABCDS Injury Assessment  Goal: Absence of physical injury  Outcome: Progressing  Flowsheets (Taken 2/14/2024 1906)  Absence of Physical Injury: Implement safety measures based on patient assessment     Problem: Safety - Adult  Goal: Free from fall injury  Outcome: Progressing  Flowsheets (Taken 2/14/2024 1906)  Free From Fall Injury: Based on caregiver fall risk screen, instruct family/caregiver to ask for assistance with transferring infant if caregiver noted to have fall risk factors     Problem: Skin/Tissue Integrity  Goal: Absence of new skin breakdown  Description: 1.  Monitor for areas of redness and/or skin breakdown  2.  Assess vascular access sites hourly  3.  Every 4-6 hours minimum:  Change oxygen saturation probe site  4.  Every 4-6 hours:  If on nasal continuous positive airway pressure, respiratory therapy assess nares

## 2024-02-15 NOTE — PLAN OF CARE
Problem: Discharge Planning  Goal: Discharge to home or other facility with appropriate resources  2/14/2024 1906 by Brittny Cha, RN  Outcome: Progressing  Flowsheets (Taken 2/14/2024 1906)  Discharge to home or other facility with appropriate resources:   Identify barriers to discharge with patient and caregiver   Arrange for needed discharge resources and transportation as appropriate   Identify discharge learning needs (meds, wound care, etc)  2/14/2024 1906 by Brittny Cha, RN  Outcome: Progressing  Flowsheets (Taken 2/14/2024 1906)  Discharge to home or other facility with appropriate resources:   Identify barriers to discharge with patient and caregiver   Arrange for needed discharge resources and transportation as appropriate   Identify discharge learning needs (meds, wound care, etc)     Problem: Pain  Goal: Verbalizes/displays adequate comfort level or baseline comfort level  Outcome: Progressing  Flowsheets (Taken 2/13/2024 2000 by Mark Whitehead, RN)  Verbalizes/displays adequate comfort level or baseline comfort level:   Encourage patient to monitor pain and request assistance   Assess pain using appropriate pain scale   Administer analgesics based on type and severity of pain and evaluate response   Implement non-pharmacological measures as appropriate and evaluate response   Consider cultural and social influences on pain and pain management   Notify Licensed Independent Practitioner if interventions unsuccessful or patient reports new pain     Problem: ABCDS Injury Assessment  Goal: Absence of physical injury  Outcome: Progressing  Flowsheets (Taken 2/14/2024 1906)  Absence of Physical Injury: Implement safety measures based on patient assessment     Problem: Safety - Adult  Goal: Free from fall injury  Outcome: Progressing  Flowsheets (Taken 2/14/2024 1906)  Free From Fall Injury: Based on caregiver fall risk screen, instruct family/caregiver to ask for assistance with transferring infant

## 2024-02-15 NOTE — PROGRESS NOTES
Pt critical hgb 6.0    0536 DAYSI Mccarty called to the unit to verify blood consent with patient over phone. See NP note for more details.

## 2024-02-15 NOTE — PROGRESS NOTES
End Of Shift Note  [facility]  Summary of shift: Patient had eventful night. He removed NG tube despite constant redirection to not. 4 attempts made to replace, but patient screamed in pain yelling that we were \"killing\" him, patient unable to swallow tube and threw up multiple times during placement attempts. Patient also caught pulling on central line and was educated on dangers of self removal. Patient restless overnight only sleeping for about 2 if added together. Patient screaming \"help me\" many times through out the night, attempting to get out of bed or pull off lines. Patient redirectable at these times and oriented x4. Hgb this AM resulted low 6.1 and recheck was completed. Result 6.0. Consent for transfusion was completed and blood hung at change of shift and verified with day shift. Vitals stable overnight. Urine output via ac 1L.     Vitals:    Vitals:    02/15/24 0655 02/15/24 0658 02/15/24 0700 02/15/24 0716   BP: 132/75 125/74 125/74 126/78   Pulse: (!) 103 98 93 98   Resp: 21 25 23 24   Temp: 97.5 °F (36.4 °C) 97.5 °F (36.4 °C)  97.5 °F (36.4 °C)   TempSrc: Temporal Temporal  Temporal   SpO2: 100% 100%  98%   Weight:       Height:            I&O:   Intake/Output Summary (Last 24 hours) at 2/15/2024 0728  Last data filed at 2/14/2024 1500  Gross per 24 hour   Intake --   Output 465 ml   Net -465 ml       Resp Status: 2L NC    Ventilator Settings:     / / /     Critical Care IV infusions:   sodium chloride      sodium chloride      sodium chloride      lactated ringers IV soln 150 mL/hr at 02/15/24 0208    sodium chloride          LDA:   CVC Triple Lumen 02/13/24 (Active)   Number of days: 1       Urinary Catheter 02/13/24 Ac (Active)   Number of days: 1       Puncture 09/14/21 Back Right (Active)   Number of days: 883       Incision 02/13/24 Femoral Proximal;Right (Active)   Number of days: 1       Incision 02/13/24 Femoral Left;Proximal (Active)   Number of days: 1       Incision 02/13/24  Abdomen (Active)   Number of days: 1

## 2024-02-15 NOTE — PROGRESS NOTES
VASCULAR SURGERY  PROGRESS NOTE      2/15/2024 9:11 AM  Subjective:   Admit Date: 2/13/2024  PCP: Ricardo Khalil MD    No chief complaint on file.    Interval History: No complaints.  Patient pulled NG tube last night.  Denies nausea.  Mild peripheral edema.  Good distal pulses.    Diet: Diet NPO Exceptions are: Sips of Water with Meds, Ice Chips    Medications:   Scheduled Meds:   aspirin  81 mg Oral Daily    sodium chloride flush  5-40 mL IntraVENous 2 times per day    ceFAZolin  2,000 mg IntraVENous Q8H     Continuous Infusions:   sodium chloride      sodium chloride      sodium chloride      lactated ringers IV soln 100 mL/hr at 02/15/24 0905    sodium chloride           Labs:   CBC:   Recent Labs     02/13/24  1903 02/14/24  0413 02/15/24  0335 02/15/24  0505   WBC 14.1* 10.8 11.0  --    HGB 10.6* 8.6* 6.1* 6.0*   * 129* 97*  --        BMP:    Recent Labs     02/13/24  1523 02/14/24  0413 02/15/24  0335   NA  --  142 142   K  --  4.7 4.0   CL  --  107 105   CO2  --  24 27   BUN  --  16 13   CREATININE 0.7 0.9 0.6*   GLUCOSE  --  132* 105*       Hepatic: No results for input(s): \"AST\", \"ALT\", \"ALB\", \"BILITOT\", \"ALKPHOS\" in the last 72 hours.  Troponin: Invalid input(s): \"TROPONIN\"  BNP: No results for input(s): \"BNP\" in the last 72 hours.  Lipids: No results for input(s): \"CHOL\", \"HDL\" in the last 72 hours.    Invalid input(s): \"LDLCALCU\"  INR: No results for input(s): \"INR\" in the last 72 hours.    Objective:   Vitals: /77   Pulse (!) 115   Temp 97.3 °F (36.3 °C) (Temporal)   Resp 22   Ht 1.727 m (5' 7.99\")   Wt 55.5 kg (122 lb 5.7 oz)   SpO2 (!) 88%   BMI 18.61 kg/m²   General appearance: alert, cooperative and no distress  Mental Status: oriented to person, place and time with normal affect  Neck: good carotid pulses, no JVD  Lungs: clear to auscultation bilaterally, normal effort  Heart: regular rate and rhythm, no murmur,  Abdomen: soft, incisional tenderness,  nondistended, incisions clean and dry  Extremities: no edema, erythema or tenderness in the calves, palpable distal pulses  Skin: no gross lesions, rashes, or induration    Assessment:   64-year-old male doing well status post aortobifemoral bypass with aortoiliac and bilateral femoral endarterectomies, postoperative day #2    Patient Active Problem List:     Cervical spondylosis with myelopathy     Postprocedural pneumothorax     Pneumothorax     Aortoiliac occlusive disease (HCC)      Plan:   Sips of clears  Out of bed to chair/ambulate  EPC cuffs  Incentive spirometer/deep breathing    Electronically signed by Ras Ramires MD on 2/15/2024 at 9:11 AM

## 2024-02-15 NOTE — RT PROTOCOL NOTE
RT Inhaler-Nebulizer Bronchodilator Protocol Note    There is a bronchodilator order in the chart from a provider indicating to follow the RT Bronchodilator Protocol and there is an “Initiate RT Inhaler-Nebulizer Bronchodilator Protocol” order as well (see protocol at bottom of note).    CXR Findings:  XR CHEST PORTABLE    Result Date: 2/14/2024  1. No acute cardiopulmonary process. 2. Stable support tubes and lines.     XR CHEST PORTABLE    Result Date: 2/13/2024  No acute cardiopulmonary process.       The findings from the last RT Protocol Assessment were as follows:   History Pulmonary Disease: Chronic pulmonary disease  Respiratory Pattern: Regular pattern and RR 12-20 bpm  Breath Sounds: Clear breath sounds  Cough: Strong, spontaneous, non-productive  Indication for Bronchodilator Therapy:    Bronchodilator Assessment Score: 2    Aerosolized bronchodilator medication orders have been revised according to the RT Inhaler-Nebulizer Bronchodilator Protocol below.    Respiratory Therapist to perform RT Therapy Protocol Assessment initially then follow the protocol.  Repeat RT Therapy Protocol Assessment PRN for score 0-3 or on second treatment, BID, and PRN for scores above 3.    No Indications - adjust the frequency to every 6 hours PRN wheezing or bronchospasm, if no treatments needed after 48 hours then discontinue using Per Protocol order mode.     If indication present, adjust the RT bronchodilator orders based on the Bronchodilator Assessment Score as indicated below.  Use Inhaler orders unless patient has one or more of the following: on home nebulizer, not able to hold breath for 10 seconds, is not alert and oriented, cannot activate and use MDI correctly, or respiratory rate 25 breaths per minute or more, then use the equivalent nebulizer order(s) with same Frequency and PRN reasons based on the score.  If a patient is on this medication at home then do not decrease Frequency below that used at

## 2024-02-15 NOTE — CONSENT
Informed Consent for Blood Component Transfusion Note    I have discussed with the patient the rationale for blood component transfusion; its benefits in treating or preventing fatigue, organ damage, or death; and its risk which includes mild transfusion reactions, rare risk of blood borne infection, or more serious but rare reactions. I have discussed the alternatives to transfusion, including the risk and consequences of not receiving transfusion. The patient had an opportunity to ask questions and had agreed to proceed with transfusion of blood components.    Electronically signed by ADELAIDE Posey CNP on 2/15/24 at 5:37 AM EST

## 2024-02-15 NOTE — PROGRESS NOTES
0625 called to lab to confirm current patient blood band ok for transfusion, transferred to blood bank who confirmed band is okay and a new type and screen is not needed.

## 2024-02-15 NOTE — PLAN OF CARE
Problem: Discharge Planning  Goal: Discharge to home or other facility with appropriate resources  2/14/2024 2305 by Seble Grayson RN  Outcome: Progressing  2/14/2024 1906 by Brittny Cha RN  Outcome: Progressing  Flowsheets (Taken 2/14/2024 1906)  Discharge to home or other facility with appropriate resources:   Identify barriers to discharge with patient and caregiver   Arrange for needed discharge resources and transportation as appropriate   Identify discharge learning needs (meds, wound care, etc)  2/14/2024 1906 by Brittny Cha RN  Outcome: Progressing  Flowsheets (Taken 2/14/2024 1906)  Discharge to home or other facility with appropriate resources:   Identify barriers to discharge with patient and caregiver   Arrange for needed discharge resources and transportation as appropriate   Identify discharge learning needs (meds, wound care, etc)     Problem: Pain  Goal: Verbalizes/displays adequate comfort level or baseline comfort level  2/14/2024 2305 by Seble Grayson RN  Outcome: Progressing  2/14/2024 1906 by Brittny Cha RN  Outcome: Progressing  Flowsheets (Taken 2/13/2024 2000 by Mark Whitehead, RN)  Verbalizes/displays adequate comfort level or baseline comfort level:   Encourage patient to monitor pain and request assistance   Assess pain using appropriate pain scale   Administer analgesics based on type and severity of pain and evaluate response   Implement non-pharmacological measures as appropriate and evaluate response   Consider cultural and social influences on pain and pain management   Notify Licensed Independent Practitioner if interventions unsuccessful or patient reports new pain     Problem: ABCDS Injury Assessment  Goal: Absence of physical injury  2/14/2024 2305 by Seble Grayson RN  Outcome: Progressing  2/14/2024 1906 by Brittny Cha RN  Outcome: Progressing  Flowsheets (Taken 2/14/2024 1906)  Absence of Physical Injury: Implement safety measures based  on patient assessment     Problem: Safety - Adult  Goal: Free from fall injury  2/14/2024 2305 by Seble Grayson, RN  Outcome: Progressing  2/14/2024 1906 by Brittny Cha, RN  Outcome: Progressing  Flowsheets (Taken 2/14/2024 1906)  Free From Fall Injury: Based on caregiver fall risk screen, instruct family/caregiver to ask for assistance with transferring infant if caregiver noted to have fall risk factors     Problem: Skin/Tissue Integrity  Goal: Absence of new skin breakdown  Description: 1.  Monitor for areas of redness and/or skin breakdown  2.  Assess vascular access sites hourly  3.  Every 4-6 hours minimum:  Change oxygen saturation probe site  4.  Every 4-6 hours:  If on nasal continuous positive airway pressure, respiratory therapy assess nares and determine need for appliance change or resting period.  Outcome: Progressing

## 2024-02-15 NOTE — PROGRESS NOTES
Physician Progress Note      PATIENT:               GEE LLANES  CSN #:                  [id number]  :                       1960  ADMIT DATE:       2024 7:43 AM  DISCH DATE:  RESPONDING  PROVIDER #:        Ras Ramires MD          QUERY TEXT:    Pt admitted with aortoiliac occlusion and has anemia documented. If possible,   please document in progress notes and discharge summary further specificity   regarding the acuity and type of anemia:    The medical record reflects the following:  Risk Factors: Post OR  Clinical Indicators: Pre-op Hgb of 13.1, Post-op Hgb of 8.6, EBL of 2300 cc in   OR  Treatment: IVF, Labs    Thank you,  Nehal CHAVEZ RN  Options provided:  -- Anemia due to acute blood loss  -- Anemia due to postoperative blood loss  -- Other - I will add my own diagnosis  -- Disagree - Not applicable / Not valid  -- Disagree - Clinically unable to determine / Unknown  -- Refer to Clinical Documentation Reviewer    PROVIDER RESPONSE TEXT:    This patient has blood loss anemia secondary to postoperative lysis of the Cell Saver product.    Query created by: Daily Duong on 2/15/2024 8:50 AM      Electronically signed by:  Ras Ramires MD 2/15/2024 9:04 AM

## 2024-02-16 LAB
ANION GAP SERPL CALCULATED.3IONS-SCNC: 12 MMOL/L (ref 9–17)
BUN SERPL-MCNC: 8 MG/DL (ref 8–23)
BUN/CREAT SERPL: 20 (ref 9–20)
CA-I BLD-SCNC: 1.09 MMOL/L (ref 1.15–1.33)
CA-I BLD-SCNC: 1.13 MMOL/L (ref 1.13–1.33)
CALCIUM SERPL-MCNC: 7.9 MG/DL (ref 8.6–10.4)
CHLORIDE SERPL-SCNC: 100 MMOL/L (ref 98–107)
CO2 SERPL-SCNC: 26 MMOL/L (ref 20–31)
CREAT SERPL-MCNC: 0.4 MG/DL (ref 0.7–1.2)
ERYTHROCYTE [DISTWIDTH] IN BLOOD BY AUTOMATED COUNT: 15.8 % (ref 11.8–14.4)
GFR SERPL CREATININE-BSD FRML MDRD: >60 ML/MIN/1.73M2
GLUCOSE SERPL-MCNC: 69 MG/DL (ref 70–99)
HCT VFR BLD AUTO: 22.4 % (ref 40.7–50.3)
HGB BLD-MCNC: 7.3 G/DL (ref 13–17)
MAGNESIUM SERPL-MCNC: 2 MG/DL (ref 1.6–2.6)
MCH RBC QN AUTO: 32 PG (ref 25.2–33.5)
MCHC RBC AUTO-ENTMCNC: 32.6 G/DL (ref 28.4–34.8)
MCV RBC AUTO: 98.2 FL (ref 82.6–102.9)
NRBC BLD-RTO: 0 PER 100 WBC
PHOSPHATE SERPL-MCNC: 2.5 MG/DL (ref 2.5–4.5)
PLATELET # BLD AUTO: 94 K/UL (ref 138–453)
PMV BLD AUTO: 9.9 FL (ref 8.1–13.5)
POTASSIUM SERPL-SCNC: 3.3 MMOL/L (ref 3.7–5.3)
POTASSIUM SERPL-SCNC: 3.7 MMOL/L (ref 3.7–5.3)
RBC # BLD AUTO: 2.28 M/UL (ref 4.21–5.77)
SODIUM SERPL-SCNC: 138 MMOL/L (ref 135–144)
WBC OTHER # BLD: 10.9 K/UL (ref 3.5–11.3)

## 2024-02-16 PROCEDURE — 94761 N-INVAS EAR/PLS OXIMETRY MLT: CPT

## 2024-02-16 PROCEDURE — 83735 ASSAY OF MAGNESIUM: CPT

## 2024-02-16 PROCEDURE — 94640 AIRWAY INHALATION TREATMENT: CPT

## 2024-02-16 PROCEDURE — 97535 SELF CARE MNGMENT TRAINING: CPT

## 2024-02-16 PROCEDURE — 97110 THERAPEUTIC EXERCISES: CPT

## 2024-02-16 PROCEDURE — 97530 THERAPEUTIC ACTIVITIES: CPT

## 2024-02-16 PROCEDURE — 6370000000 HC RX 637 (ALT 250 FOR IP): Performed by: FAMILY MEDICINE

## 2024-02-16 PROCEDURE — 80048 BASIC METABOLIC PNL TOTAL CA: CPT

## 2024-02-16 PROCEDURE — 6370000000 HC RX 637 (ALT 250 FOR IP): Performed by: NURSE PRACTITIONER

## 2024-02-16 PROCEDURE — 2060000000 HC ICU INTERMEDIATE R&B

## 2024-02-16 PROCEDURE — 82330 ASSAY OF CALCIUM: CPT

## 2024-02-16 PROCEDURE — 6360000002 HC RX W HCPCS: Performed by: SURGERY

## 2024-02-16 PROCEDURE — 2580000003 HC RX 258: Performed by: SURGERY

## 2024-02-16 PROCEDURE — P9047 ALBUMIN (HUMAN), 25%, 50ML: HCPCS | Performed by: SURGERY

## 2024-02-16 PROCEDURE — 6370000000 HC RX 637 (ALT 250 FOR IP): Performed by: SURGERY

## 2024-02-16 PROCEDURE — 36415 COLL VENOUS BLD VENIPUNCTURE: CPT

## 2024-02-16 PROCEDURE — 97116 GAIT TRAINING THERAPY: CPT

## 2024-02-16 PROCEDURE — 84100 ASSAY OF PHOSPHORUS: CPT

## 2024-02-16 PROCEDURE — 85027 COMPLETE CBC AUTOMATED: CPT

## 2024-02-16 PROCEDURE — 84132 ASSAY OF SERUM POTASSIUM: CPT

## 2024-02-16 PROCEDURE — 2700000000 HC OXYGEN THERAPY PER DAY

## 2024-02-16 RX ORDER — POTASSIUM CHLORIDE 20 MEQ/1
40 TABLET, EXTENDED RELEASE ORAL ONCE
Status: DISCONTINUED | OUTPATIENT
Start: 2024-02-16 | End: 2024-02-16

## 2024-02-16 RX ORDER — POTASSIUM CHLORIDE 20 MEQ/1
20 TABLET, EXTENDED RELEASE ORAL ONCE
Status: COMPLETED | OUTPATIENT
Start: 2024-02-16 | End: 2024-02-16

## 2024-02-16 RX ORDER — POTASSIUM CHLORIDE 7.45 MG/ML
10 INJECTION INTRAVENOUS
Status: DISPENSED | OUTPATIENT
Start: 2024-02-16 | End: 2024-02-16

## 2024-02-16 RX ORDER — ONDANSETRON 4 MG/1
4 TABLET, FILM COATED ORAL EVERY 6 HOURS PRN
Status: DISCONTINUED | OUTPATIENT
Start: 2024-02-16 | End: 2024-02-17 | Stop reason: HOSPADM

## 2024-02-16 RX ORDER — ALBUTEROL SULFATE 90 UG/1
2 AEROSOL, METERED RESPIRATORY (INHALATION)
Status: DISCONTINUED | OUTPATIENT
Start: 2024-02-16 | End: 2024-02-17 | Stop reason: HOSPADM

## 2024-02-16 RX ORDER — ALBUMIN (HUMAN) 12.5 G/50ML
25 SOLUTION INTRAVENOUS ONCE
Status: COMPLETED | OUTPATIENT
Start: 2024-02-16 | End: 2024-02-16

## 2024-02-16 RX ORDER — FUROSEMIDE 10 MG/ML
20 INJECTION INTRAMUSCULAR; INTRAVENOUS ONCE
Status: COMPLETED | OUTPATIENT
Start: 2024-02-16 | End: 2024-02-16

## 2024-02-16 RX ADMIN — CALCIUM GLUCONATE 1000 MG: 10 INJECTION, SOLUTION INTRAVENOUS at 07:19

## 2024-02-16 RX ADMIN — POTASSIUM CHLORIDE 10 MEQ: 7.46 INJECTION, SOLUTION INTRAVENOUS at 18:32

## 2024-02-16 RX ADMIN — SODIUM CHLORIDE, PRESERVATIVE FREE 10 ML: 5 INJECTION INTRAVENOUS at 22:05

## 2024-02-16 RX ADMIN — POTASSIUM CHLORIDE 20 MEQ: 1500 TABLET, EXTENDED RELEASE ORAL at 22:06

## 2024-02-16 RX ADMIN — OXYCODONE AND ACETAMINOPHEN 1 TABLET: 325; 10 TABLET ORAL at 04:22

## 2024-02-16 RX ADMIN — ONDANSETRON HYDROCHLORIDE 4 MG: 4 TABLET, FILM COATED ORAL at 16:41

## 2024-02-16 RX ADMIN — SODIUM CHLORIDE, POTASSIUM CHLORIDE, SODIUM LACTATE AND CALCIUM CHLORIDE: 600; 310; 30; 20 INJECTION, SOLUTION INTRAVENOUS at 11:38

## 2024-02-16 RX ADMIN — SODIUM CHLORIDE, POTASSIUM CHLORIDE, SODIUM LACTATE AND CALCIUM CHLORIDE: 600; 310; 30; 20 INJECTION, SOLUTION INTRAVENOUS at 00:19

## 2024-02-16 RX ADMIN — ALBUMIN (HUMAN) 25 G: 0.25 INJECTION, SOLUTION INTRAVENOUS at 09:48

## 2024-02-16 RX ADMIN — ALBUTEROL SULFATE 2 PUFF: 90 AEROSOL, METERED RESPIRATORY (INHALATION) at 19:40

## 2024-02-16 RX ADMIN — SODIUM CHLORIDE, PRESERVATIVE FREE 10 ML: 5 INJECTION INTRAVENOUS at 08:33

## 2024-02-16 RX ADMIN — FUROSEMIDE 20 MG: 10 INJECTION, SOLUTION INTRAMUSCULAR; INTRAVENOUS at 10:07

## 2024-02-16 RX ADMIN — MORPHINE SULFATE 4 MG: 4 INJECTION, SOLUTION INTRAMUSCULAR; INTRAVENOUS at 08:38

## 2024-02-16 RX ADMIN — OXYCODONE AND ACETAMINOPHEN 1 TABLET: 325; 10 TABLET ORAL at 11:04

## 2024-02-16 RX ADMIN — ATORVASTATIN CALCIUM 40 MG: 40 TABLET, FILM COATED ORAL at 22:05

## 2024-02-16 RX ADMIN — ALBUTEROL SULFATE 2 PUFF: 90 AEROSOL, METERED RESPIRATORY (INHALATION) at 13:15

## 2024-02-16 RX ADMIN — MORPHINE SULFATE 2 MG: 2 INJECTION, SOLUTION INTRAMUSCULAR; INTRAVENOUS at 22:06

## 2024-02-16 RX ADMIN — ASPIRIN 81 MG CHEWABLE TABLET 81 MG: 81 TABLET CHEWABLE at 08:34

## 2024-02-16 RX ADMIN — OXYCODONE AND ACETAMINOPHEN 1 TABLET: 325; 10 TABLET ORAL at 18:29

## 2024-02-16 RX ADMIN — GABAPENTIN 1800 MG: 300 CAPSULE ORAL at 22:06

## 2024-02-16 RX ADMIN — POTASSIUM CHLORIDE 10 MEQ: 7.46 INJECTION, SOLUTION INTRAVENOUS at 19:50

## 2024-02-16 RX ADMIN — MORPHINE SULFATE 2 MG: 2 INJECTION, SOLUTION INTRAMUSCULAR; INTRAVENOUS at 14:37

## 2024-02-16 ASSESSMENT — PAIN DESCRIPTION - FREQUENCY
FREQUENCY: CONTINUOUS
FREQUENCY: INTERMITTENT
FREQUENCY: CONTINUOUS
FREQUENCY: INTERMITTENT
FREQUENCY: INTERMITTENT

## 2024-02-16 ASSESSMENT — PAIN DESCRIPTION - ONSET
ONSET: ON-GOING
ONSET: GRADUAL
ONSET: PROGRESSIVE

## 2024-02-16 ASSESSMENT — PAIN DESCRIPTION - DESCRIPTORS
DESCRIPTORS: SHARP
DESCRIPTORS: ACHING
DESCRIPTORS: SHARP
DESCRIPTORS: SHARP;ACHING
DESCRIPTORS: ACHING;SHARP
DESCRIPTORS: ACHING;SHARP
DESCRIPTORS: SHARP
DESCRIPTORS: SHARP
DESCRIPTORS: SHARP;ACHING
DESCRIPTORS: ACHING
DESCRIPTORS: ACHING

## 2024-02-16 ASSESSMENT — PAIN SCALES - WONG BAKER
WONGBAKER_NUMERICALRESPONSE: 0

## 2024-02-16 ASSESSMENT — PAIN DESCRIPTION - PAIN TYPE
TYPE: SURGICAL PAIN

## 2024-02-16 ASSESSMENT — PAIN DESCRIPTION - LOCATION
LOCATION: ABDOMEN

## 2024-02-16 ASSESSMENT — PAIN SCALES - GENERAL
PAINLEVEL_OUTOF10: 8
PAINLEVEL_OUTOF10: 5
PAINLEVEL_OUTOF10: 6
PAINLEVEL_OUTOF10: 6
PAINLEVEL_OUTOF10: 8
PAINLEVEL_OUTOF10: 3
PAINLEVEL_OUTOF10: 5
PAINLEVEL_OUTOF10: 8
PAINLEVEL_OUTOF10: 4
PAINLEVEL_OUTOF10: 5
PAINLEVEL_OUTOF10: 5
PAINLEVEL_OUTOF10: 7
PAINLEVEL_OUTOF10: 5

## 2024-02-16 ASSESSMENT — PAIN DESCRIPTION - ORIENTATION
ORIENTATION: MID

## 2024-02-16 NOTE — PLAN OF CARE
Problem: Discharge Planning  Goal: Discharge to home or other facility with appropriate resources  2/16/2024 0033 by aVlentina Damon RN  Outcome: Progressing  Flowsheets (Taken 2/15/2024 2000)  Discharge to home or other facility with appropriate resources:   Identify barriers to discharge with patient and caregiver   Refer to discharge planning if patient needs post-hospital services based on physician order or complex needs related to functional status, cognitive ability or social support system  2/15/2024 1322 by Brittny Cha RN  Outcome: Progressing  Flowsheets (Taken 2/14/2024 1906)  Discharge to home or other facility with appropriate resources:   Identify barriers to discharge with patient and caregiver   Arrange for needed discharge resources and transportation as appropriate   Identify discharge learning needs (meds, wound care, etc)     Problem: Pain  Goal: Verbalizes/displays adequate comfort level or baseline comfort level  2/16/2024 0033 by Valentina Damon RN  Outcome: Progressing  2/15/2024 1322 by Brittny Cha RN  Outcome: Progressing  Flowsheets (Taken 2/13/2024 2000 by Mark Whitehead RN)  Verbalizes/displays adequate comfort level or baseline comfort level:   Encourage patient to monitor pain and request assistance   Assess pain using appropriate pain scale   Administer analgesics based on type and severity of pain and evaluate response   Implement non-pharmacological measures as appropriate and evaluate response   Consider cultural and social influences on pain and pain management   Notify Licensed Independent Practitioner if interventions unsuccessful or patient reports new pain     Problem: ABCDS Injury Assessment  Goal: Absence of physical injury  2/16/2024 0033 by Valentina Damon RN  Outcome: Progressing  Flowsheets (Taken 2/15/2024 2000)  Absence of Physical Injury: Implement safety measures based on patient assessment  2/15/2024 1322 by Brittny Cha  RN  Outcome: Progressing  Flowsheets (Taken 2/14/2024 1906)  Absence of Physical Injury: Implement safety measures based on patient assessment     Problem: Safety - Adult  Goal: Free from fall injury  2/16/2024 0033 by Valentina Damon RN  Outcome: Progressing  Flowsheets (Taken 2/15/2024 2000)  Free From Fall Injury: Based on caregiver fall risk screen, instruct family/caregiver to ask for assistance with transferring infant if caregiver noted to have fall risk factors  2/15/2024 1322 by Brittny Cha RN  Outcome: Progressing  Flowsheets (Taken 2/14/2024 1906)  Free From Fall Injury: Based on caregiver fall risk screen, instruct family/caregiver to ask for assistance with transferring infant if caregiver noted to have fall risk factors     Problem: Skin/Tissue Integrity  Goal: Absence of new skin breakdown  Description: 1.  Monitor for areas of redness and/or skin breakdown  2.  Assess vascular access sites hourly  3.  Every 4-6 hours minimum:  Change oxygen saturation probe site  4.  Every 4-6 hours:  If on nasal continuous positive airway pressure, respiratory therapy assess nares and determine need for appliance change or resting period.  2/16/2024 0033 by Valentina Damon RN  Outcome: Progressing  2/15/2024 1322 by Brittny Cha RN  Outcome: Progressing     Problem: Respiratory - Adult  Goal: Achieves optimal ventilation and oxygenation  2/16/2024 0033 by Valentina Damon RN  Outcome: Progressing  2/15/2024 2229 by Cassidy Jin, ORQUIDEA  Outcome: Progressing

## 2024-02-16 NOTE — PLAN OF CARE
Problem: Respiratory - Adult  Goal: Achieves optimal ventilation and oxygenation  2/16/2024 1319 by Toya Lebron RCP  Outcome: Progressing  2/16/2024 1208 by Timothy Garay RN  Outcome: Progressing  2/16/2024 0033 by Vlaentina Damon RN  Outcome: Progressing

## 2024-02-16 NOTE — PROGRESS NOTES
VASCULAR SURGERY  PROGRESS NOTE      2/16/2024 9:14 AM  Subjective:   Admit Date: 2/13/2024  PCP: Ricardo Khalil MD    No chief complaint on file.    Interval History: No complaints.  Doing well status post aortobifemoral bypass.  Mild swelling.    Diet: Diet NPO Exceptions are: Sips of Water with Meds, Ice Chips    Medications:   Scheduled Meds:   umeclidinium-vilanterol  1 puff Inhalation Daily    gabapentin  1,800 mg Oral Nightly    atorvastatin  40 mg Oral Nightly    aspirin  81 mg Oral Daily    sodium chloride flush  5-40 mL IntraVENous 2 times per day     Continuous Infusions:   sodium chloride      sodium chloride      sodium chloride      lactated ringers IV soln 100 mL/hr at 02/16/24 0549    sodium chloride           Labs:   CBC:   Recent Labs     02/14/24  0413 02/15/24  0335 02/15/24  0505 02/15/24  0904 02/16/24  0409   WBC 10.8 11.0  --   --  10.9   HGB 8.6* 6.1* 6.0* 7.8* 7.3*   * 97*  --   --  94*       BMP:    Recent Labs     02/14/24  0413 02/15/24  0335 02/16/24  0409    142 138   K 4.7 4.0 3.7    105 100   CO2 24 27 26   BUN 16 13 8   CREATININE 0.9 0.6* 0.4*   GLUCOSE 132* 105* 69*       Hepatic: No results for input(s): \"AST\", \"ALT\", \"ALB\", \"BILITOT\", \"ALKPHOS\" in the last 72 hours.  Troponin: Invalid input(s): \"TROPONIN\"  BNP: No results for input(s): \"BNP\" in the last 72 hours.  Lipids: No results for input(s): \"CHOL\", \"HDL\" in the last 72 hours.    Invalid input(s): \"LDLCALCU\"  INR: No results for input(s): \"INR\" in the last 72 hours.    Objective:   Vitals: /62   Pulse 78   Temp 98.6 °F (37 °C) (Temporal)   Resp 19   Ht 1.727 m (5' 7.99\")   Wt 56.1 kg (123 lb 10.9 oz)   SpO2 94%   BMI 18.81 kg/m²   General appearance: alert, cooperative and no distress  Mental Status: oriented to person, place and time with normal affect  Neck: good carotid pulses, no JVD  Lungs: clear to auscultation bilaterally, normal effort  Heart: regular rate and rhythm,  no murmur,  Abdomen: soft, incisional tenderness, nondistended, incisions clean and dry  Extremities: no edema, erythema or tenderness in the calves, palpable distal pulses  Skin: no gross lesions, rashes, or induration    Assessment:   64-year-old male doing well status post aortobifemoral bypass with aortoiliac and bilateral femoral endarterectomies, postoperative day #3    Patient Active Problem List:     Cervical spondylosis with myelopathy     Postprocedural pneumothorax     Pneumothorax     Aortoiliac occlusive disease (HCC)      Plan:   Full liquids and advance to soft diet if tolerated  25 g SPA/20 mg Lasix  Ambulate in halls  EPC cuffs  Incentive spirometer/deep breathing  Discontinue Durbin tonight  Plan Home tomorrow or Sunday if doing well    Electronically signed by Ras Ramires MD on 2/16/2024 at 9:14 AM

## 2024-02-16 NOTE — CARE COORDINATION
Discharge planning    Patient currently on CLD. Working on pain control . [facility] accepted for hc. Will need walker. RN aware. Plan home tomorrow or Sunday if doing well.

## 2024-02-16 NOTE — PROGRESS NOTES
Physical Therapy  Facility/Department: [facility]  Daily Treatment Note  NAME: Nato Beckham  : 1960  MRN: [id number]    Date of Service: 2024    Discharge Recommendations:  Patient would benefit from continued therapy after discharge    Pt currently functioning below baseline.  Recommend daily inpatient skilled therapy at time of discharge to maximize long term outcomes and prevent re-admission. Please refer to AM-PAC score for current level of function.     Patient Diagnosis(es): The primary encounter diagnosis was Aortic atherosclerosis (HCC). A diagnosis of Iliac artery occlusion (HCC) was also pertinent to this visit.    Assessment   Assessment: Patient able to decreased amount of assistance needed for bed mobility and gait but significantly limited by ABD pain, nausea, poor posture, aerobic capacity and decreased safety awareness. Patient would benefit from continued skilled PT services.  Activity Tolerance: Patient limited by endurance;Treatment limited secondary to medical complications     Plan    Physical Therapy Plan  General Plan: 5-7 times per week  Specific Instructions for Next Treatment: advance gait as able; Sit up in chair more, incentive spirometer; coughing with pillow for ABD bracing (patient very wheezy)  Current Treatment Recommendations: Strengthening;Balance training;Functional mobility training;Transfer training;Home exercise program;Gait training;Therapeutic activities;Patient/Caregiver education & training;Safety education & training     Restrictions  Restrictions/Precautions  Restrictions/Precautions: Fall Risk, General Precautions, Bed Alarm  Position Activity Restriction  Other position/activity restrictions: O2 via nasal cannula; Durbin cath; abdominal incision; B LE femoral incision, suction     Subjective    Subjective  Subjective: Patient sleeping in bed and agreeable for PT treatment.  Orientation  Overall Orientation Status: Within Functional Limits  Orientation  Level: Oriented to person  Cognition  Overall Cognitive Status: Exceptions  Arousal/Alertness: Appropriate responses to stimuli  Following Commands: Follows one step commands with increased time  Attention Span: Difficulty attending to directions;Difficulty dividing attention;Attends with cues to redirect  Memory: Appears intact  Safety Judgement: Decreased awareness of need for assistance;Decreased awareness of need for safety  Problem Solving: Assistance required to generate solutions;Assistance required to implement solutions  Insights: Decreased awareness of deficits  Initiation: Requires cues for some  Sequencing: Requires cues for some     Objective   Bed Mobility Training  Bed Mobility Training: Yes  Overall Level of Assistance: Minimum assistance  Interventions: Verbal cues;Safety awareness training;Weight shifting training/pressure relief;Tactile cues  Rolling: Minimum assistance  Supine to Sit: Minimum assistance;Moderate assistance (Patient was becoming nauseous during bed mobility and required increased assistance. Not able to follow log roll technique.)  Scooting: Minimum assistance  Patient required  MAX VCs/tactile assist for sequencing, initation, awareness/assistnace with line mgmt, self pacing, hand placement and use of bed rails to increase overall safety/decrease fall risk. Patient sat EOB x 8+ mins d/t nausea and coughing fits. Pillow used for ABD bracing but patient wheezy while seated EOB. RN present and anti- nausea meds given prior to ambulation.     Balance  Sitting:  (SBA at EOB)  Standing:  (CGA-Min A with RW)    Transfer Training  Transfer Training: Yes  Overall Level of Assistance: Minimum assistance  Interventions: Verbal cues;Safety awareness training;Weight shifting training/pressure relief;Tactile cues  Sit to Stand: Minimum assistance  Stand to Sit: Minimum assistance   Patient requires VCs and tactile cues to push up from EOB or arms of the chair for proper sit to stand transfer,

## 2024-02-16 NOTE — PROGRESS NOTES
Consult  [employer]    Adult Hospitalist      Name: Nato Beckham  MRN: [id number]     Acct: [id number]  Room: 2035/2035-01    Admit Date: 2/13/2024  7:43 AM  PCP: Ricardo Khalil MD    Primary Problem  Principal Problem:    Aortoiliac occlusive disease (HCC)  Resolved Problems:    * No resolved hospital problems. *        Assesment:     Aortoiliac occlusion  Bilateral severe femoral occlusive disease  Status post aortobifemoral bypass 2/13/2024  Status post aortic and bilateral common Ilac artery endarterectomy  Status post bilateral femoral endarterectomy  Hypertensive renal disease  Mixed hyperlipidemia  Chronic obstructive pulmonary disease, unspecified  Degenerative disc disease, lumbar  Iron deficiency anemia  Anxiety disorder  Insomnia  Thrombocytopenia        Plan:     Admitted to CVTU  Monitor vitals closely  Keep SpO2 above 90%  I's and O's  IV fluids-LR at 100 mm/h  Pain control  Antiemetics as needed  NPO at this time  NG  Switch essential home medication to IV  Add parameters  Ativan IV as needed with caution for anxiety  Aspirin, Lipitor, Neurontin, Anoro  CBC, BMP  Discussed with RN  DVT and GI prophylaxis.  Family at bedside  All questions answered to their satisfaction    Thank you for consulting us!      Chief Complaint:     No chief complaint on file.    Medical management    History of Present Illness:        Patient seen and examined at bedside  Last 24-hour events reviewed with nursing  Patient says he feels better now  Pain better controlled  Able to move his limbs better  Diet advanced to clear liquids  Oxygen has been weaned off    Denies chest pain, dyspnea orthopnea  Denies nausea, vomiting   Denies orthopnea, headache, vision change  Denies paresthesias      Initial HPI  Nato Beckham is a 64 y.o.  male who presents with No chief complaint on file.    Patient underwent aortobifemoral bypass aortic and bilateral common iliac artery endarterectomy bilateral femoral  SURGERY Bilateral 2/13/2024    BILATERAL FEMORAL ENDARTERECTOMY performed by Ras Ramires MD at [facility] OR        Medications Prior to Admission:       Prior to Admission medications    Medication Sig Start Date End Date Taking? Authorizing Provider   aspirin 81 MG EC tablet Take 1 tablet by mouth daily   Yes Willis Chaparro MD   clopidogrel (PLAVIX) 75 MG tablet Take 1 tablet by mouth daily   Yes Willis Chaparro MD   atorvastatin (LIPITOR) 40 MG tablet Take 1 tablet by mouth daily   Yes Willis Chaparro MD   lisinopril (PRINIVIL;ZESTRIL) 5 MG tablet Take 1 tablet by mouth daily   Yes Willis Chaparro MD   albuterol sulfate HFA (VENTOLIN HFA) 108 (90 Base) MCG/ACT inhaler Inhale 2 puffs into the lungs every 4 hours as needed for Wheezing   Yes Willis Chaparro MD   ferrous sulfate (FE TABS 325) 325 (65 Fe) MG EC tablet Take 1 tablet by mouth 2 times daily (with meals)  Patient not taking: Reported on 1/15/2024 9/23/21   Latosha Lares MD   senna-docusate (STIMULANT LAXATIVE) 8.6-50 MG per tablet Take 1 tablet by mouth as needed for Constipation    Willis Chaparro MD   oxyCODONE-acetaminophen (PERCOCET)  MG per tablet Take 1 tablet by mouth every 6 hours as needed for Pain.    Willis Chaparro MD   ANORO ELLIPTA 62.5-25 MCG/INH AEPB inhaler Inhale 1 puff into the lungs daily 9/3/21   Willis Chaparro MD   Gabapentin, Once-Daily, (GRALISE) 600 MG TABS Take 1,800 mg by mouth nightly for 30 days.  Patient taking differently: Take 1,800 mg by mouth at bedtime. 5/25/21 2/13/24  Daniel Lua MD   cyclobenzaprine (FLEXERIL) 10 MG tablet Take 1 tablet by mouth 3 times daily as needed for Muscle spasms  Patient taking differently: Take 1 tablet by mouth 3 times daily as needed for Muscle spasms Prescription is for TID prn , but Pt reports he only takes once daily. 5/25/21   Daniel Lua MD   traZODone (DESYREL) 100 MG tablet Take 2 tablets by mouth nightly

## 2024-02-16 NOTE — PROGRESS NOTES
Occupational Therapy  Facility/Department: [facility] CVICU  Daily Treatment Note  NAME: Nato Beckham  : 1960  MRN: [id number]    Date of Service: 2024    Discharge Recommendations:  Patient would benefit from continued therapy after discharge     Patient Diagnosis(es): The primary encounter diagnosis was Aortic atherosclerosis (HCC). A diagnosis of Iliac artery occlusion (HCC) was also pertinent to this visit.     Assessment    Activity Tolerance: Patient limited by endurance;Treatment limited secondary to decreased cognition;Treatment limited secondary to medical complications  Discharge Recommendations: Patient would benefit from continued therapy after discharge      Plan   Occupational Therapy Plan  Times Per Week: 4-5x per week  Current Treatment Recommendations: Strengthening;Balance training;Functional mobility training;Endurance training;Patient/Caregiver education & training;Safety education & training;Equipment evaluation, education, & procurement;Self-Care / ADL;Positioning;Cognitive/Perceptual training;Cognitive reorientation     Restrictions   Restrictions/Precautions  Restrictions/Precautions: Fall Risk, General Precautions, Bed Alarm  Position Activity Restriction  Other position/activity restrictions: O2 via nasal cannula; Durbin cath; abdominal incision; B LE femoral incision, suction    Subjective   Subjective  Subjective: \"I think I would be better if this cough went away!\"  Orientation  Overall Orientation Status: Within Functional Limits  Orientation Level: Oriented to person  Cognition  Overall Cognitive Status: Exceptions  Arousal/Alertness: Appropriate responses to stimuli  Following Commands: Follows one step commands with increased time  Attention Span: Difficulty attending to directions;Difficulty dividing attention;Attends with cues to redirect  Memory: Appears intact  Safety Judgement: Decreased awareness of need for assistance;Decreased awareness of need for safety  Problem  Strategies;Orientation;Plan of Care;Transfer Training;Home Exercise Program;Energy Conservation  Education Provided Comments: OT POC, purpose of OT in acute care, re-orientation, transfer tech, benefits of OOB activity, call light/fall prevention  Education Method: Verbal;Demonstration  Barriers to Learning: Cognition  Education Outcome: Continued education needed    Goals  Short Term Goals  Time Frame for Short Term Goals: by discharge, pt to demo  Short Term Goal 1: CGA for bed mob tech with bed rails/controls as needed  Short Term Goal 2: CGA for ADL transfers and functional mob with AD and Good safety  Short Term Goal 3: SUP for UB ADLs and Min A for LB ADLs with AE as needed and Good safety  Short Term Goal 4: SUP for simple grooming tasks while seated in chair or in appropriate position while in bed  Short Term Goal 5: pt/family to be IND with EC/WS, fall prevention tech, pressure relief education, discharge recommendations, disease specific eduation, AE/DME recommendations with use of handouts as needed  Patient Goals   Patient goals : pt unable to state    AM-PAC - ADL  AM-PAC Daily Activity - Inpatient   How much help is needed for putting on and taking off regular lower body clothing?: A Lot  How much help is needed for bathing (which includes washing, rinsing, drying)?: A Lot  How much help is needed for toileting (which includes using toilet, bedpan, or urinal)?: A Lot  How much help is needed for putting on and taking off regular upper body clothing?: A Lot  How much help is needed for taking care of personal grooming?: A Little  How much help for eating meals?: A Little  AM-PAC Inpatient Daily Activity Raw Score: 14  AM-PAC Inpatient ADL T-Scale Score : 33.39  ADL Inpatient CMS 0-100% Score: 59.67  ADL Inpatient CMS G-Code Modifier : CK    Therapy Time   Individual Concurrent Group Co-treatment   Time In 1321         Time Out 1345         Minutes 24          Upon writer exit, call light within reach,  pt retired to chair. All lines intact and patient positioned comfortably. All patient needs addressed prior to ending therapy session. Chart reviewed prior to treatment and patient is agreeable for therapy.  RN reports patient is medically stable for therapy treatment this date.       HADLEY Marcial/L    Upon Co-Signature of this note, appropriate supervision of DENIZ has been delivered with regards to plan of care, evaluation/re-evaluation findings, any appropriate modifications to treatment plan, and relevant discharge planning. Instructions and training unique to this patient and this practitioner have been considered and implemented.

## 2024-02-16 NOTE — PROGRESS NOTES
End Of Shift Note  [facility]  Summary of shift: POD #2, Pt has periods of anxiety, feels SOB, needs lots of encouragement. Did sleep for a good couple hours and felt rested, sat in chair for about 1 hour. Taking water without difficulty, feels hungry. Not passing gas but has had a few belches. Pulses are palpable. Hands are edematous. Sinus rhythm    Vitals:    Vitals:    02/16/24 0003 02/16/24 0103 02/16/24 0200 02/16/24 0403   BP: 128/72 117/70 122/69 125/75   Pulse: 77 70 71 82   Resp: 19 18 18 19   Temp:    97.3 °F (36.3 °C)   TempSrc:    Temporal   SpO2:  100% 100% 98%   Weight:       Height:            I&O:   Intake/Output Summary (Last 24 hours) at 2/16/2024 0511  Last data filed at 2/15/2024 2050  Gross per 24 hour   Intake 4620.55 ml   Output 1740 ml   Net 2880.55 ml       Resp Status: Lung sounds exp wheezes, O2 @ 2L NC    Ventilator Settings:     / / /     Critical Care IV infusions:   sodium chloride      sodium chloride      sodium chloride      lactated ringers IV soln 100 mL/hr at 02/16/24 0019    sodium chloride          LDA:   Peripheral IV 02/15/24 Right Antecubital (Active)   Number of days: 0       Peripheral IV 02/15/24 Right Forearm (Active)   Number of days: 0       Urinary Catheter 02/13/24 Durbin (Active)   Number of days: 2       Puncture 09/14/21 Back Right (Active)   Number of days: 884       Incision 02/13/24 Femoral Proximal;Right (Active)   Number of days: 2       Incision 02/13/24 Femoral Left;Proximal (Active)   Number of days: 2       Incision 02/13/24 Abdomen (Active)   Number of days: 2

## 2024-02-16 NOTE — DISCHARGE INSTR - COC
Continuity of Care Form    Patient Name: Nato Beckham   :  1960  MRN:  [id number]    Admit date:  2024  Discharge date:  24    Code Status Order: Full Code   Advance Directives:   Advance Care Flowsheet Documentation       Date/Time Healthcare Directive Type of Healthcare Directive Copy in Chart Healthcare Agent Appointed Healthcare Agent's Name Healthcare Agent's Phone Number    24 0818 No, patient does not have an advance directive for healthcare treatment -- -- -- -- --            Admitting Physician:  Ras Ramires MD  PCP: Ricardo Khalil MD    Discharging Nurse: Angie Stovall Rn  Discharging Hospital Unit/Room#:   Discharging Unit Phone Number: [phone]    Emergency Contact:   Extended Emergency Contact Information  Primary Emergency Contact: Deana Beckham  Home Phone: [phone]  Work Phone: [phone]  Mobile Phone: [phone]  Relation: Spouse    Past Surgical History:  Past Surgical History:   Procedure Laterality Date    AORTO-FEMORAL BYPASS GRAFT Bilateral 2024    AORTO BIFEM BYPASS - CELLSAVER performed by Ras Ramires MD at [facility] OR    BACK SURGERY  2018    lumbar (2) disc removed and axel placed    CERVICAL FUSION N/A 2021    C 4-6 ACDF        HARDWARE REMOVAL        C6-7 PLATE - [employer]   NEURO MONITORING performed by Daniel Lua MD at [facility] OR    CHOLECYSTECTOMY      CT GUIDED CHEST TUBE  2021    CT GUIDED CHEST TUBE 2021 [facility] CT SCAN    CT GUIDED CHEST TUBE  2021    CT GUIDED CHEST TUBE 2021 [facility] CT SCAN    CT NEEDLE BIOPSY LUNG PERCUTANEOUS  2021    CT NEEDLE BIOPSY LUNG PERCUTANEOUS 2021 [facility] CT SCAN. Spot on lung, biopsied negative per patient    NECK SURGERY      cage and titanium disc    VASCULAR SURGERY Bilateral 2024    BILATERAL FEMORAL ENDARTERECTOMY performed by Ras Ramires MD at [facility] OR       Immunization History:   Immunization History   Administered Date(s)

## 2024-02-16 NOTE — PROGRESS NOTES
RN attempted to walk patient a fourth time. Patient refused fourth daily walk. Nausea continues. Not tolerating diet, refused dinner and is dry heaving when ambulating.

## 2024-02-16 NOTE — PLAN OF CARE
Problem: Discharge Planning  Goal: Discharge to home or other facility with appropriate resources  2/16/2024 1208 by Timothy Garay RN  Outcome: Progressing  2/16/2024 0033 by aVlentina Damon RN  Outcome: Progressing  Flowsheets (Taken 2/15/2024 2000)  Discharge to home or other facility with appropriate resources:   Identify barriers to discharge with patient and caregiver   Refer to discharge planning if patient needs post-hospital services based on physician order or complex needs related to functional status, cognitive ability or social support system     Problem: Pain  Goal: Verbalizes/displays adequate comfort level or baseline comfort level  2/16/2024 1208 by Timothy Garay RN  Outcome: Progressing  2/16/2024 0033 by Valentina Damon RN  Outcome: Progressing     Problem: ABCDS Injury Assessment  Goal: Absence of physical injury  2/16/2024 1208 by Timothy Garay RN  Outcome: Progressing  2/16/2024 0033 by Valentina Damon RN  Outcome: Progressing  Flowsheets (Taken 2/15/2024 2000)  Absence of Physical Injury: Implement safety measures based on patient assessment     Problem: Safety - Adult  Goal: Free from fall injury  2/16/2024 1208 by Timothy Garay RN  Outcome: Progressing  2/16/2024 0033 by Valentina Damon RN  Outcome: Progressing  Flowsheets (Taken 2/15/2024 2000)  Free From Fall Injury: Based on caregiver fall risk screen, instruct family/caregiver to ask for assistance with transferring infant if caregiver noted to have fall risk factors     Problem: Skin/Tissue Integrity  Goal: Absence of new skin breakdown  Description: 1.  Monitor for areas of redness and/or skin breakdown  2.  Assess vascular access sites hourly  3.  Every 4-6 hours minimum:  Change oxygen saturation probe site  4.  Every 4-6 hours:  If on nasal continuous positive airway pressure, respiratory therapy assess nares and determine need for appliance change or resting period.  2/16/2024 1208 by Timothy Garay

## 2024-02-16 NOTE — PLAN OF CARE
Problem: Respiratory - Adult  Goal: Achieves optimal ventilation and oxygenation  Outcome: Progressing      anxiety

## 2024-02-16 NOTE — PROGRESS NOTES
End Of Shift Note  [facility]  Summary of shift: Pt had eventful day. Pt ambulated 4x with walker, tolerated fairly well. Pt occassionally nauseous with ambulation still. Morphine and percocet given x2. Bowel signs active, pt had 1 BM. Durbin removed, patient has yet to void. Incisions still intact via steri strips. Clear liquid diet advanced to bland easy to chew diet. 25g albumin given, followed by 20mg Lasix. Output of 1150. K after diuresing was 3.3. Bag 1/4 of IV K infusing now.    Vitals:    Vitals:    02/16/24 1300 02/16/24 1400 02/16/24 1554 02/16/24 1600   BP: 113/68  125/73 136/78   Pulse: 97 95 76 84   Resp:       Temp:   98.4 °F (36.9 °C)    TempSrc:   Temporal    SpO2: 98% 97% 99% 98%   Weight:       Height:            I&O:   Intake/Output Summary (Last 24 hours) at 2/16/2024 1727  Last data filed at 2/16/2024 1413  Gross per 24 hour   Intake 3085.36 ml   Output 2150 ml   Net 935.36 ml       Resp Status: RA    Ventilator Settings:     / / /     Critical Care IV infusions:   sodium chloride      sodium chloride      sodium chloride      lactated ringers IV soln Stopped (02/16/24 1322)    sodium chloride          LDA:   Peripheral IV 02/15/24 Right Antecubital (Active)   Number of days: 1       Peripheral IV 02/15/24 Right Forearm (Active)   Number of days: 1       Puncture 09/14/21 Back Right (Active)   Number of days: 885       Incision 02/13/24 Femoral Proximal;Right (Active)   Number of days: 3       Incision 02/13/24 Femoral Left;Proximal (Active)   Number of days: 3       Incision 02/13/24 Abdomen (Active)   Number of days: 3

## 2024-02-17 VITALS
WEIGHT: 123.9 LBS | BODY MASS INDEX: 18.78 KG/M2 | SYSTOLIC BLOOD PRESSURE: 105 MMHG | OXYGEN SATURATION: 98 % | TEMPERATURE: 98.2 F | RESPIRATION RATE: 18 BRPM | HEIGHT: 68 IN | DIASTOLIC BLOOD PRESSURE: 68 MMHG | HEART RATE: 91 BPM

## 2024-02-17 LAB
ABO/RH: NORMAL
ANION GAP SERPL CALCULATED.3IONS-SCNC: 9 MMOL/L (ref 9–17)
ANTIBODY SCREEN: NEGATIVE
ARM BAND NUMBER: NORMAL
BLOOD BANK DISPENSE STATUS: NORMAL
BLOOD BANK SAMPLE EXPIRATION: NORMAL
BPU ID: NORMAL
BUN SERPL-MCNC: 6 MG/DL (ref 8–23)
BUN/CREAT SERPL: 12 (ref 9–20)
CA-I BLD-SCNC: 1.05 MMOL/L (ref 1.15–1.33)
CALCIUM SERPL-MCNC: 8 MG/DL (ref 8.6–10.4)
CHLORIDE SERPL-SCNC: 102 MMOL/L (ref 98–107)
CO2 SERPL-SCNC: 30 MMOL/L (ref 20–31)
COMPONENT: NORMAL
CREAT SERPL-MCNC: 0.5 MG/DL (ref 0.7–1.2)
CROSSMATCH RESULT: NORMAL
ERYTHROCYTE [DISTWIDTH] IN BLOOD BY AUTOMATED COUNT: 15 % (ref 11.8–14.4)
GFR SERPL CREATININE-BSD FRML MDRD: >60 ML/MIN/1.73M2
GLUCOSE SERPL-MCNC: 92 MG/DL (ref 70–99)
HCT VFR BLD AUTO: 22.2 % (ref 40.7–50.3)
HGB BLD-MCNC: 7.5 G/DL (ref 13–17)
MAGNESIUM SERPL-MCNC: 2.1 MG/DL (ref 1.6–2.6)
MCH RBC QN AUTO: 33 PG (ref 25.2–33.5)
MCHC RBC AUTO-ENTMCNC: 33.8 G/DL (ref 28.4–34.8)
MCV RBC AUTO: 97.8 FL (ref 82.6–102.9)
NRBC BLD-RTO: 0 PER 100 WBC
PHOSPHATE SERPL-MCNC: 2.4 MG/DL (ref 2.5–4.5)
PLATELET # BLD AUTO: 125 K/UL (ref 138–453)
PMV BLD AUTO: 9.8 FL (ref 8.1–13.5)
POTASSIUM SERPL-SCNC: 3.8 MMOL/L (ref 3.7–5.3)
RBC # BLD AUTO: 2.27 M/UL (ref 4.21–5.77)
SODIUM SERPL-SCNC: 141 MMOL/L (ref 135–144)
TRANSFUSION STATUS: NORMAL
UNIT DIVISION: 0
WBC OTHER # BLD: 9.6 K/UL (ref 3.5–11.3)

## 2024-02-17 PROCEDURE — 97530 THERAPEUTIC ACTIVITIES: CPT

## 2024-02-17 PROCEDURE — 94761 N-INVAS EAR/PLS OXIMETRY MLT: CPT

## 2024-02-17 PROCEDURE — 2580000003 HC RX 258: Performed by: SURGERY

## 2024-02-17 PROCEDURE — 6370000000 HC RX 637 (ALT 250 FOR IP): Performed by: SURGERY

## 2024-02-17 PROCEDURE — 6360000002 HC RX W HCPCS: Performed by: SURGERY

## 2024-02-17 PROCEDURE — 80048 BASIC METABOLIC PNL TOTAL CA: CPT

## 2024-02-17 PROCEDURE — 94640 AIRWAY INHALATION TREATMENT: CPT

## 2024-02-17 PROCEDURE — 82330 ASSAY OF CALCIUM: CPT

## 2024-02-17 PROCEDURE — 36415 COLL VENOUS BLD VENIPUNCTURE: CPT

## 2024-02-17 PROCEDURE — 6370000000 HC RX 637 (ALT 250 FOR IP): Performed by: FAMILY MEDICINE

## 2024-02-17 PROCEDURE — 97110 THERAPEUTIC EXERCISES: CPT

## 2024-02-17 PROCEDURE — 85027 COMPLETE CBC AUTOMATED: CPT

## 2024-02-17 PROCEDURE — 97116 GAIT TRAINING THERAPY: CPT

## 2024-02-17 PROCEDURE — 84100 ASSAY OF PHOSPHORUS: CPT

## 2024-02-17 PROCEDURE — 2700000000 HC OXYGEN THERAPY PER DAY

## 2024-02-17 PROCEDURE — 83735 ASSAY OF MAGNESIUM: CPT

## 2024-02-17 RX ORDER — DOCUSATE SODIUM 100 MG/1
100 CAPSULE, LIQUID FILLED ORAL DAILY PRN
COMMUNITY

## 2024-02-17 RX ORDER — OXYCODONE HYDROCHLORIDE AND ACETAMINOPHEN 5; 325 MG/1; MG/1
2 TABLET ORAL EVERY 6 HOURS PRN
Status: DISCONTINUED | OUTPATIENT
Start: 2024-02-17 | End: 2024-02-17 | Stop reason: HOSPADM

## 2024-02-17 RX ORDER — OXYCODONE HYDROCHLORIDE AND ACETAMINOPHEN 5; 325 MG/1; MG/1
2 TABLET ORAL EVERY 6 HOURS PRN
Qty: 40 TABLET | Refills: 0 | Status: SHIPPED | OUTPATIENT
Start: 2024-02-17 | End: 2024-02-24

## 2024-02-17 RX ORDER — TETRAHYDROZOLINE HCL 0.05 %
1 DROPS OPHTHALMIC (EYE) 3 TIMES DAILY PRN
COMMUNITY

## 2024-02-17 RX ORDER — TRAZODONE HYDROCHLORIDE 100 MG/1
200 TABLET ORAL NIGHTLY
Status: DISCONTINUED | OUTPATIENT
Start: 2024-02-17 | End: 2024-02-17 | Stop reason: HOSPADM

## 2024-02-17 RX ORDER — OXYCODONE HYDROCHLORIDE AND ACETAMINOPHEN 5; 325 MG/1; MG/1
1 TABLET ORAL EVERY 6 HOURS PRN
Status: DISCONTINUED | OUTPATIENT
Start: 2024-02-17 | End: 2024-02-17 | Stop reason: HOSPADM

## 2024-02-17 RX ORDER — FAMOTIDINE 20 MG/1
20 TABLET, FILM COATED ORAL DAILY PRN
COMMUNITY

## 2024-02-17 RX ADMIN — SODIUM CHLORIDE, POTASSIUM CHLORIDE, SODIUM LACTATE AND CALCIUM CHLORIDE: 600; 310; 30; 20 INJECTION, SOLUTION INTRAVENOUS at 03:56

## 2024-02-17 RX ADMIN — MORPHINE SULFATE 2 MG: 2 INJECTION, SOLUTION INTRAMUSCULAR; INTRAVENOUS at 13:51

## 2024-02-17 RX ADMIN — OXYCODONE AND ACETAMINOPHEN 1 TABLET: 325; 10 TABLET ORAL at 09:57

## 2024-02-17 RX ADMIN — ALBUTEROL SULFATE 2 PUFF: 90 AEROSOL, METERED RESPIRATORY (INHALATION) at 00:17

## 2024-02-17 RX ADMIN — ALBUTEROL SULFATE 2 PUFF: 90 AEROSOL, METERED RESPIRATORY (INHALATION) at 09:49

## 2024-02-17 RX ADMIN — ASPIRIN 81 MG CHEWABLE TABLET 81 MG: 81 TABLET CHEWABLE at 09:35

## 2024-02-17 RX ADMIN — OXYCODONE AND ACETAMINOPHEN 1 TABLET: 325; 10 TABLET ORAL at 03:57

## 2024-02-17 RX ADMIN — CALCIUM GLUCONATE 1000 MG: 10 INJECTION, SOLUTION INTRAVENOUS at 09:39

## 2024-02-17 ASSESSMENT — PAIN DESCRIPTION - ORIENTATION
ORIENTATION: MID
ORIENTATION: MID

## 2024-02-17 ASSESSMENT — PAIN DESCRIPTION - DESCRIPTORS
DESCRIPTORS: ACHING

## 2024-02-17 ASSESSMENT — PAIN SCALES - GENERAL
PAINLEVEL_OUTOF10: 8
PAINLEVEL_OUTOF10: 6
PAINLEVEL_OUTOF10: 8
PAINLEVEL_OUTOF10: 4
PAINLEVEL_OUTOF10: 8

## 2024-02-17 ASSESSMENT — PAIN DESCRIPTION - LOCATION
LOCATION: ABDOMEN

## 2024-02-17 ASSESSMENT — PAIN DESCRIPTION - ONSET: ONSET: ON-GOING

## 2024-02-17 ASSESSMENT — PAIN DESCRIPTION - PAIN TYPE: TYPE: SURGICAL PAIN

## 2024-02-17 ASSESSMENT — PAIN - FUNCTIONAL ASSESSMENT: PAIN_FUNCTIONAL_ASSESSMENT: PREVENTS OR INTERFERES SOME ACTIVE ACTIVITIES AND ADLS

## 2024-02-17 ASSESSMENT — PAIN DESCRIPTION - FREQUENCY: FREQUENCY: CONTINUOUS

## 2024-02-17 NOTE — PROGRESS NOTES
Nato Beckham requires a bedside commode due to being confined to one level of the home and there are no toilet facilities on that level, and is physically incapable of utilizing regular toilet facilities. Current body weight: Weight - Scale: 56.2 kg (123 lb 14.4 oz).

## 2024-02-17 NOTE — PROGRESS NOTES
VASCULAR SURGERY  PROGRESS NOTE      2/17/2024 4:20 PM  Subjective:   Admit Date: 2/13/2024  PCP: Ricardo Khalil MD    No chief complaint on file.    Interval History: No complaints.  Doing very well.  Diuresed yesterday.  Tolerating diet and ambulating well.    Diet: ADULT DIET; Easy to Chew; GI Hooker (GERD/Peptic Ulcer)    Medications:   Scheduled Meds:   traZODone  200 mg Oral Nightly    albuterol sulfate HFA  2 puff Inhalation BID RT    umeclidinium-vilanterol  1 puff Inhalation Daily    gabapentin  1,800 mg Oral Nightly    atorvastatin  40 mg Oral Nightly    aspirin  81 mg Oral Daily    sodium chloride flush  5-40 mL IntraVENous 2 times per day     Continuous Infusions:   sodium chloride      sodium chloride      sodium chloride      sodium chloride           Labs:   CBC:   Recent Labs     02/15/24  0335 02/15/24  0505 02/15/24  0904 02/16/24  0409 02/17/24  0308   WBC 11.0  --   --  10.9 9.6   HGB 6.1*   < > 7.8* 7.3* 7.5*   PLT 97*  --   --  94* 125*    < > = values in this interval not displayed.       BMP:    Recent Labs     02/15/24  0335 02/16/24  0409 02/16/24  1604 02/17/24  0308    138  --  141   K 4.0 3.7 3.3* 3.8    100  --  102   CO2 27 26  --  30   BUN 13 8  --  6*   CREATININE 0.6* 0.4*  --  0.5*   GLUCOSE 105* 69*  --  92       Hepatic: No results for input(s): \"AST\", \"ALT\", \"ALB\", \"BILITOT\", \"ALKPHOS\" in the last 72 hours.  Troponin: Invalid input(s): \"TROPONIN\"  BNP: No results for input(s): \"BNP\" in the last 72 hours.  Lipids: No results for input(s): \"CHOL\", \"HDL\" in the last 72 hours.    Invalid input(s): \"LDLCALCU\"  INR: No results for input(s): \"INR\" in the last 72 hours.    Objective:   Vitals: /68   Pulse 91   Temp 98.7 °F (37.1 °C) (Temporal)   Resp 18   Ht 1.727 m (5' 7.99\")   Wt 56.2 kg (123 lb 14.4 oz)   SpO2 98%   BMI 18.84 kg/m²   General appearance: alert, cooperative and no distress  Mental Status: oriented to person, place and time with

## 2024-02-17 NOTE — CARE COORDINATION
DC Planning     IMM letter provided to patient.  Patient offered four hours to make informed decision regarding appeal process; patient agreeable to discharge.

## 2024-02-17 NOTE — PLAN OF CARE
Problem: Respiratory - Adult  Goal: Achieves optimal ventilation and oxygenation  2/16/2024 1937 by Selin Pineda RCP  Outcome: Progressing

## 2024-02-17 NOTE — PROGRESS NOTES
Consult  [facility] [employer]    Adult Hospitalist      Name: Nato Beckham  MRN: [id number]     Acct: [id number]  Room: 2035/2035-01    Admit Date: 2/13/2024  7:43 AM  PCP: Ricardo Khalil MD    Primary Problem  Principal Problem:    Aortoiliac occlusive disease (HCC)  Resolved Problems:    * No resolved hospital problems. *        Assesment:     Aortoiliac occlusion  Bilateral severe femoral occlusive disease  Status post aortobifemoral bypass 2/13/2024  Status post aortic and bilateral common [place]ac artery endarterectomy  Status post bilateral femoral endarterectomy  Hypertensive renal disease  Mixed hyperlipidemia  Chronic obstructive pulmonary disease, unspecified  Degenerative disc disease, lumbar  Iron deficiency anemia  Anxiety disorder  Insomnia  Thrombocytopenia        Plan:     Admitted to CVTU  Monitor vitals closely  Keep SpO2 above 90%  I's and O's  IV fluids-LR at 100 mm/h  Pain control  Antiemetics as needed  NPO at this time- adv diet to full liquids  NG- DC  Switch essential home medication to IV  Add parameters  Ativan IV as needed with caution for anxiety  Aspirin, Lipitor, Neurontin, Anoro  CBC, BMP  Discussed with RN  Adv ambulate in [place]  EPC   IS  Durbin   25G SPA and Lasix 20 mg IV once per Vascular surgery   DVT and GI prophylaxis.      Thank you for consulting us!      Chief Complaint:     No chief complaint on file.    Medical management    History of Present Illness:        Patient seen and examined at bedside  Last 24-hour events reviewed with nursing  Patient says he feels better now  Pain better controlled  Able to move his limbs better  Diet advanced to full liquids    Denies chest pain, dyspnea orthopnea  Denies nausea, vomiting   Denies orthopnea, headache, vision change  Denies paresthesias      Initial HPI  Nato Beckham is a 64 y.o.  male who presents with No chief complaint on file.    Patient underwent aortobifemoral bypass aortic and bilateral common iliac    .8* 104.2*  --   --  98.2   MCH 32.6 32.1  --   --  32.0   MCHC 30.5 30.8  --   --  32.6   RDW 14.0 14.0  --   --  15.8*   * 97*  --   --  94*   MPV  --  10.2  --   --  9.9       Chemistry:  Recent Labs     02/14/24  0413 02/15/24  0335 02/16/24  0409 02/16/24  1604    142 138  --    K 4.7 4.0 3.7 3.3*    105 100  --    CO2 24 27 26  --    GLUCOSE 132* 105* 69*  --    BUN 16 13 8  --    CREATININE 0.9 0.6* 0.4*  --    MG 2.2 2.1 2.0  --    ANIONGAP 11 10 12  --    LABGLOM >60 >60 >60  --    CALCIUM 7.6* 7.6* 7.9*  --    CAION  --   --   --  1.13   PHOS 4.9* 2.3* 2.5  --        No results for input(s): \"PROT\", \"LABALBU\", \"LABA1C\", \"V9RDRYQ\", \"M3IZMEI\", \"FT4\", \"TSH\", \"AST\", \"ALT\", \"LDH\", \"GGT\", \"ALKPHOS\", \"LABGGT\", \"BILITOT\", \"BILIDIR\", \"AMMONIA\", \"AMYLASE\", \"LIPASE\", \"LACTATE\", \"CHOL\", \"HDL\", \"LDLCHOLESTEROL\", \"CHOLHDLRATIO\", \"TRIG\", \"VLDL\", \"AGN08OE\", \"PHENYTOIN\", \"PHENYF\", \"URICACID\", \"POCGLU\" in the last 72 hours.      Lab Results   Component Value Date    INR 1.0 01/15/2024    INR 1.0 09/15/2021    INR 0.9 09/14/2021    PROTIME 13.3 01/15/2024    PROTIME 13.3 09/15/2021    PROTIME 12.4 09/14/2021       No results found for: \"SPECIAL\"  No results found for: \"CULTURE\"    Lab Results   Component Value Date/Time    POCPH 7.249 02/13/2024 03:23 PM    POCPCO2 44.7 02/13/2024 03:23 PM    POCPO2 187.6 02/13/2024 03:23 PM    POCHCO3 19.5 02/13/2024 03:23 PM    NBEA 7.4 02/13/2024 03:23 PM    KBNV6WSG 99.4 02/13/2024 03:23 PM       Radiology:    XR CHEST PORTABLE    Result Date: 2/13/2024  No acute cardiopulmonary process.         All radiological studies reviewed                Code Status:  Full Code    Electronically signed by Bharti Mccartney MD on 2/16/2024 at 8:30 PM     Copy sent to Ricardo Booker MD    This note was created with the assistance of a speech-recognition program.  Although the intention is to generate a document that actually reflects the content of the visit, no  guarantees can be provided that every mistake has been identified and corrected by editing.     Note was updated later by me after  physical examination and  completion of the assessment.

## 2024-02-17 NOTE — PROGRESS NOTES
Discharge instructions reviewed with patient.  All concerns/questions addressed and answered and patient verbalized understanding of POC.  Script given to patient for pain medication so patient can take to pharmacy tomorrow.  Patient discharged with all belongings via wheelchair to main entrance to private vehicle.

## 2024-02-17 NOTE — CARE COORDINATION
DC Planning      Spoke with therapy-they recommend pt to go to acute rehab-spoke with pt and he declines. Pt does have 14 stairs to climb at home to upper level bed/bathroom. He relays his wife will be home this week to help with needs. He will need a commode for home. He has a walker at home. He is has been weaned off 02. Does not qualify 92% on RA with activity.     Spoke with [employer] -pt can  commode on Monday-if approved but will not be processed until then. Unfortunately, will have the same problem with all other DME companies and obtaining commode on weekend. 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 Faxed commode order face to face and face sheet to [employer].     Discussed options for purchase his wife will purchase a commode on line.       Spoke with Paloma from [facility] informed of pt dc.

## 2024-02-17 NOTE — PROGRESS NOTES
End Of Shift Note  [facility]  Summary of shift: pt had uneventful shift. Pt voided in urinal. Pt still in abd pain but as night progressed, pain level has come down with PRN pain meds given. Hemodynamically stable.     Vitals:    Vitals:    02/17/24 0000 02/17/24 0017 02/17/24 0400 02/17/24 0800   BP:   128/75 105/68   Pulse:  91 77 81   Resp: 16  18    Temp: 97.5 °F (36.4 °C)  97.5 °F (36.4 °C) 98.2 °F (36.8 °C)   TempSrc: Temporal  Temporal Temporal   SpO2:  94% 100% 97%   Weight:   56.2 kg (123 lb 14.4 oz)    Height:            I&O:   Intake/Output Summary (Last 24 hours) at 2/17/2024 0828  Last data filed at 2/17/2024 0800  Gross per 24 hour   Intake 1764.28 ml   Output 1775 ml   Net -10.72 ml       Resp Status: 2 L NC    Ventilator Settings:     / / /     Critical Care IV infusions:   sodium chloride      sodium chloride      sodium chloride      lactated ringers IV soln 75 mL/hr at 02/17/24 0356    sodium chloride          LDA:   Peripheral IV 02/15/24 Right Antecubital (Active)   Number of days: 1       Peripheral IV 02/15/24 Right Forearm (Active)   Number of days: 1       Puncture 09/14/21 Back Right (Active)   Number of days: 885       Incision 02/13/24 Femoral Proximal;Right (Active)   Number of days: 3       Incision 02/13/24 Femoral Left;Proximal (Active)   Number of days: 3       Incision 02/13/24 Abdomen (Active)   Number of days: 3

## 2024-02-17 NOTE — PLAN OF CARE
Problem: Discharge Planning  Goal: Discharge to home or other facility with appropriate resources  Outcome: Progressing  Flowsheets (Taken 2/16/2024 2000)  Discharge to home or other facility with appropriate resources:   Identify barriers to discharge with patient and caregiver   Arrange for needed discharge resources and transportation as appropriate     Problem: Pain  Goal: Verbalizes/displays adequate comfort level or baseline comfort level  Outcome: Progressing     Problem: ABCDS Injury Assessment  Goal: Absence of physical injury  Outcome: Progressing     Problem: Safety - Adult  Goal: Free from fall injury  Outcome: Progressing  Flowsheets  Taken 2/17/2024 0500  Free From Fall Injury:   Instruct family/caregiver on patient safety   Based on caregiver fall risk screen, instruct family/caregiver to ask for assistance with transferring infant if caregiver noted to have fall risk factors  Taken 2/16/2024 2343  Free From Fall Injury:   Instruct family/caregiver on patient safety   Based on caregiver fall risk screen, instruct family/caregiver to ask for assistance with transferring infant if caregiver noted to have fall risk factors     Problem: Skin/Tissue Integrity  Goal: Absence of new skin breakdown  Description: 1.  Monitor for areas of redness and/or skin breakdown  2.  Assess vascular access sites hourly  3.  Every 4-6 hours minimum:  Change oxygen saturation probe site  4.  Every 4-6 hours:  If on nasal continuous positive airway pressure, respiratory therapy assess nares and determine need for appliance change or resting period.  Outcome: Progressing     Problem: Respiratory - Adult  Goal: Achieves optimal ventilation and oxygenation  2/17/2024 0827 by Gordy Sanders, RN  Outcome: Progressing  Flowsheets (Taken 2/16/2024 2000)  Achieves optimal ventilation and oxygenation:   Assess for changes in respiratory status   Assess for changes in mentation and behavior  2/16/2024 1937 by Selin Pineda,

## 2024-02-17 NOTE — DISCHARGE SUMMARY
VASCULAR SURGERY   DISCHARGE SUMMARY      Patient Identification  Nato Beckham is a 64 y.o. male.  :  1960  Admit Date:  2024    Discharge date:   No discharge date for patient encounter.                                   Disposition: home    Discharge Diagnoses:   Patient Active Problem List   Diagnosis    Cervical spondylosis with myelopathy    Postprocedural pneumothorax    Pneumothorax    Aortoiliac occlusive disease (HCC)         Consults: IM    Surgery: Aortobifemoral bypass  Bilateral femoral endarterectomy  Aortic and bilateral iliac endarterectomy    Patient Instructions:   Activity: no heavy lifting, pushing, pulling for 6 weeks, no driving for 2 weeks or while on analgesics  Diet: As tolerated  Follow-up with Ras Ramires MD  in 4 weeks.    See pre-printed instructions in chart and given to patient upon discharge.    Discharge Medications:      Medication List      START taking these medications     oxyCODONE-acetaminophen 5-325 MG per tablet; Commonly known as:   Percocet; Take 2 tablets by mouth every 6 hours as needed for Pain for up   to 7 days. Intended supply: 7 days. Take lowest dose possible to manage   pain Max Daily Amount: 8 tablets; Replaces: oxyCODONE-acetaminophen    MG per tablet     CHANGE how you take these medications     Gralise 600 MG Tabs; Generic drug: Gabapentin (Once-Daily); Take 1,800   mg by mouth nightly for 30 days.; What changed: when to take this     CONTINUE taking these medications     Anoro Ellipta 62.5-25 MCG/ACT inhaler; Generic drug:   umeclidinium-vilanterol   aspirin 81 MG EC tablet   atorvastatin 40 MG tablet; Commonly known as: LIPITOR   clopidogrel 75 MG tablet; Commonly known as: PLAVIX   cyclobenzaprine 10 MG tablet; Commonly known as: FLEXERIL; Take 1 tablet   by mouth 3 times daily as needed for Muscle spasms   docusate sodium 100 MG capsule; Commonly known as: COLACE   famotidine 20 MG tablet; Commonly known as: PEPCID

## 2024-02-17 NOTE — PROGRESS NOTES
Physical Therapy  Facility/Department: [facility]  Daily Treatment Note  NAME: Nato Beckham  : 1960  MRN: [id number]    Date of Service: 2024    Discharge Recommendations:  Patient would benefit from continued therapy after discharge   PT Equipment Recommendations  Equipment Needed: No    Patient Diagnosis(es): The primary encounter diagnosis was Aortic atherosclerosis (HCC). A diagnosis of Iliac artery occlusion (HCC) was also pertinent to this visit.    Assessment   Assessment: Pt continues to require min to mod A for bed mobility, transfers, and ambulation d/t decreased strength and endurance. Pt states he has to navigate up 14-15 steps to access his bathroom and bedroom at home. Pt wife reports she has to work within a week. Pt was independently living at home before admission and would benefit from continued skilled therapy to maxmize long term outcomes and return to OF.  Activity Tolerance: Patient limited by endurance;Patient limited by fatigue;Patient limited by pain  Equipment Needed: No     Plan    Physical Therapy Plan  General Plan: 5-7 times per week  Specific Instructions for Next Treatment: advance gait as able; Sit up in chair more, incentive spirometer; coughing with pillow for ABD bracing (patient very wheezy)  Current Treatment Recommendations: Strengthening;Balance training;Functional mobility training;Transfer training;Home exercise program;Gait training;Therapeutic activities;Patient/Caregiver education & training;Safety education & training     Restrictions  Restrictions/Precautions  Restrictions/Precautions: Fall Risk, General Precautions, Bed Alarm  Position Activity Restriction  Other position/activity restrictions: O2 via nasal cannula; Durbin cath; abdominal incision; B LE femoral incision, suction     Subjective    Subjective  Subjective: Pt agreeable to PT session (Nurse gives approval for PT session)  Orientation  Overall Orientation Status: Within Functional

## 2024-02-17 NOTE — PROGRESS NOTES
Transitions of Care Pharmacy Service   Medication Review    The patient's list of current home medications has been reviewed.     Source(s) of information: Patient, Amyriandrew    Based on information provided by the above source(s), I have updated the patient's home med list.       PROVIDER ACTION REQUESTED  Medications that need to be addressed by a physician/nurse practitioner:    Medication Action Requested     trazodone     Patient requesting this is reconciled so he can sleep better tonight         Please feel free to call me with any questions about this encounter. Thank you.    Brittny Thornton [facility]   Transitions of Care Pharmacy Service  Phone:  [phone]  Fax: [phone]      Electronically signed by Brittny Thornton [facility] on 2/17/2024 at 2:12 PM     Medications Prior to Admission: docusate sodium (COLACE) 100 MG capsule, Take 1 capsule by mouth daily as needed for Constipation  tetrahydrozoline 0.05 % ophthalmic solution, Place 1 drop into both eyes 3 times daily as needed (allergies)  famotidine (PEPCID) 20 MG tablet, Take 1 tablet by mouth daily as needed (acid reflux)  aspirin 81 MG EC tablet, Take 1 tablet by mouth daily  clopidogrel (PLAVIX) 75 MG tablet, Take 1 tablet by mouth daily  atorvastatin (LIPITOR) 40 MG tablet, Take 1 tablet by mouth daily  lisinopril (PRINIVIL;ZESTRIL) 5 MG tablet, Take 1 tablet by mouth daily  albuterol sulfate HFA (VENTOLIN HFA) 108 (90 Base) MCG/ACT inhaler, Inhale 2 puffs into the lungs every 4 hours as needed for Wheezing  oxyCODONE-acetaminophen (PERCOCET)  MG per tablet, Take 1 tablet by mouth every 6 hours as needed for Pain.  ANORO ELLIPTA 62.5-25 MCG/INH AEPB inhaler, Inhale 1 puff into the lungs daily  Gabapentin, Once-Daily, (GRALISE) 600 MG TABS, Take 1,800 mg by mouth nightly for 30 days. (Patient taking differently: Take 1,800 mg by mouth at bedtime.)  cyclobenzaprine (FLEXERIL) 10 MG tablet, Take 1 tablet by mouth 3 times daily as needed for Muscle  spasms (Patient taking differently: Take 1 tablet by mouth 3 times daily as needed for Muscle spasms Prescription is for TID prn , but Pt reports he only takes once daily.)  traZODone (DESYREL) 100 MG tablet, Take 2 tablets by mouth nightly             For Pharmacy Admin Tracking Only  # meds added to list: 3  # meds removed from list: 2  # meds adjusted on list (dose/frequency/formulation): 0

## 2024-02-17 NOTE — PROGRESS NOTES
Consult  [facility]    Adult Hospitalist      Name: Nato Beckahm  MRN: [id number]     Acct: [id number]  Room: 2035/2035-01    Admit Date: 2/13/2024  7:43 AM  PCP: Ricardo Khlail MD    Primary Problem  Principal Problem:    Aortoiliac occlusive disease (HCC)  Resolved Problems:    * No resolved hospital problems. *        Assesment:     Aortoiliac occlusion  Bilateral severe femoral occlusive disease  Status post aortobifemoral bypass 2/13/2024  Status post aortic and bilateral common Ilac artery endarterectomy  Status post bilateral femoral endarterectomy  Hypertensive renal disease  Mixed hyperlipidemia  Chronic obstructive pulmonary disease, unspecified  Degenerative disc disease, lumbar  Iron deficiency anemia  Anxiety disorder  Insomnia  Thrombocytopenia        Plan:     Admitted to CVTU  Monitor vitals closely  Keep SpO2 above 90%  I's and O's  IV fluids-LR at 100 mm/h  Pain control  Antiemetics as needed  NPO at this time- adv diet to full liquids  NG- DC  Switch essential home medication to IV  Add parameters  Ativan IV as needed with caution for anxiety  Aspirin, Lipitor, Neurontin, Anoro  CBC, BMP  Discussed with RN  Adv ambulate in [place]  EPC   IS  Durbin   25G SPA and Lasix 20 mg IV once per Vascular surgery   Pt doing well  OK to DC from FM standpoint   DVT and GI prophylaxis.      Thank you for consulting us!      Chief Complaint:     No chief complaint on file.    Medical management    History of Present Illness:        Patient seen and examined at bedside  Last 24-hour events reviewed with nursing  Patient says he feels better   Sitting up in chair  Pain better controlled  Able to move his limbs better  Diet advanced  D/w SW  Home care arranged     Denies chest pain, dyspnea orthopnea  Denies nausea, vomiting   Denies orthopnea, headache, vision change  Denies paresthesias      Initial HPI  Nato Beckham is a 64 y.o.  male who presents with No chief complaint on  02/17/24  0308   WBC 11.0  --   --  10.9 9.6   RBC 1.90*  --   --  2.28* 2.27*   HGB 6.1*   < > 7.8* 7.3* 7.5*   HCT 19.8*   < > 23.5* 22.4* 22.2*   .2*  --   --  98.2 97.8   MCH 32.1  --   --  32.0 33.0   MCHC 30.8  --   --  32.6 33.8   RDW 14.0  --   --  15.8* 15.0*   PLT 97*  --   --  94* 125*   MPV 10.2  --   --  9.9 9.8    < > = values in this interval not displayed.       Chemistry:  Recent Labs     02/15/24  0335 02/16/24  0409 02/16/24  1604 02/17/24 0308    138  --  141   K 4.0 3.7 3.3* 3.8    100  --  102   CO2 27 26  --  30   GLUCOSE 105* 69*  --  92   BUN 13 8  --  6*   CREATININE 0.6* 0.4*  --  0.5*   MG 2.1 2.0  --  2.1   ANIONGAP 10 12  --  9   LABGLOM >60 >60  --  >60   CALCIUM 7.6* 7.9*  --  8.0*   CAION  --   --  1.13  --    PHOS 2.3* 2.5  --  2.4*       No results for input(s): \"PROT\", \"LABALBU\", \"LABA1C\", \"P7TFGBJ\", \"X0REPCW\", \"FT4\", \"TSH\", \"AST\", \"ALT\", \"LDH\", \"GGT\", \"ALKPHOS\", \"LABGGT\", \"BILITOT\", \"BILIDIR\", \"AMMONIA\", \"AMYLASE\", \"LIPASE\", \"LACTATE\", \"CHOL\", \"HDL\", \"LDLCHOLESTEROL\", \"CHOLHDLRATIO\", \"TRIG\", \"VLDL\", \"PST39MQ\", \"PHENYTOIN\", \"PHENYF\", \"URICACID\", \"POCGLU\" in the last 72 hours.      Lab Results   Component Value Date    INR 1.0 01/15/2024    INR 1.0 09/15/2021    INR 0.9 09/14/2021    PROTIME 13.3 01/15/2024    PROTIME 13.3 09/15/2021    PROTIME 12.4 09/14/2021       No results found for: \"SPECIAL\"  No results found for: \"CULTURE\"    Lab Results   Component Value Date/Time    POCPH 7.249 02/13/2024 03:23 PM    POCPCO2 44.7 02/13/2024 03:23 PM    POCPO2 187.6 02/13/2024 03:23 PM    POCHCO3 19.5 02/13/2024 03:23 PM    NBEA 7.4 02/13/2024 03:23 PM    UXNP9VKE 99.4 02/13/2024 03:23 PM       Radiology:    XR CHEST PORTABLE    Result Date: 2/13/2024  No acute cardiopulmonary process.         All radiological studies reviewed                Code Status:  Full Code    Electronically signed by Bharti Mccartney MD on 2/17/2024 at 2:15 PM     Copy sent to Ricardo Booker

## 2024-06-08 ENCOUNTER — HOSPITAL ENCOUNTER (EMERGENCY)
Age: 64
Discharge: HOME | End: 2024-06-08
Attending: EMERGENCY MEDICINE
Payer: COMMERCIAL

## 2024-06-08 ENCOUNTER — APPOINTMENT (OUTPATIENT)
Dept: GENERAL RADIOLOGY | Age: 64
End: 2024-06-08
Payer: COMMERCIAL

## 2024-06-08 VITALS
SYSTOLIC BLOOD PRESSURE: 123 MMHG | HEART RATE: 87 BPM | TEMPERATURE: 97.2 F | RESPIRATION RATE: 18 BRPM | OXYGEN SATURATION: 99 % | DIASTOLIC BLOOD PRESSURE: 72 MMHG

## 2024-06-08 DIAGNOSIS — S39.012A BACK STRAIN, INITIAL ENCOUNTER: Primary | ICD-10-CM
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
## 2024-06-08 PROCEDURE — 72072 X-RAY EXAM THORAC SPINE 3VWS: CPT

## 2024-06-08 PROCEDURE — 96372 THER/PROPH/DIAG INJ SC/IM: CPT

## 2024-06-08 PROCEDURE — 99284 EMERGENCY DEPT VISIT MOD MDM: CPT

## 2024-06-08 PROCEDURE — 72040 X-RAY EXAM NECK SPINE 2-3 VW: CPT

## 2024-06-08 PROCEDURE — 6360000002 HC RX W HCPCS: Performed by: EMERGENCY MEDICINE

## 2024-06-08 PROCEDURE — 6370000000 HC RX 637 (ALT 250 FOR IP): Performed by: EMERGENCY MEDICINE

## 2024-06-08 RX ORDER — ORPHENADRINE CITRATE 100 MG/1
100 TABLET, EXTENDED RELEASE ORAL ONCE
Status: COMPLETED | OUTPATIENT
Start: 2024-06-08 | End: 2024-06-08

## 2024-06-08 RX ORDER — MORPHINE SULFATE 10 MG/ML
6 INJECTION, SOLUTION INTRAMUSCULAR; INTRAVENOUS ONCE
Status: COMPLETED | OUTPATIENT
Start: 2024-06-08 | End: 2024-06-08

## 2024-06-08 RX ORDER — GABAPENTIN 600 MG/1
600 TABLET ORAL 3 TIMES DAILY
COMMUNITY

## 2024-06-08 RX ADMIN — MORPHINE SULFATE 6 MG: 10 INJECTION INTRAVENOUS at 11:05

## 2024-06-08 RX ADMIN — ORPHENADRINE CITRATE 100 MG: 100 TABLET, EXTENDED RELEASE ORAL at 11:45

## 2024-06-08 ASSESSMENT — PAIN DESCRIPTION - LOCATION
LOCATION: BACK
LOCATION: SHOULDER

## 2024-06-08 ASSESSMENT — PAIN - FUNCTIONAL ASSESSMENT: PAIN_FUNCTIONAL_ASSESSMENT: 0-10

## 2024-06-08 ASSESSMENT — PAIN SCALES - GENERAL
PAINLEVEL_OUTOF10: 10
PAINLEVEL_OUTOF10: 10

## 2024-06-08 ASSESSMENT — PAIN DESCRIPTION - ORIENTATION
ORIENTATION: RIGHT
ORIENTATION: RIGHT

## 2024-06-08 ASSESSMENT — PAIN DESCRIPTION - DESCRIPTORS: DESCRIPTORS: SHARP

## 2024-06-08 NOTE — ED PROVIDER NOTES
N/A    Decision Rules/Scores utilized:  N/A    HEART SCORE: N/A, no chest pain.    NIH STROKE SCALE: N/A, no focal neurodeficits.     External Documents Reviewed: I have independently reviewed patient's previous medical records including labs, notes and imaging.    Tests considered but not ordered and why:  MRI not indicated at this time.    Imaging that is independently reviewed and interpreted by me as: N/A    See more data below for the lab and radiology tests and orders.    3)  Treatment and Disposition    Patient repeat assessment: Patient is stable.  Nontoxic-appearing, no acute distress.  Pain improved with morphine 6 mg IM.  X-ray thoracic spine obtained, demonstrate no acute findings.  Does show mild anterior wedging of T9 and T11 unchanged, likely developmental or related to old compression fractures.  Patient has follow-up with his pain specialist in a week and a half.  Advised to discuss PT and/or MRI if symptoms do not improve.  No further workup indicated this time    Case discussed with consulting clinician:  N/A    Disposition discussion with patient/family: Patient aware and agrees with disposition plan.    MIPS:  N/A    Social determinants of health impacting treatment or disposition:  None    Shared Decision Making completed with patient regarding risks and benefits of admission versus discharge.  Patient decides to be discharged home.    Code Status Discussion:  Not discussed    \"ED Course\" Notes From Epic Narrator:     All questions answered.  Given strict return precautions.  No further work-up indicated at this time.      CRITICAL CARE:   N/A    PROCEDURES:  Procedures      DATA FOR LAB AND RADIOLOGY TESTS ORDERED BELOW ARE REVIEWED BY THE ED CLINICIAN:    RADIOLOGY: All x-rays, CT, MRI, and formal ultrasound images (except ED bedside ultrasound) are read by the radiologist, see reports below, unless otherwise noted in MDM or here.  Reports below are reviewed by myself.  XR THORACIC SPINE (3

## 2024-06-08 NOTE — DISCHARGE INSTRUCTIONS
Return to this emergency room immediately if your symptoms persist, worsen or if new ones form.    Make sure you follow-up with your primary care doctor and your pain specialist at your previously scheduled appointment.

## 2024-11-01 ENCOUNTER — TELEPHONE (OUTPATIENT)
Dept: INTERVENTIONAL RADIOLOGY/VASCULAR | Age: 64
End: 2024-11-01

## 2024-11-01 NOTE — TELEPHONE ENCOUNTER
Spoke to Latisha in Dr. Pisano's office in regard to bx request. She voiced understanding and will follow up with Dr. Pisano. Information was also faxed as requested. Call back number given. See media.

## 2024-11-05 ENCOUNTER — TELEPHONE (OUTPATIENT)
Dept: INTERVENTIONAL RADIOLOGY/VASCULAR | Age: 64
End: 2024-11-05

## 2024-11-05 NOTE — TELEPHONE ENCOUNTER
Update: Twin returned call from Dr. Pisano's office in regard to bx request. Per twin, discontinue bx request. Will not being having bone or lung bx at this time.

## 2025-04-02 ENCOUNTER — HOSPITAL ENCOUNTER (EMERGENCY)
Age: 65
Discharge: HOME | End: 2025-04-02
Attending: EMERGENCY MEDICINE
Payer: COMMERCIAL

## 2025-04-02 ENCOUNTER — APPOINTMENT (OUTPATIENT)
Dept: CT IMAGING | Age: 65
End: 2025-04-02
Payer: COMMERCIAL

## 2025-04-02 VITALS
OXYGEN SATURATION: 92 % | RESPIRATION RATE: 21 BRPM | HEART RATE: 100 BPM | SYSTOLIC BLOOD PRESSURE: 97 MMHG | HEIGHT: 68 IN | TEMPERATURE: 97.7 F | DIASTOLIC BLOOD PRESSURE: 66 MMHG | WEIGHT: 130 LBS | BODY MASS INDEX: 19.7 KG/M2

## 2025-04-02 DIAGNOSIS — J44.1 COPD WITH ACUTE EXACERBATION (HCC): Primary | ICD-10-CM

## 2025-04-02 LAB
ANION GAP SERPL CALCULATED.3IONS-SCNC: 15 MMOL/L (ref 9–16)
BASOPHILS # BLD: 0.05 K/UL (ref 0–0.2)
BASOPHILS NFR BLD: 1 % (ref 0–2)
BNP SERPL-MCNC: 89 PG/ML (ref 0–125)
BUN SERPL-MCNC: 9 MG/DL (ref 8–23)
CALCIUM SERPL-MCNC: 9.2 MG/DL (ref 8.8–10.2)
CHLORIDE SERPL-SCNC: 98 MMOL/L (ref 98–107)
CO2 SERPL-SCNC: 25 MMOL/L (ref 20–31)
CREAT SERPL-MCNC: 0.9 MG/DL (ref 0.7–1.2)
EOSINOPHIL # BLD: 0.17 K/UL (ref 0–0.44)
EOSINOPHILS RELATIVE PERCENT: 2 % (ref 1–4)
ERYTHROCYTE [DISTWIDTH] IN BLOOD BY AUTOMATED COUNT: 13.4 % (ref 11.8–14.4)
FLUAV RNA RESP QL NAA+PROBE: NOT DETECTED
FLUBV RNA RESP QL NAA+PROBE: NOT DETECTED
GFR, ESTIMATED: >90 ML/MIN/1.73M2
GLUCOSE SERPL-MCNC: 118 MG/DL (ref 82–115)
HCT VFR BLD AUTO: 36.1 % (ref 40.7–50.3)
HGB BLD-MCNC: 12 G/DL (ref 13–17)
IMM GRANULOCYTES # BLD AUTO: 0.05 K/UL (ref 0–0.3)
IMM GRANULOCYTES NFR BLD: 1 %
LYMPHOCYTES NFR BLD: 0.71 K/UL (ref 1.1–3.7)
LYMPHOCYTES RELATIVE PERCENT: 9 % (ref 24–43)
MCH RBC QN AUTO: 31.8 PG (ref 25.2–33.5)
MCHC RBC AUTO-ENTMCNC: 33.2 G/DL (ref 28.4–34.8)
MCV RBC AUTO: 95.8 FL (ref 82.6–102.9)
MONOCYTES NFR BLD: 0.92 K/UL (ref 0.1–1.2)
MONOCYTES NFR BLD: 12 % (ref 3–12)
MYOGLOBIN SERPL-MCNC: 33 NG/ML (ref 28–72)
MYOGLOBIN SERPL-MCNC: 42 NG/ML (ref 28–72)
NEUTROPHILS NFR BLD: 75 % (ref 36–65)
NEUTS SEG NFR BLD: 5.8 K/UL (ref 1.5–8.1)
NRBC BLD-RTO: 0 PER 100 WBC
PLATELET # BLD AUTO: 210 K/UL (ref 138–453)
PMV BLD AUTO: 9.3 FL (ref 8.1–13.5)
POTASSIUM SERPL-SCNC: 4.1 MMOL/L (ref 3.7–5.3)
RBC # BLD AUTO: 3.77 M/UL (ref 4.21–5.77)
SARS-COV-2 RNA RESP QL NAA+PROBE: NOT DETECTED
SODIUM SERPL-SCNC: 138 MMOL/L (ref 136–145)
SOURCE: NORMAL
SPECIMEN DESCRIPTION: NORMAL
TROPONIN I SERPL HS-MCNC: 26 NG/L (ref 0–22)
TROPONIN I SERPL HS-MCNC: 26 NG/L (ref 0–22)
WBC OTHER # BLD: 7.7 K/UL (ref 3.5–11.3)

## 2025-04-02 PROCEDURE — 96375 TX/PRO/DX INJ NEW DRUG ADDON: CPT

## 2025-04-02 PROCEDURE — 6360000004 HC RX CONTRAST MEDICATION: Performed by: PHYSICIAN ASSISTANT

## 2025-04-02 PROCEDURE — 83874 ASSAY OF MYOGLOBIN: CPT

## 2025-04-02 PROCEDURE — 71260 CT THORAX DX C+: CPT

## 2025-04-02 PROCEDURE — 2580000003 HC RX 258: Performed by: PHYSICIAN ASSISTANT

## 2025-04-02 PROCEDURE — 83880 ASSAY OF NATRIURETIC PEPTIDE: CPT

## 2025-04-02 PROCEDURE — 2500000003 HC RX 250 WO HCPCS: Performed by: PHYSICIAN ASSISTANT

## 2025-04-02 PROCEDURE — 94640 AIRWAY INHALATION TREATMENT: CPT

## 2025-04-02 PROCEDURE — 6360000002 HC RX W HCPCS: Performed by: PHYSICIAN ASSISTANT

## 2025-04-02 PROCEDURE — 80048 BASIC METABOLIC PNL TOTAL CA: CPT

## 2025-04-02 PROCEDURE — 87636 SARSCOV2 & INF A&B AMP PRB: CPT

## 2025-04-02 PROCEDURE — 36415 COLL VENOUS BLD VENIPUNCTURE: CPT

## 2025-04-02 PROCEDURE — 94760 N-INVAS EAR/PLS OXIMETRY 1: CPT

## 2025-04-02 PROCEDURE — 84484 ASSAY OF TROPONIN QUANT: CPT

## 2025-04-02 PROCEDURE — 85025 COMPLETE CBC W/AUTO DIFF WBC: CPT

## 2025-04-02 PROCEDURE — 99285 EMERGENCY DEPT VISIT HI MDM: CPT

## 2025-04-02 PROCEDURE — 96374 THER/PROPH/DIAG INJ IV PUSH: CPT

## 2025-04-02 RX ORDER — ALBUTEROL SULFATE 0.83 MG/ML
2.5 SOLUTION RESPIRATORY (INHALATION) ONCE
Status: COMPLETED | OUTPATIENT
Start: 2025-04-02 | End: 2025-04-02

## 2025-04-02 RX ORDER — IOPAMIDOL 755 MG/ML
75 INJECTION, SOLUTION INTRAVASCULAR
Status: COMPLETED | OUTPATIENT
Start: 2025-04-02 | End: 2025-04-02

## 2025-04-02 RX ORDER — MORPHINE SULFATE 4 MG/ML
4 INJECTION, SOLUTION INTRAMUSCULAR; INTRAVENOUS ONCE
Refills: 0 | Status: COMPLETED | OUTPATIENT
Start: 2025-04-02 | End: 2025-04-02

## 2025-04-02 RX ORDER — AZITHROMYCIN 250 MG/1
TABLET, FILM COATED ORAL
Qty: 1 PACKET | Refills: 0 | Status: SHIPPED | OUTPATIENT
Start: 2025-04-02 | End: 2025-04-12

## 2025-04-02 RX ORDER — ONDANSETRON 2 MG/ML
4 INJECTION INTRAMUSCULAR; INTRAVENOUS ONCE
Status: COMPLETED | OUTPATIENT
Start: 2025-04-02 | End: 2025-04-02

## 2025-04-02 RX ORDER — PREDNISONE 10 MG/1
TABLET ORAL
Qty: 30 TABLET | Refills: 0 | Status: SHIPPED | OUTPATIENT
Start: 2025-04-02

## 2025-04-02 RX ORDER — BENZONATATE 100 MG/1
100 CAPSULE ORAL 3 TIMES DAILY PRN
Qty: 30 CAPSULE | Refills: 0 | Status: SHIPPED | OUTPATIENT
Start: 2025-04-02 | End: 2025-04-09

## 2025-04-02 RX ORDER — SODIUM CHLORIDE 0.9 % (FLUSH) 0.9 %
10 SYRINGE (ML) INJECTION ONCE
Status: COMPLETED | OUTPATIENT
Start: 2025-04-02 | End: 2025-04-02

## 2025-04-02 RX ORDER — 0.9 % SODIUM CHLORIDE 0.9 %
80 INTRAVENOUS SOLUTION INTRAVENOUS ONCE
Status: COMPLETED | OUTPATIENT
Start: 2025-04-02 | End: 2025-04-02

## 2025-04-02 RX ADMIN — ONDANSETRON 4 MG: 2 INJECTION, SOLUTION INTRAMUSCULAR; INTRAVENOUS at 18:24

## 2025-04-02 RX ADMIN — WATER 125 MG: 1 INJECTION INTRAMUSCULAR; INTRAVENOUS; SUBCUTANEOUS at 18:23

## 2025-04-02 RX ADMIN — SODIUM CHLORIDE 80 ML: 9 INJECTION, SOLUTION INTRAVENOUS at 19:09

## 2025-04-02 RX ADMIN — ALBUTEROL SULFATE 2.5 MG: 2.5 SOLUTION RESPIRATORY (INHALATION) at 18:33

## 2025-04-02 RX ADMIN — MORPHINE SULFATE 4 MG: 4 INJECTION, SOLUTION INTRAMUSCULAR; INTRAVENOUS at 18:23

## 2025-04-02 RX ADMIN — IOPAMIDOL 75 ML: 755 INJECTION, SOLUTION INTRAVENOUS at 19:09

## 2025-04-02 RX ADMIN — SODIUM CHLORIDE, PRESERVATIVE FREE 10 ML: 5 INJECTION INTRAVENOUS at 19:09

## 2025-04-02 ASSESSMENT — PAIN - FUNCTIONAL ASSESSMENT: PAIN_FUNCTIONAL_ASSESSMENT: 0-10

## 2025-04-02 ASSESSMENT — PAIN DESCRIPTION - DESCRIPTORS: DESCRIPTORS: ACHING;DISCOMFORT

## 2025-04-02 ASSESSMENT — PAIN SCALES - GENERAL: PAINLEVEL_OUTOF10: 7

## 2025-04-02 ASSESSMENT — PAIN DESCRIPTION - ORIENTATION: ORIENTATION: RIGHT

## 2025-04-02 ASSESSMENT — PAIN DESCRIPTION - FREQUENCY: FREQUENCY: INTERMITTENT

## 2025-04-02 ASSESSMENT — PAIN DESCRIPTION - PAIN TYPE: TYPE: CHRONIC PAIN

## 2025-04-02 ASSESSMENT — PAIN DESCRIPTION - LOCATION: LOCATION: CHEST

## 2025-04-03 NOTE — ED PROVIDER NOTES
enzymes are negative x 2.  Patient feels better after breathing treatments.  O2 sat 94% on room air.  Patient did not need oxygen here.  O2 sat 86% on room air was only noted once.  And was extremely brief.  There was question if the leads and pulse oximeter actually truly on the patient.  However patient was offered admission to the hospital for COPD exacerbation.  Wife at bedside.  Patient declined.  Risk benefits discussed at length.  Given prednisone Z-Ilya and discharged home.    Evaluation and treatment course in the ED, and plan of care upon discharge was discussed in length with the patient. Patient had no further questions prior to being discharged and was instructed to return to the ED for new or worsening symptoms.          The patient was involved in his/her plan of care. The testing that was ordered was discussed with the patient. Any medications that may have been ordered were discussed with the patient.       I have reviewed the patient's previous medical records using the electronic health record that we have available.      Labs and imaging were reviewed.       CONSULTS:  None    PROCEDURES:  Procedures        FINAL IMPRESSION      1. COPD with acute exacerbation (HCC)          DISPOSITION/PLAN   DISPOSITION Decision To Discharge 04/02/2025 08:10:40 PM   DISPOSITION CONDITION Stable           PATIENTREFERRED TO:   No follow-up provider specified.    DISCHARGE MEDICATIONS:     Discharge Medication List as of 4/2/2025  8:41 PM        START taking these medications    Details   predniSONE (DELTASONE) 10 MG tablet Take 5 tablets daily for 2 days Take 4 tablets daily for 2 days Take 3 tablets daily for 2 days Take 2 tablets daily for 2 days Take 1 tablets daily for 2 days, Disp-30 tablet, R-0Normal      benzonatate (TESSALON PERLES) 100 MG capsule Take 1 capsule by mouth 3 times daily as needed for Cough, Disp-30 capsule, R-0Normal      azithromycin (ZITHROMAX) 250 MG tablet Take 2 tablets (500 mg) on Day

## 2025-04-30 ENCOUNTER — APPOINTMENT (OUTPATIENT)
Dept: GENERAL RADIOLOGY | Age: 65
End: 2025-04-30
Payer: COMMERCIAL

## 2025-04-30 ENCOUNTER — HOSPITAL ENCOUNTER (EMERGENCY)
Age: 65
Discharge: HOME | End: 2025-04-30
Attending: EMERGENCY MEDICINE
Payer: COMMERCIAL

## 2025-04-30 VITALS
OXYGEN SATURATION: 94 % | WEIGHT: 130 LBS | DIASTOLIC BLOOD PRESSURE: 70 MMHG | SYSTOLIC BLOOD PRESSURE: 119 MMHG | HEART RATE: 72 BPM | HEIGHT: 68 IN | RESPIRATION RATE: 13 BRPM | TEMPERATURE: 98.7 F | BODY MASS INDEX: 19.7 KG/M2

## 2025-04-30 DIAGNOSIS — I10 ESSENTIAL HYPERTENSION: Primary | ICD-10-CM

## 2025-04-30 LAB
ANION GAP SERPL CALCULATED.3IONS-SCNC: 14 MMOL/L (ref 9–16)
BUN SERPL-MCNC: 10 MG/DL (ref 8–23)
CALCIUM SERPL-MCNC: 9.4 MG/DL (ref 8.8–10.2)
CHLORIDE SERPL-SCNC: 95 MMOL/L (ref 98–107)
CO2 SERPL-SCNC: 26 MMOL/L (ref 20–31)
CREAT SERPL-MCNC: 1.1 MG/DL (ref 0.7–1.2)
GFR, ESTIMATED: 76 ML/MIN/1.73M2
GLUCOSE SERPL-MCNC: 99 MG/DL (ref 82–115)
POTASSIUM SERPL-SCNC: 4 MMOL/L (ref 3.7–5.3)
SODIUM SERPL-SCNC: 135 MMOL/L (ref 136–145)
TROPONIN I SERPL HS-MCNC: 53 NG/L (ref 0–22)
TROPONIN I SERPL HS-MCNC: 56 NG/L (ref 0–22)

## 2025-04-30 PROCEDURE — 84484 ASSAY OF TROPONIN QUANT: CPT

## 2025-04-30 PROCEDURE — 71101 X-RAY EXAM UNILAT RIBS/CHEST: CPT

## 2025-04-30 PROCEDURE — 93005 ELECTROCARDIOGRAM TRACING: CPT | Performed by: EMERGENCY MEDICINE

## 2025-04-30 PROCEDURE — 99285 EMERGENCY DEPT VISIT HI MDM: CPT

## 2025-04-30 PROCEDURE — 80048 BASIC METABOLIC PNL TOTAL CA: CPT

## 2025-04-30 ASSESSMENT — PAIN SCALES - GENERAL: PAINLEVEL_OUTOF10: 8

## 2025-04-30 ASSESSMENT — PAIN - FUNCTIONAL ASSESSMENT: PAIN_FUNCTIONAL_ASSESSMENT: 0-10

## 2025-04-30 NOTE — ED PROVIDER NOTES
EMERGENCY DEPARTMENT ENCOUNTER    Pt Name: Nato Beckham  MRN: [id number]  Birthdate 1960  Date of evaluation: 4/30/25  CHIEF COMPLAINT       Chief Complaint   Patient presents with    Vomiting     HISTORY OF PRESENT ILLNESS   The history is provided by the patient and medical records.  The patient is a 65-year-old male with history of lung CA, hypertension, hyperlipidemia, COPD, degenerative disc disease lumbar spine, tobacco dependency who presents to the ED for low blood pressure and vomiting.  Patient states he was at oncology clinic this morning for follow-up appointment. They difficulty obtaining a blood pressure, he was lightheaded, and they sent him to the emergency room for further evaluation. At triage, he was hypertensive, 215/201. Other than chronic right shoulder pain he has no other acute symptoms.    REVIEW OF SYSTEMS     Review of Systems  All other systems reviewed and are negative.    PASTMEDICAL HISTORY     Past Medical History:   Diagnosis Date    Arthralgia     Bilateral iliac artery occlusion (HCC)     Bruises easily     takes blood thinners    Cervical myelopathy (HCC)     COPD (chronic obstructive pulmonary disease) (HCC)     Dr Pratt for Pulmonology    DDD (degenerative disc disease), lumbar     Hyperlipidemia     Hypertension     Leg pain, bilateral     left leg is worse per patient    Lumbar disc herniation     Retrolisthesis     Smoker     Spondylolisthesis of lumbar region     Weight loss      Past Problem List  Patient Active Problem List   Diagnosis Code    Cervical spondylosis with myelopathy M47.12    Postprocedural pneumothorax J95.811    Pneumothorax J93.9    Aortoiliac occlusive disease (HCC) I74.09     SURGICAL HISTORY       Past Surgical History:   Procedure Laterality Date    AORTO-FEMORAL BYPASS GRAFT Bilateral 2/13/2024    AORTO BIFEM BYPASS - CELLSAVER performed by Ras Ramires MD at [facility] OR    BACK SURGERY  06/01/2018    lumbar (2) disc removed and axel placed

## 2025-04-30 NOTE — ED TRIAGE NOTES
Pt reports he was at his oncologist who told him his BP was too low, reports they gave him a water and a darinel cup, but states he started to vomit so they sent him to ER.

## 2025-04-30 NOTE — ED NOTES
Pt to the ED due to vomiting. Pt states he was at his oncologist when they told him his blood pressure was too low. Pt was given water and something to eat at that time. The pt then began vomiting while in the office. His provider instructed him to come to the ED for further evaluation. Pt is Aox4, breathing equal and non-labored. Pt is independent and had a steady gait to ED room 25. Pt denies any further needs at this time.

## 2025-05-01 LAB
EKG ATRIAL RATE: 89 BPM
EKG P AXIS: 82 DEGREES
EKG P-R INTERVAL: 126 MS
EKG Q-T INTERVAL: 338 MS
EKG QRS DURATION: 72 MS
EKG QTC CALCULATION (BAZETT): 411 MS
EKG R AXIS: -34 DEGREES
EKG T AXIS: 60 DEGREES
EKG VENTRICULAR RATE: 89 BPM

## (undated) DEVICE — Device

## (undated) DEVICE — BLOOD TRANSFUSION FILTER: Brand: HAEMONETICS

## (undated) DEVICE — SOLUTION IV 1000ML 0.9% SOD CHL PH 5 INJ USP VIAFLX PLAS

## (undated) DEVICE — CLIP LIG SM TI 6 BLU HNDL FOR OPN AND ENDOSCP SGL APPL

## (undated) DEVICE — GEL US 20GM NONIRRITATING OVERWRAPPED FILE PCH TRNSMIT

## (undated) DEVICE — GAUZE, BORDER, 3"X6", 1.5"X4"PAD, STERIL: Brand: MEDLINE INDUSTRIES, INC.

## (undated) DEVICE — PROTECTOR EYE PT SELF ADH NS OPT GRD LF

## (undated) DEVICE — SUTURE PROL SZ 3-0 L36IN NONABSORBABLE BLU L26MM SH 1/2 CIR 8522H

## (undated) DEVICE — CATHETER,URETHRAL,REDRUBBER,STRL,10FR: Brand: MEDLINE INDUSTRIES, INC.

## (undated) DEVICE — CATHETER FOL 2 W 16 FR URETH INDWL REG DOVER

## (undated) DEVICE — PREP-RESISTANT MARKER W/ RULER: Brand: MEDLINE INDUSTRIES, INC.

## (undated) DEVICE — SUTURE VCRL + SZ 2-0 L36IN ABSRB UD L36MM CT-1 1/2 CIR VCP945H

## (undated) DEVICE — SUTURE VCRL + SZ 3-0 L27IN ABSRB UD L26MM SH 1/2 CIR VCP416H

## (undated) DEVICE — 4-PORT MANIFOLD: Brand: NEPTUNE 2

## (undated) DEVICE — MASTISOL ADHESIVE LIQ 2/3ML

## (undated) DEVICE — ADHESIVE SKIN CLSR 0.7ML TOP DERMBND ADV

## (undated) DEVICE — CONTAINER,SPECIMEN,OR STERILE,4OZ: Brand: MEDLINE

## (undated) DEVICE — SUTURE ETHBND EXCEL SZ 3-0 L30IN NONABSORBABLE GRN L26MM SH X832H

## (undated) DEVICE — LOOP VES W13MM THK09MM MINI RED SIL FLD REPELLENT

## (undated) DEVICE — SUTURE VCRL SZ 3-0 L27IN ABSRB UD L26MM SH 1/2 CIR J416H

## (undated) DEVICE — SOLUTION IV 500ML 0.9% SOD CHL PH 5 INJ USP VIAFLX PLAS

## (undated) DEVICE — BAG ISOLATN W20XL20IN PLAS CUF OPN STR CLR FLM W SEAMLESS

## (undated) DEVICE — ULTRACLEAN ACCESSORY ELECTRODE, 1 INCH COATED NEEDLE WITH EXTENDED INSULATION: Brand: ULTRACLEAN

## (undated) DEVICE — PRESSURE MONITORING SET: Brand: TRUWAVE

## (undated) DEVICE — FOGARTY - HYDRAGRIP SURGICAL - CLAMP INSERTS: Brand: FOGARTY HYDRAJAW

## (undated) DEVICE — SYRINGE, LUER LOCK, 10ML: Brand: MEDLINE

## (undated) DEVICE — GAUZE,SPONGE,FLUFF,6"X6.75",STRL,5/TRAY: Brand: MEDLINE

## (undated) DEVICE — SAFETY BLOOD COLLECTION SET + LUER ADAPTER 21G X 3/4" TUBING LENGTH 12" (30 CM), SINGLE-PACKED, STERILE, NOT MADE WITH NATURAL RUBBER LATEX: Brand: VACUETTE

## (undated) DEVICE — GLOVE SURG SZ 8 L11.77IN FNGR THK9.8MIL STRW LTX POLYMER

## (undated) DEVICE — CODMAN® SURGICAL PATTIES 1" X 1" (2.54CM X 2.54CM): Brand: CODMAN®

## (undated) DEVICE — SPONGE LAP W18XL18IN WHT COT 4 PLY FLD STRUNG RADPQ DISP ST 2 PER PACK

## (undated) DEVICE — SUTURE MCRYL SZ 4-0 L27IN ABSRB UD L19MM PS-2 1/2 CIR PRIM Y426H

## (undated) DEVICE — SHEET, T, LAPAROTOMY, STERILE: Brand: MEDLINE

## (undated) DEVICE — CATHETER,URETHRAL,REDRUBBER,STRL,16FR: Brand: MEDLINE

## (undated) DEVICE — SYRINGE MEDICAL 3ML CLEAR PLASTIC STANDARD NON CONTROL LUERLOCK TIP DISPOSABLE

## (undated) DEVICE — BLANKET WRM W40.2XL55.9IN IORT LO BODY + MISTRAL AIR

## (undated) DEVICE — YANKAUER,FLEXIBLE HANDLE,REGLR CAPACITY: Brand: MEDLINE INDUSTRIES, INC.

## (undated) DEVICE — SET ADMIN 25ML L117IN PMP MOD CK VLV RLER CLMP 2 SMRTSITE

## (undated) DEVICE — CORD,CAUTERY,BIPOLAR,STERILE: Brand: MEDLINE

## (undated) DEVICE — STRIP,CLOSURE,WOUND,MEDI-STRIP,1/2X4: Brand: MEDLINE

## (undated) DEVICE — TAPE UMBILICAL W1/16XL30IN COTTON ROUND NONRADIOPAQUE

## (undated) DEVICE — GLOVE SURG SZ 85 CRM LTX FREE POLYISOPRENE POLYMER BEAD ANTI

## (undated) DEVICE — PLEDGET SURG W3.5XL7MM THK1.5MM WHT PTFE RECT SFT TFE

## (undated) DEVICE — SUTURE PROL SZ 5-0 L30IN NONABSORBABLE BLU L13MM RB-2 1/2 8710H

## (undated) DEVICE — SUTURE NONABSORBABLE MONOFILAMENT 6-0 BV-1 1X30 IN PROLENE 8709H

## (undated) DEVICE — SOLUTION PREP PEROXIDE HYDROGEN 3% 4 OZ 1

## (undated) DEVICE — 4.0MM PRECISION ROUND

## (undated) DEVICE — 1010 S-DRAPE TOWEL DRAPE 10/BX: Brand: STERI-DRAPE™

## (undated) DEVICE — SUTURE PDS II SZ 1 L36IN ABSRB VLT L48MM CTX 1/2 CIR Z371T

## (undated) DEVICE — PREMIUM WET SKIN PREP TRAY: Brand: MEDLINE INDUSTRIES, INC.

## (undated) DEVICE — DRESSING BORDERED ADH GZ UNIV GEN USE 8INX4IN AND 6INX2IN

## (undated) DEVICE — TOTAL TRAY, DB, 100% SILI FOLEY, 16FR 10: Brand: MEDLINE

## (undated) DEVICE — TUBING, SUCTION, 1/4" X 12', STRAIGHT: Brand: MEDLINE

## (undated) DEVICE — SUTURE NONABSORBABLE MONOFILAMENT 4-0 RB-1 36 IN BLU PROLENE 8557H

## (undated) DEVICE — SUTURE MCRYL SZ 3-0 L27IN ABSRB UD PS-2 3/8 CIR REV CUT NDL MCP427H

## (undated) DEVICE — APPLICATOR MEDICATED 10.5 CC SOLUTION HI LT ORNG CHLORAPREP

## (undated) DEVICE — DRESSING TRNSPAR W2XL2.75IN FLM SHT SEMIPERMEABLE WIND

## (undated) DEVICE — CLIP LIG M TI 6 SIL HNDL FOR OPN AND ENDOSCP SGL APPL

## (undated) DEVICE — C-ARM: Brand: UNBRANDED

## (undated) DEVICE — DRAIN SURG FLAT W7MMXL20CM FULL PERF

## (undated) DEVICE — SUTURE VCRL SZ 3-0 L36IN ABSRB UD L36MM CT-1 1/2 CIR J944H

## (undated) DEVICE — METER,URINE,400ML,DRAIN BAG,L/F,LL: Brand: MEDLINE

## (undated) DEVICE — BLADE ES L6IN ELASTOMERIC COAT EXT DURABLE BEND UPTO 90DEG

## (undated) DEVICE — LARGE VISCERA RETAINER: Brand: VISCERA RETAINER, FISH

## (undated) DEVICE — SUTURE PDS II SZ 0 L60IN ABSRB VLT L65MM TP-1 1/2 CIR Z991G

## (undated) DEVICE — DRILL BIT 7080510 11 MM DRILL BIT S

## (undated) DEVICE — ABS MED DISTRACTION PIN, 14MM PATIENT (INNER): Brand: ABS MED DISTRACTION PIN

## (undated) DEVICE — SUTURE VCRL SZ 3-0 L54IN ABSRB UD LIGAPAK REEL POLYGLACTIN J285G

## (undated) DEVICE — SET CATH 20GA L1.75IN RAD ART POLYUR RADPQ W/ INTEGR

## (undated) DEVICE — AGENT HEMOSTATIC SURG ORIGINAL ABS 2X14IN LOOSE KNIT 12/BX

## (undated) DEVICE — ADAPTER,CATHETER/SYRINGE/LUER,STERILE: Brand: MEDLINE

## (undated) DEVICE — DECANTER BAG 9": Brand: MEDLINE INDUSTRIES, INC.

## (undated) DEVICE — TOWEL,OR,DSP,ST,BLUE,DLX,XR,4/PK,20PK/CS: Brand: MEDLINE

## (undated) DEVICE — BLADE ES ELASTOMERIC COAT INSUL DURABLE BEND UPTO 90DEG

## (undated) DEVICE — RESERVOIR,SUCTION,100CC,SILICONE: Brand: MEDLINE

## (undated) DEVICE — LOOP VES W25MM THK1MM MAXI RED SIL FLD REPELLENT 100 PER

## (undated) DEVICE — NEPTUNE E-SEP 165MM SUCTION SLEEVE: Brand: NEPTUNE E-SEP

## (undated) DEVICE — HYPODERMIC SAFETY NEEDLE: Brand: MAGELLAN

## (undated) DEVICE — 3M™ IOBAN™ 2 ANTIMICROBIAL INCISE DRAPE 6650EZ: Brand: IOBAN™ 2

## (undated) DEVICE — SPONGE,PEANUT,XRAY,ST,SM,3/8",5/CARD: Brand: MEDLINE INDUSTRIES, INC.

## (undated) DEVICE — LIQUIBAND RAPID ADHESIVE 36/CS 0.8ML: Brand: MEDLINE